# Patient Record
Sex: MALE | Race: WHITE | NOT HISPANIC OR LATINO | Employment: OTHER | URBAN - METROPOLITAN AREA
[De-identification: names, ages, dates, MRNs, and addresses within clinical notes are randomized per-mention and may not be internally consistent; named-entity substitution may affect disease eponyms.]

---

## 2020-09-17 ENCOUNTER — TELEPHONE (OUTPATIENT)
Dept: SCHEDULING | Facility: IMAGING CENTER | Age: 65
End: 2020-09-17

## 2020-10-06 ENCOUNTER — TELEPHONE (OUTPATIENT)
Dept: CARDIOLOGY | Facility: MEDICAL CENTER | Age: 65
End: 2020-10-06

## 2020-10-06 NOTE — TELEPHONE ENCOUNTER
Called and left vm today 10/06/2020 to remind patient about up coming appointment with Dr. Roberts and also want to see information about medical records for Cardio for appointment.

## 2020-10-09 ENCOUNTER — TELEPHONE (OUTPATIENT)
Dept: CARDIOLOGY | Facility: MEDICAL CENTER | Age: 65
End: 2020-10-09

## 2020-10-09 NOTE — TELEPHONE ENCOUNTER
Called again today and left vm 10/09/2020 to remind patient about up coming appointment with Dr. Roberts and also want to see information about medical records for Cardio for appointment.

## 2020-10-14 ENCOUNTER — OFFICE VISIT (OUTPATIENT)
Dept: MEDICAL GROUP | Facility: MEDICAL CENTER | Age: 65
End: 2020-10-14
Payer: COMMERCIAL

## 2020-10-14 VITALS
WEIGHT: 187 LBS | OXYGEN SATURATION: 99 % | HEIGHT: 74 IN | BODY MASS INDEX: 24 KG/M2 | SYSTOLIC BLOOD PRESSURE: 118 MMHG | TEMPERATURE: 98 F | DIASTOLIC BLOOD PRESSURE: 76 MMHG | HEART RATE: 62 BPM

## 2020-10-14 DIAGNOSIS — Q23.1 BICUSPID AORTIC VALVE: ICD-10-CM

## 2020-10-14 DIAGNOSIS — Z23 NEED FOR VACCINATION: ICD-10-CM

## 2020-10-14 DIAGNOSIS — I10 ESSENTIAL HYPERTENSION: ICD-10-CM

## 2020-10-14 DIAGNOSIS — R42 DIZZINESS: ICD-10-CM

## 2020-10-14 DIAGNOSIS — Z91.89 ENCOUNTER FOR HEPATITIS C VIRUS SCREENING TEST FOR HIGH RISK PATIENT: ICD-10-CM

## 2020-10-14 DIAGNOSIS — E78.00 PURE HYPERCHOLESTEROLEMIA: ICD-10-CM

## 2020-10-14 DIAGNOSIS — Z00.00 ANNUAL PHYSICAL EXAM: ICD-10-CM

## 2020-10-14 DIAGNOSIS — Z11.59 ENCOUNTER FOR HEPATITIS C VIRUS SCREENING TEST FOR HIGH RISK PATIENT: ICD-10-CM

## 2020-10-14 PROBLEM — Q23.81 BICUSPID AORTIC VALVE: Status: ACTIVE | Noted: 2020-10-14

## 2020-10-14 PROCEDURE — 99204 OFFICE O/P NEW MOD 45 MIN: CPT | Mod: 25 | Performed by: NURSE PRACTITIONER

## 2020-10-14 PROCEDURE — 90686 IIV4 VACC NO PRSV 0.5 ML IM: CPT | Performed by: NURSE PRACTITIONER

## 2020-10-14 PROCEDURE — 90471 IMMUNIZATION ADMIN: CPT | Performed by: NURSE PRACTITIONER

## 2020-10-14 RX ORDER — LISINOPRIL AND HYDROCHLOROTHIAZIDE 20; 12.5 MG/1; MG/1
TABLET ORAL
COMMUNITY
Start: 2020-08-19 | End: 2021-01-29

## 2020-10-14 RX ORDER — ATORVASTATIN CALCIUM 10 MG/1
10 TABLET, FILM COATED ORAL DAILY
COMMUNITY
End: 2020-10-23

## 2020-10-14 SDOH — HEALTH STABILITY: MENTAL HEALTH: HOW MANY STANDARD DRINKS CONTAINING ALCOHOL DO YOU HAVE ON A TYPICAL DAY?: 1 OR 2

## 2020-10-14 SDOH — HEALTH STABILITY: MENTAL HEALTH: HOW OFTEN DO YOU HAVE A DRINK CONTAINING ALCOHOL?: 4 OR MORE TIMES A WEEK

## 2020-10-14 SDOH — HEALTH STABILITY: MENTAL HEALTH: HOW OFTEN DO YOU HAVE 6 OR MORE DRINKS ON ONE OCCASION?: NEVER

## 2020-10-14 ASSESSMENT — PATIENT HEALTH QUESTIONNAIRE - PHQ9: CLINICAL INTERPRETATION OF PHQ2 SCORE: 0

## 2020-10-14 NOTE — ASSESSMENT & PLAN NOTE
Chronic. Established with Cardiology in [place] and [place]. Last Echo was April 2018, thinking it was diagnosed as moderate aortic insufficiency, no need for intervention.     Denies SOB, SOB with exertion, dizziness, chest pain.     Establishing with Dr. Roberts Cardiology next week.

## 2020-10-14 NOTE — ASSESSMENT & PLAN NOTE
Chronic. New problem to me today. Stable on atorvastatin 10 mg daily. Does get some muscle stiffness in the morning with this. He previously stopped this because of the stiffness and the symptoms went away. He has not been on any other statin in the past.

## 2020-10-14 NOTE — ASSESSMENT & PLAN NOTE
Chronic. New problem to me today. Checking BP at home, ranging 100s-120s/70s. Taking lisinopril-hctz 20-12.5 mg daily.     Denies chest pain, headaches.     Does get some mild dizziness in the morning while lying prone.

## 2020-10-14 NOTE — LETTER
[facility]  ADDY George.  [address]  [place] [address]  Fax: [phone]   Authorization for Release/Disclosure of   Protected Health Information   Name: FIFI WILKINSON : 1955 SSN: [id number]   Address: [address] [place] [address] [place] [address] Phone:    [phone] (home)    I authorize the entity listed below to release/disclose the PHI below to:   [facility]/KHALIF George and KHALIF George   Provider or Entity Name:  Dr. Prabhjot Moreira MD- [facility]   Address   [facility]  [address] [place] [address] Phone: [phone]    Fax: [phone] / [phone]   Reason for request: continuity of care   Information to be released:    [  ] LAST COLONOSCOPY,  including any PATH REPORT and follow-up  [  ] LAST FIT/COLOGUARD RESULT [  ] LAST DEXA  [  ] LAST MAMMOGRAM  [  ] LAST PAP  [  ] LAST LABS [  ] RETINA EXAM REPORT  [  ] IMMUNIZATION RECORDS  [ XXX ] Release all info      [  ] Check here and initial the line next to each item to release ALL health information INCLUDING  _____ Care and treatment for drug and / or alcohol abuse  _____ HIV testing, infection status, or AIDS  _____ Genetic Testing    DATES OF SERVICE OR TIME PERIOD TO BE DISCLOSED: _____________  I understand and acknowledge that:  * This Authorization may be revoked at any time by you in writing, except if your health information has already been used or disclosed.  * Your health information that will be used or disclosed as a result of you signing this authorization could be re-disclosed by the recipient. If this occurs, your re-disclosed health information may no longer be protected by State or Federal laws.  * You may refuse to sign this Authorization. Your refusal will not affect your ability to obtain treatment.  * This Authorization becomes effective upon signing and will  on (date) __________.      If no date is indicated, this Authorization will   one (1) year from the signature date.    Name: Lionel Zeenat    Signature:   Date:     10/14/2020       PLEASE FAX REQUESTED RECORDS BACK TO: [phone]

## 2020-10-14 NOTE — ASSESSMENT & PLAN NOTE
Patient reports intermittent dizziness that has occurred since childhood. He notes that when he is laying supine and tilts his head back he experiences vertigo like symptoms. Symptoms last a few seconds then resolves.

## 2020-10-15 NOTE — PROGRESS NOTES
Subjective:   Lionel Epperson is a 65 y.o. male here today to establish care and discuss the following:    Pure hypercholesterolemia  Chronic. New problem to me today. Stable on atorvastatin 10 mg daily. Does get some muscle stiffness in the morning with this. He previously stopped this because of the stiffness and the symptoms went away. He has not been on any other statin in the past.     Essential hypertension  Chronic. New problem to me today. Checking BP at home, ranging 100s-120s/70s. Taking lisinopril-hctz 20-12.5 mg daily.     Denies chest pain, headaches.     Does get some mild dizziness in the morning while lying prone.     Bicuspid aortic valve  Chronic. Established with Cardiology in [place] and [place]. Last Echo was April 2018, thinking it was diagnosed as moderate aortic insufficiency, no need for intervention.     Denies SOB, SOB with exertion, dizziness, chest pain.     Establishing with Dr. Roberts Cardiology next week.     Healthcare maintenance  Colonoscopy: Last 2019, recall 5-10 years? Will look at report.    Dizziness  Patient reports intermittent dizziness that has occurred since childhood. He notes that when he is laying supine and tilts his head back he experiences vertigo like symptoms. Symptoms last a few seconds then resolves.        Current medicines (including changes today)  Current Outpatient Medications   Medication Sig Dispense Refill   • lisinopril-hydrochlorothiazide (PRINZIDE) 20-12.5 MG per tablet TAKE 1 TAB BY MOUTH ONCE PER DAY. NEEDS APPT FOR FURTHER REFILLS.     • atorvastatin (LIPITOR) 10 MG Tab Take 10 mg by mouth every day.     • ASPIRIN 81 PO Take  by mouth.     • Saw Palmetto-Pumpkin Seed-Zinc 160-40-7.5 MG Cap Take  by mouth.     • Cholecalciferol (VITAMIN D3 PO) Take  by mouth.     • Coenzyme Q10 (COQ-10) 100 MG Cap Take  by mouth.       No current facility-administered medications for this visit.      He  has a past medical history of Bicuspid aortic valve  "(10/14/2020), Essential hypertension (10/14/2020), and Pure hypercholesterolemia (10/14/2020).    ROS   No chest pain, no shortness of breath, no abdominal pain  Positive ROS as per HPI.  All other systems reviewed and are negative.     Objective:     /76 (BP Location: Right arm, Patient Position: Sitting, BP Cuff Size: Adult)   Pulse 62   Temp 36.7 °C (98 °F) (Temporal)   Ht 1.867 m (6' 1.5\")   Wt 84.8 kg (187 lb)   SpO2 99%  Body mass index is 24.34 kg/m².     Physical Exam:  Constitutional: Alert, no distress.  Skin: Warm, dry, good turgor, no rashes in visible areas.  Eye: Equal, round and reactive, conjunctiva clear, lids normal.  ENMT: Lips without lesions, good dentition, oropharynx clear. TMs WNL  Neck: Trachea midline, no masses, no thyromegaly. No cervical or supraclavicular lymphadenopathy   Respiratory: Unlabored respiratory effort, lungs clear to auscultation, no wheezes, no ronchi.  Cardiovascular: Normal S1, S2, + 3/5 murmur, no edema  Psych: Alert and oriented x3, normal affect and mood.      Assessment and Plan:   The following treatment plan was discussed    1. Bicuspid aortic valve  Stable  Asymptomatic  Follow up with cardiology for continued monitoring    2. Essential hypertension  Stable  Continue lisinopril-hctz 20-12.5 mg daily  Check labs  - CBC WITH DIFFERENTIAL; Future  - Comp Metabolic Panel; Future  - MICROALBUMIN CREAT RATIO URINE; Future  - TSH WITH REFLEX TO FT4; Future    3. Pure hypercholesterolemia  Stable  Continue atorvastatin 10 mg daily  Discussed switching statin to other agent due to morning muscle stiffness, he will discuss with cardiology next week  Due for labs  - Lipid Profile; Future    4. Dizziness  Unstable  Possible inner ear issue, however it has been ongoing for quite some time. Unsure if there are treatment options.   Advised avoiding aggravating positions  Follow up with ENT if he would like to discuss options further  Also advised that he discuss with " cardiology as possibly symptom of his aortic insufficiency  - REFERRAL TO ENT    5. Annual physical exam  - CBC WITH DIFFERENTIAL; Future  - Comp Metabolic Panel; Future  - MICROALBUMIN CREAT RATIO URINE; Future  - Lipid Profile; Future  - TSH WITH REFLEX TO FT4; Future  - VITAMIN D,25 HYDROXY; Future  - PSA TOTAL + %FREE; Future    6. Encounter for hepatitis C screening test for high risk patient  - HEP C VIRUS ANTIBODY; Future    7. Need for vaccination  - Influenza Vaccine Quad Injection (PF)      Followup: Return in about 1 year (around 10/14/2021).    I have placed the below orders and discussed them with an approved delegating provider. The MA is performing the below orders under the direction of Dr. Packer

## 2020-10-23 ENCOUNTER — OFFICE VISIT (OUTPATIENT)
Dept: CARDIOLOGY | Facility: MEDICAL CENTER | Age: 65
End: 2020-10-23
Payer: COMMERCIAL

## 2020-10-23 VITALS
OXYGEN SATURATION: 98 % | WEIGHT: 186.6 LBS | DIASTOLIC BLOOD PRESSURE: 76 MMHG | HEIGHT: 74 IN | SYSTOLIC BLOOD PRESSURE: 124 MMHG | BODY MASS INDEX: 23.95 KG/M2 | HEART RATE: 62 BPM

## 2020-10-23 DIAGNOSIS — I10 ESSENTIAL HYPERTENSION: ICD-10-CM

## 2020-10-23 DIAGNOSIS — Q23.1 BICUSPID AORTIC VALVE: ICD-10-CM

## 2020-10-23 DIAGNOSIS — I35.0 SEVERE AORTIC STENOSIS: Chronic | ICD-10-CM

## 2020-10-23 DIAGNOSIS — E78.00 PURE HYPERCHOLESTEROLEMIA: ICD-10-CM

## 2020-10-23 LAB — EKG IMPRESSION: NORMAL

## 2020-10-23 PROCEDURE — 99204 OFFICE O/P NEW MOD 45 MIN: CPT | Performed by: INTERNAL MEDICINE

## 2020-10-23 PROCEDURE — 93000 ELECTROCARDIOGRAM COMPLETE: CPT | Performed by: INTERNAL MEDICINE

## 2020-10-23 RX ORDER — ROSUVASTATIN CALCIUM 10 MG/1
10 TABLET, COATED ORAL EVERY EVENING
Qty: 90 TAB | Refills: 3 | Status: SHIPPED | OUTPATIENT
Start: 2020-10-23 | End: 2021-09-20

## 2020-10-23 ASSESSMENT — ENCOUNTER SYMPTOMS
ABDOMINAL PAIN: 0
WEAKNESS: 0
BLURRED VISION: 0
NAUSEA: 0
SHORTNESS OF BREATH: 0
PND: 0
DIZZINESS: 0
SORE THROAT: 0
PALPITATIONS: 0
FEVER: 0
FALLS: 0
CHILLS: 0
FOCAL WEAKNESS: 0
CLAUDICATION: 0
COUGH: 0
BRUISES/BLEEDS EASILY: 0

## 2020-10-23 NOTE — PROGRESS NOTES
Chief Complaint   Patient presents with   • New Patient     Bicuspid Aortic Valve       Subjective:   Lionel Epperson is a 65 y.o. male who presents today to establish care with a history of bicuspid aortic valve with severe stenosis based on echocardiogram in 2019 he followed with [facility] cardiology in [facility] for a long time he worked an  no way and moved here for [facility] he is very active with outdoor activities from hikes and has made to healthy weight and activity level apparently no family history of bicuspid valve although both parents did have coronary disease later in life he has been on long-term statin therapy but was having some stiffness in the mornings he was attributing to atorvastatin he has been on co-Q10    Past Medical History:   Diagnosis Date   • Bicuspid aortic valve 10/14/2020   • Essential hypertension 10/14/2020   • Pure hypercholesterolemia 10/14/2020   • Severe aortic stenosis      History reviewed. No pertinent surgical history.  He has no surgeries  Family History   Problem Relation Age of Onset   • Heart Disease Mother    • Heart Attack Mother 88   • Heart Disease Father 65        CABG     Social History     Socioeconomic History   • Marital status:      Spouse name: Not on file   • Number of children: Not on file   • Years of education: Not on file   • Highest education level: Not on file   Occupational History   • Not on file   Social Needs   • Financial resource strain: Not on file   • Food insecurity     Worry: Not on file     Inability: Not on file   • Transportation needs     Medical: Not on file     Non-medical: Not on file   Tobacco Use   • Smoking status: Never Smoker   • Smokeless tobacco: Never Used   Substance and Sexual Activity   • Alcohol use: Yes     Frequency: 4 or more times a week     Drinks per session: 1 or 2     Binge frequency: Never   • Drug use: Not Currently   • Sexual activity: Yes   Lifestyle   • Physical activity     Days per  week: Not on file     Minutes per session: Not on file   • Stress: Not on file   Relationships   • Social connections     Talks on phone: Not on file     Gets together: Not on file     Attends Gnosticism service: Not on file     Active member of club or organization: Not on file     Attends meetings of clubs or organizations: Not on file     Relationship status: Not on file   • Intimate partner violence     Fear of current or ex partner: Not on file     Emotionally abused: Not on file     Physically abused: Not on file     Forced sexual activity: Not on file   Other Topics Concern   • Not on file   Social History Narrative   • Not on file     No Known Allergies  Outpatient Encounter Medications as of 10/23/2020   Medication Sig Dispense Refill   • Coenzyme Q10 300 MG Cap Take 1 Cap by mouth every day. 30 Cap    • rosuvastatin (CRESTOR) 10 MG Tab Take 1 Tab by mouth every evening. 90 Tab 3   • lisinopril-hydrochlorothiazide (PRINZIDE) 20-12.5 MG per tablet TAKE 1 TAB BY MOUTH ONCE PER DAY. NEEDS APPT FOR FURTHER REFILLS.     • ASPIRIN 81 PO Take  by mouth.     • Saw Palmetto-Pumpkin Seed-Zinc 160-40-7.5 MG Cap Take  by mouth.     • Cholecalciferol (VITAMIN D3 PO) Take  by mouth.     • [DISCONTINUED] atorvastatin (LIPITOR) 10 MG Tab Take 10 mg by mouth every day.     • [DISCONTINUED] Coenzyme Q10 (COQ-10) 100 MG Cap Take  by mouth.       No facility-administered encounter medications on file as of 10/23/2020.      Review of Systems   Constitutional: Negative for chills and fever.   HENT: Negative for sore throat.    Eyes: Negative for blurred vision.   Respiratory: Negative for cough and shortness of breath.    Cardiovascular: Negative for chest pain, palpitations, claudication, leg swelling and PND.   Gastrointestinal: Negative for abdominal pain and nausea.   Musculoskeletal: Negative for falls and joint pain.   Skin: Negative for rash.   Neurological: Negative for dizziness, focal weakness and weakness.  "  Endo/Heme/Allergies: Does not bruise/bleed easily.        Objective:   /76 (BP Location: Left arm, Patient Position: Sitting, BP Cuff Size: Adult)   Pulse 62   Ht 1.867 m (6' 1.5\")   Wt 84.6 kg (186 lb 9.6 oz)   SpO2 98%   BMI 24.29 kg/m²     Physical Exam   Constitutional: No distress.   HENT:   Patient wearing a mask due to COVID precautions   Eyes: No scleral icterus.   Neck: No JVD present.   Cardiovascular: Normal rate. Exam reveals no gallop and no friction rub.   Murmur heard.  Pulmonary/Chest: No respiratory distress. He has no wheezes. He has no rales.   Abdominal: Soft. Bowel sounds are normal.   Musculoskeletal:         General: No edema.   Neurological: He is alert.   Skin: No rash noted. He is not diaphoretic.   Psychiatric: He has a normal mood and affect.     We reviewed in person the most recent labs  Recent Results (from the past 4032 hour(s))   EKG    Collection Time: 10/23/20  8:20 AM   Result Value Ref Range    Report       [facility]    Test Date:  2020-10-23  Pt Name:    FIFI WILKINSON              Department: [facility]  MRN:        [id number]                      Room:  Gender:     Male                         Technician:   :        1955                   Requested By:KRIS WHITTEN  Order #:    [id number]                    Reading MD:    Measurements  Intervals                                Axis  Rate:       62                           P:          76  VT:         180                          QRS:        53  QRSD:       110                          T:          12  QT:         444  QTc:        451    Interpretive Statements  SINUS RHYTHM  INCOMPLETE RIGHT BUNDLE BRANCH BLOCK  CONSIDER ANTEROSEPTAL INFARCT  No previous ECG available for comparison       Echo reports reviwed  Assessment:     1. Bicuspid aortic valve  EKG   2. Pure hypercholesterolemia  LIPOPROTEIN A    CRP HIGH SENSITIVE (CARDIAC)    Lipid Profile    Comp Metabolic Panel   3. Essential " hypertension     4. Severe aortic stenosis         Medical Decision Making:  Today's Assessment / Status / Plan:     It was my pleasure to meet with Mr. Epperson.    Blood pressure is well controlled.  We specifically assessed the labs on hypertension treatment    He is on appropriate statin.  Switch to rosuvastin    chech echo and f/u in two years we discussed the expectant management of bicuspid aortic valve currently there is no approved percutaneous approach hopefully that changes over time so he will continue monitor for symptoms I think we will be able out wait to see the technology advanced to where he can have it done percutaneous      I will see Mr. Epperson back in 2 years time and encouraged him to follow up with us over the phone or electronically using my Linkihart as issues arise.    It is my pleasure to participate in the care of Mr. Epperson.  Please do not hesitate to contact me with questions or concerns.    Shaggy Roberts MD PhD [facility]  Cardiologist [facility]    Please note that this dictation was created using voice recognition software. There may be errors I did not discover before finalizing the note.

## 2020-10-26 ENCOUNTER — HOSPITAL ENCOUNTER (OUTPATIENT)
Dept: RADIOLOGY | Facility: MEDICAL CENTER | Age: 65
End: 2020-10-26
Payer: COMMERCIAL

## 2021-01-27 DIAGNOSIS — I10 ESSENTIAL HYPERTENSION: ICD-10-CM

## 2021-01-29 RX ORDER — LISINOPRIL AND HYDROCHLOROTHIAZIDE 20; 12.5 MG/1; MG/1
1 TABLET ORAL DAILY
Qty: 90 TAB | Refills: 6 | Status: SHIPPED | OUTPATIENT
Start: 2021-01-29 | End: 2022-04-06 | Stop reason: SDUPTHER

## 2021-03-15 DIAGNOSIS — Z23 NEED FOR VACCINATION: ICD-10-CM

## 2021-03-19 ENCOUNTER — IMMUNIZATION (OUTPATIENT)
Dept: FAMILY PLANNING/WOMEN'S HEALTH CLINIC | Facility: IMMUNIZATION CENTER | Age: 66
End: 2021-03-19
Attending: INTERNAL MEDICINE
Payer: COMMERCIAL

## 2021-03-19 DIAGNOSIS — Z23 ENCOUNTER FOR VACCINATION: Primary | ICD-10-CM

## 2021-03-19 DIAGNOSIS — Z23 NEED FOR VACCINATION: ICD-10-CM

## 2021-03-19 PROCEDURE — 91301 MODERNA SARS-COV-2 VACCINE: CPT | Performed by: INTERNAL MEDICINE

## 2021-03-19 PROCEDURE — 0011A MODERNA SARS-COV-2 VACCINE: CPT | Performed by: INTERNAL MEDICINE

## 2021-04-12 ENCOUNTER — PATIENT OUTREACH (OUTPATIENT)
Dept: MEDICAL GROUP | Facility: MEDICAL CENTER | Age: 66
End: 2021-04-12

## 2021-04-12 NOTE — PROGRESS NOTES
ANNUAL WELLNESS VISIT PRE-VISIT PLANNING    This is a free wellness visit. The provider will screen for medical conditions to help you stay healthy. If you have other concerns to address you may be asked to discuss these at a separate visit or there may be an additional fee.    1.  Reviewed notes from the last office visit: Yes    2.  If any orders were ordered or intended to be done prior to visit (i.e. 6 mos follow-up), do we have results/consult notes or has patient scheduled?   · Labs - Labs ordered, NOT completed. Patient advised to complete prior to next appointment.  · Imaging - Imaging was not ordered at last office visit.  · Referrals - Referral ordered, patient has NOT been seen. Referral to ENT has been AUTHORIZED.    3.  Immunizations were updated in Epic using Reconcile Outside Information activity? Yes  · Is patient due for Tdap? No  · Is patient due for Shingrix? Yes. Extra co-pay amount applies to this immunization.    4.  Patient is due for the following Health Maintenance Topics:   Health Maintenance Due   Topic Date Due   • HEPATITIS C SCREENING  Never done   • COLONOSCOPY  Never done   • IMM ZOSTER VACCINES (2 of 3) 12/27/2012   • COVID-19 Vaccine (2 - Moderna 2-dose series) 04/16/2021     5.  Reviewed/Updated the following with patient:  · Preferred Pharmacy? Yes  · Preferred Lab? Yes  · Communication? Yes  · Allergies? Yes  · Medications? YES. Was Abstract Encounter opened and chart updated? YES. Outside medication reconciliation not completed. Medication already on chart.  · Social History? Yes  · Family History? No    6.  Care Team Updated:       •   DME Company (gait device, O2, CPAP, etc.): N\A       •   Other Specialists (eye doctor, derm, GYN, cardiology, endo, etc): N\A      Patient NOT contacted to complete Annual Wellness Visit Pre-Visit Planning. Information was reviewed in chart.    ----------------------------------------------------------------------------------------------------  This pre-Visit Planning note has been created for the Provider and Medical Assistant to review prior to the patient's office appointment. Patient is NOT REQUIRED to follow-up on this note.   Outside information NOT reconciled using the "[employer]" feature. Per Whitney May, the "[employer] feature is down as of 02/09/2021 at 2:00pm. Will reconcile outside information at a later date.

## 2021-04-13 ENCOUNTER — HOSPITAL ENCOUNTER (OUTPATIENT)
Dept: LAB | Facility: MEDICAL CENTER | Age: 66
End: 2021-04-13
Attending: NURSE PRACTITIONER
Payer: COMMERCIAL

## 2021-04-13 ENCOUNTER — PATIENT MESSAGE (OUTPATIENT)
Dept: CARDIOLOGY | Facility: MEDICAL CENTER | Age: 66
End: 2021-04-13

## 2021-04-13 ENCOUNTER — HOSPITAL ENCOUNTER (OUTPATIENT)
Dept: LAB | Facility: MEDICAL CENTER | Age: 66
End: 2021-04-13
Attending: INTERNAL MEDICINE
Payer: COMMERCIAL

## 2021-04-13 DIAGNOSIS — Z11.59 ENCOUNTER FOR HEPATITIS C VIRUS SCREENING TEST FOR HIGH RISK PATIENT: ICD-10-CM

## 2021-04-13 DIAGNOSIS — E78.00 PURE HYPERCHOLESTEROLEMIA: ICD-10-CM

## 2021-04-13 DIAGNOSIS — Z91.89 ENCOUNTER FOR HEPATITIS C VIRUS SCREENING TEST FOR HIGH RISK PATIENT: ICD-10-CM

## 2021-04-13 DIAGNOSIS — Z00.00 ANNUAL PHYSICAL EXAM: ICD-10-CM

## 2021-04-13 DIAGNOSIS — I10 ESSENTIAL HYPERTENSION: ICD-10-CM

## 2021-04-13 LAB
ALBUMIN SERPL BCP-MCNC: 4.4 G/DL (ref 3.2–4.9)
ALBUMIN SERPL BCP-MCNC: 4.4 G/DL (ref 3.2–4.9)
ALBUMIN/GLOB SERPL: 1.7 G/DL
ALBUMIN/GLOB SERPL: 1.8 G/DL
ALP SERPL-CCNC: 52 U/L (ref 30–99)
ALP SERPL-CCNC: 53 U/L (ref 30–99)
ALT SERPL-CCNC: 23 U/L (ref 2–50)
ALT SERPL-CCNC: 24 U/L (ref 2–50)
ANION GAP SERPL CALC-SCNC: 10 MMOL/L (ref 7–16)
ANION GAP SERPL CALC-SCNC: 10 MMOL/L (ref 7–16)
AST SERPL-CCNC: 19 U/L (ref 12–45)
AST SERPL-CCNC: 20 U/L (ref 12–45)
BASOPHILS # BLD AUTO: 0.8 % (ref 0–1.8)
BASOPHILS # BLD: 0.06 K/UL (ref 0–0.12)
BILIRUB SERPL-MCNC: 0.6 MG/DL (ref 0.1–1.5)
BILIRUB SERPL-MCNC: 0.6 MG/DL (ref 0.1–1.5)
BUN SERPL-MCNC: 19 MG/DL (ref 8–22)
BUN SERPL-MCNC: 19 MG/DL (ref 8–22)
CALCIUM SERPL-MCNC: 9.5 MG/DL (ref 8.4–10.2)
CALCIUM SERPL-MCNC: 9.5 MG/DL (ref 8.4–10.2)
CHLORIDE SERPL-SCNC: 108 MMOL/L (ref 96–112)
CHLORIDE SERPL-SCNC: 108 MMOL/L (ref 96–112)
CHOLEST SERPL-MCNC: 122 MG/DL (ref 100–199)
CHOLEST SERPL-MCNC: 122 MG/DL (ref 100–199)
CO2 SERPL-SCNC: 22 MMOL/L (ref 20–33)
CO2 SERPL-SCNC: 22 MMOL/L (ref 20–33)
CREAT SERPL-MCNC: 0.84 MG/DL (ref 0.5–1.4)
CREAT SERPL-MCNC: 0.85 MG/DL (ref 0.5–1.4)
CREAT UR-MCNC: 229.3 MG/DL
CRP SERPL HS-MCNC: <0.2 MG/L (ref 0–7.5)
EOSINOPHIL # BLD AUTO: 0.09 K/UL (ref 0–0.51)
EOSINOPHIL NFR BLD: 1.3 % (ref 0–6.9)
ERYTHROCYTE [DISTWIDTH] IN BLOOD BY AUTOMATED COUNT: 39.8 FL (ref 35.9–50)
FASTING STATUS PATIENT QL REPORTED: NORMAL
FASTING STATUS PATIENT QL REPORTED: NORMAL
GLOBULIN SER CALC-MCNC: 2.5 G/DL (ref 1.9–3.5)
GLOBULIN SER CALC-MCNC: 2.6 G/DL (ref 1.9–3.5)
GLUCOSE SERPL-MCNC: 100 MG/DL (ref 65–99)
GLUCOSE SERPL-MCNC: 101 MG/DL (ref 65–99)
HCT VFR BLD AUTO: 49.4 % (ref 42–52)
HCV AB SER QL: NORMAL
HDLC SERPL-MCNC: 44 MG/DL
HDLC SERPL-MCNC: 45 MG/DL
HGB BLD-MCNC: 16.6 G/DL (ref 14–18)
IMM GRANULOCYTES # BLD AUTO: 0.02 K/UL (ref 0–0.11)
IMM GRANULOCYTES NFR BLD AUTO: 0.3 % (ref 0–0.9)
LDLC SERPL CALC-MCNC: 57 MG/DL
LDLC SERPL CALC-MCNC: 58 MG/DL
LYMPHOCYTES # BLD AUTO: 2.04 K/UL (ref 1–4.8)
LYMPHOCYTES NFR BLD: 28.7 % (ref 22–41)
MCH RBC QN AUTO: 30.2 PG (ref 27–33)
MCHC RBC AUTO-ENTMCNC: 33.6 G/DL (ref 33.7–35.3)
MCV RBC AUTO: 90 FL (ref 81.4–97.8)
MICROALBUMIN UR-MCNC: <1.2 MG/DL
MICROALBUMIN/CREAT UR: NORMAL MG/G (ref 0–30)
MONOCYTES # BLD AUTO: 0.6 K/UL (ref 0–0.85)
MONOCYTES NFR BLD AUTO: 8.4 % (ref 0–13.4)
NEUTROPHILS # BLD AUTO: 4.3 K/UL (ref 1.82–7.42)
NEUTROPHILS NFR BLD: 60.5 % (ref 44–72)
NRBC # BLD AUTO: 0 K/UL
NRBC BLD-RTO: 0 /100 WBC
PLATELET # BLD AUTO: 273 K/UL (ref 164–446)
PMV BLD AUTO: 11.4 FL (ref 9–12.9)
POTASSIUM SERPL-SCNC: 4.5 MMOL/L (ref 3.6–5.5)
POTASSIUM SERPL-SCNC: 4.5 MMOL/L (ref 3.6–5.5)
PROT SERPL-MCNC: 6.9 G/DL (ref 6–8.2)
PROT SERPL-MCNC: 7 G/DL (ref 6–8.2)
RBC # BLD AUTO: 5.49 M/UL (ref 4.7–6.1)
SODIUM SERPL-SCNC: 140 MMOL/L (ref 135–145)
SODIUM SERPL-SCNC: 140 MMOL/L (ref 135–145)
TRIGL SERPL-MCNC: 100 MG/DL (ref 0–149)
TRIGL SERPL-MCNC: 99 MG/DL (ref 0–149)
TSH SERPL DL<=0.005 MIU/L-ACNC: 1.82 UIU/ML (ref 0.38–5.33)
WBC # BLD AUTO: 7.1 K/UL (ref 4.8–10.8)

## 2021-04-13 PROCEDURE — 80053 COMPREHEN METABOLIC PANEL: CPT

## 2021-04-13 PROCEDURE — 80061 LIPID PANEL: CPT

## 2021-04-13 PROCEDURE — 80053 COMPREHEN METABOLIC PANEL: CPT | Mod: 91

## 2021-04-13 PROCEDURE — 86141 C-REACTIVE PROTEIN HS: CPT

## 2021-04-13 PROCEDURE — 85025 COMPLETE CBC W/AUTO DIFF WBC: CPT

## 2021-04-13 PROCEDURE — 84443 ASSAY THYROID STIM HORMONE: CPT

## 2021-04-13 PROCEDURE — 84154 ASSAY OF PSA FREE: CPT

## 2021-04-13 PROCEDURE — 36415 COLL VENOUS BLD VENIPUNCTURE: CPT

## 2021-04-13 PROCEDURE — 80061 LIPID PANEL: CPT | Mod: 91

## 2021-04-13 PROCEDURE — 86803 HEPATITIS C AB TEST: CPT

## 2021-04-13 PROCEDURE — 82306 VITAMIN D 25 HYDROXY: CPT

## 2021-04-13 PROCEDURE — 84153 ASSAY OF PSA TOTAL: CPT

## 2021-04-13 PROCEDURE — 82043 UR ALBUMIN QUANTITATIVE: CPT

## 2021-04-13 PROCEDURE — 83695 ASSAY OF LIPOPROTEIN(A): CPT

## 2021-04-13 PROCEDURE — 82570 ASSAY OF URINE CREATININE: CPT

## 2021-04-14 LAB — 25(OH)D3 SERPL-MCNC: 41 NG/ML (ref 30–80)

## 2021-04-15 LAB
LPA SERPL-MCNC: <6 MG/DL
PSA FREE MFR SERPL: 38 %
PSA FREE SERPL-MCNC: 0.3 NG/ML
PSA SERPL-MCNC: 0.8 NG/ML (ref 0–4)

## 2021-04-17 ENCOUNTER — IMMUNIZATION (OUTPATIENT)
Dept: FAMILY PLANNING/WOMEN'S HEALTH CLINIC | Facility: IMMUNIZATION CENTER | Age: 66
End: 2021-04-17
Attending: INTERNAL MEDICINE
Payer: COMMERCIAL

## 2021-04-17 DIAGNOSIS — Z23 ENCOUNTER FOR VACCINATION: Primary | ICD-10-CM

## 2021-04-17 PROCEDURE — 91301 MODERNA SARS-COV-2 VACCINE: CPT

## 2021-04-17 PROCEDURE — 0012A MODERNA SARS-COV-2 VACCINE: CPT

## 2021-04-19 ENCOUNTER — OFFICE VISIT (OUTPATIENT)
Dept: MEDICAL GROUP | Facility: MEDICAL CENTER | Age: 66
End: 2021-04-19
Payer: COMMERCIAL

## 2021-04-19 VITALS
DIASTOLIC BLOOD PRESSURE: 70 MMHG | SYSTOLIC BLOOD PRESSURE: 118 MMHG | BODY MASS INDEX: 25.12 KG/M2 | OXYGEN SATURATION: 97 % | TEMPERATURE: 98.1 F | HEART RATE: 61 BPM | WEIGHT: 193 LBS

## 2021-04-19 DIAGNOSIS — Q23.1 BICUSPID AORTIC VALVE: ICD-10-CM

## 2021-04-19 DIAGNOSIS — E78.00 PURE HYPERCHOLESTEROLEMIA: ICD-10-CM

## 2021-04-19 DIAGNOSIS — I10 ESSENTIAL HYPERTENSION: ICD-10-CM

## 2021-04-19 DIAGNOSIS — I35.0 SEVERE AORTIC STENOSIS: Chronic | ICD-10-CM

## 2021-04-19 PROCEDURE — 99396 PREV VISIT EST AGE 40-64: CPT | Performed by: NURSE PRACTITIONER

## 2021-04-19 SDOH — ECONOMIC STABILITY: TRANSPORTATION INSECURITY
IN THE PAST 12 MONTHS, HAS THE LACK OF TRANSPORTATION KEPT YOU FROM MEDICAL APPOINTMENTS OR FROM GETTING MEDICATIONS?: NO

## 2021-04-19 SDOH — HEALTH STABILITY: MENTAL HEALTH
STRESS IS WHEN SOMEONE FEELS TENSE, NERVOUS, ANXIOUS, OR CAN'T SLEEP AT NIGHT BECAUSE THEIR MIND IS TROUBLED. HOW STRESSED ARE YOU?: NOT AT ALL

## 2021-04-19 SDOH — ECONOMIC STABILITY: HOUSING INSECURITY
IN THE LAST 12 MONTHS, WAS THERE A TIME WHEN YOU DID NOT HAVE A STEADY PLACE TO SLEEP OR SLEPT IN A SHELTER (INCLUDING NOW)?: NO

## 2021-04-19 SDOH — HEALTH STABILITY: PHYSICAL HEALTH: ON AVERAGE, HOW MANY MINUTES DO YOU ENGAGE IN EXERCISE AT THIS LEVEL?: 40 MINUTES

## 2021-04-19 SDOH — ECONOMIC STABILITY: TRANSPORTATION INSECURITY
IN THE PAST 12 MONTHS, HAS LACK OF RELIABLE TRANSPORTATION KEPT YOU FROM MEDICAL APPOINTMENTS, MEETINGS, WORK OR FROM GETTING THINGS NEEDED FOR DAILY LIVING?: NO

## 2021-04-19 SDOH — ECONOMIC STABILITY: HOUSING INSECURITY: IN THE LAST 12 MONTHS, HOW MANY PLACES HAVE YOU LIVED?: 1

## 2021-04-19 SDOH — ECONOMIC STABILITY: INCOME INSECURITY: IN THE LAST 12 MONTHS, WAS THERE A TIME WHEN YOU WERE NOT ABLE TO PAY THE MORTGAGE OR RENT ON TIME?: NO

## 2021-04-19 SDOH — HEALTH STABILITY: PHYSICAL HEALTH: ON AVERAGE, HOW MANY DAYS PER WEEK DO YOU ENGAGE IN MODERATE TO STRENUOUS EXERCISE (LIKE A BRISK WALK)?: 6 DAYS

## 2021-04-19 ASSESSMENT — SOCIAL DETERMINANTS OF HEALTH (SDOH)
WITHIN THE PAST 12 MONTHS, YOU WORRIED THAT YOUR FOOD WOULD RUN OUT BEFORE YOU GOT THE MONEY TO BUY MORE: NEVER TRUE
HOW HARD IS IT FOR YOU TO PAY FOR THE VERY BASICS LIKE FOOD, HOUSING, MEDICAL CARE, AND HEATING?: NOT HARD AT ALL
HOW OFTEN DO YOU ATTEND CHURCH OR RELIGIOUS SERVICES?: DECLINE
HOW OFTEN DO YOU GET TOGETHER WITH FRIENDS OR RELATIVES?: DECLINE
HOW OFTEN DO YOU HAVE SIX OR MORE DRINKS ON ONE OCCASION: NEVER
HOW MANY DRINKS CONTAINING ALCOHOL DO YOU HAVE ON A TYPICAL DAY WHEN YOU ARE DRINKING: 1 OR 2
HOW OFTEN DO YOU HAVE A DRINK CONTAINING ALCOHOL: 4 OR MORE TIMES A WEEK
IN A TYPICAL WEEK, HOW MANY TIMES DO YOU TALK ON THE PHONE WITH FAMILY, FRIENDS, OR NEIGHBORS?: MORE THAN THREE TIMES A WEEK
WITHIN THE PAST 12 MONTHS, THE FOOD YOU BOUGHT JUST DIDN'T LAST AND YOU DIDN'T HAVE MONEY TO GET MORE: NEVER TRUE
DO YOU BELONG TO ANY CLUBS OR ORGANIZATIONS SUCH AS CHURCH GROUPS UNIONS, FRATERNAL OR ATHLETIC GROUPS, OR SCHOOL GROUPS?: YES
HOW OFTEN DO YOU ATTENT MEETINGS OF THE CLUB OR ORGANIZATION YOU BELONG TO?: MORE THAN 4 TIMES PER YEAR

## 2021-04-19 ASSESSMENT — PATIENT HEALTH QUESTIONNAIRE - PHQ9: CLINICAL INTERPRETATION OF PHQ2 SCORE: 0

## 2021-04-19 ASSESSMENT — FIBROSIS 4 INDEX: FIB4 SCORE: 0.93

## 2021-04-19 NOTE — ASSESSMENT & PLAN NOTE
Chronic. Stable on lisinopril-hctz 20-12.5 mg daily.     Denies chest pain, headaches.     Does get some mild dizziness in the morning while lying prone.

## 2021-04-19 NOTE — ASSESSMENT & PLAN NOTE
Chronic. Established with Cardiology, Dr. Roberts. Last Echo was April 2018 showed severe aortic stenosis.    Denies SOB, SOB with exertion, dizziness, chest pain.

## 2021-04-19 NOTE — ASSESSMENT & PLAN NOTE
Chronic. Stable, asymptomatic. Established with cardiology. No intervention planned at this time.

## 2021-04-19 NOTE — PROGRESS NOTES
Subjective:   Lionel Epperson is a 66 y.o. male here today for annual physical    Bicuspid aortic valve  Chronic. Established with Cardiology, Dr. Roberts. Last Echo was April 2018 showed severe aortic stenosis.    Denies SOB, SOB with exertion, dizziness, chest pain.     Essential hypertension  Chronic. Stable on lisinopril-hctz 20-12.5 mg daily.     Denies chest pain, headaches.     Does get some mild dizziness in the morning while lying prone.     Pure hypercholesterolemia  Chronic. Stable on rosuvastatin 10 mg daily.     Severe aortic stenosis  Chronic. Stable, asymptomatic. Established with cardiology. No intervention planned at this time.        Current medicines (including changes today)  Current Outpatient Medications   Medication Sig Dispense Refill   • lisinopril-hydrochlorothiazide (PRINZIDE) 20-12.5 MG per tablet Take 1 Tab by mouth every day. 90 Tab 6   • rosuvastatin (CRESTOR) 10 MG Tab Take 1 Tab by mouth every evening. 90 Tab 3   • ASPIRIN 81 PO Take  by mouth.     • Saw Palmetto-Pumpkin Seed-Zinc 160-40-7.5 MG Cap Take  by mouth.     • Cholecalciferol (VITAMIN D3 PO) Take  by mouth.       No current facility-administered medications for this visit.     He  has a past medical history of Bicuspid aortic valve (10/14/2020), Essential hypertension (10/14/2020), Pure hypercholesterolemia (10/14/2020), and Severe aortic stenosis.    ROS   No chest pain, no shortness of breath, no abdominal pain  Positive ROS as per HPI.  All other systems reviewed and are negative.     Objective:     /70 (BP Location: Right arm, Patient Position: Sitting, BP Cuff Size: Adult)   Pulse 61   Temp 36.7 °C (98.1 °F) (Temporal)   Wt 87.5 kg (193 lb)   SpO2 97%  Body mass index is 25.12 kg/m².     Physical Exam:  Constitutional: Alert, no distress.  Skin: Warm, dry, good turgor, no rashes in visible areas.  Eye: Equal, round and reactive, conjunctiva clear, lids normal.  ENMT: Lips without lesions, good dentition,  oropharynx clear.  Neck: Trachea midline, no masses, no thyromegaly. No cervical or supraclavicular lymphadenopathy  Respiratory: Unlabored respiratory effort, lungs clear to auscultation, no wheezes, no ronchi.  Cardiovascular: Normal S1, S2, + murmur, no edema.  Abdomen: Soft, non-tender, no masses, no hepatosplenomegaly.  Psych: Alert and oriented x3, normal affect and mood.        Assessment and Plan:   The following treatment plan was discussed    1. Bicuspid aortic valve  Stable  Continue follow up with cardiology  Report new symptoms immediately    2. Essential hypertension  Stable  Continue lisinopril-hctz daily    3. Pure hypercholesterolemia  Stable  Continue rosuvastatin 10 mg daily    4. Severe aortic stenosis  Stable, asymptomatic  Report any new symptoms  Continue follow up with cardiology      Followup: Return in about 1 year (around 4/19/2022).    I have placed the below orders and discussed them with an approved delegating provider. The MA is performing the below orders under the direction of Dr. Perkins

## 2021-04-23 ENCOUNTER — PATIENT MESSAGE (OUTPATIENT)
Dept: CARDIOLOGY | Facility: MEDICAL CENTER | Age: 66
End: 2021-04-23

## 2021-04-23 NOTE — TELEPHONE ENCOUNTER
----- Message from Shaggy Roberts M.D. sent at 4/13/2021 10:46 AM PDT -----  Labs look good, please let him know     Thank you

## 2021-09-19 DIAGNOSIS — E78.00 PURE HYPERCHOLESTEROLEMIA: ICD-10-CM

## 2021-09-20 RX ORDER — ROSUVASTATIN CALCIUM 10 MG/1
10 TABLET, COATED ORAL
Qty: 90 TABLET | Refills: 0 | Status: SHIPPED | OUTPATIENT
Start: 2021-09-20 | End: 2021-12-13

## 2021-12-12 DIAGNOSIS — E78.00 PURE HYPERCHOLESTEROLEMIA: ICD-10-CM

## 2021-12-14 RX ORDER — ROSUVASTATIN CALCIUM 10 MG/1
10 TABLET, COATED ORAL EVERY EVENING
Qty: 90 TABLET | Refills: 1 | Status: SHIPPED | OUTPATIENT
Start: 2021-12-14 | End: 2022-04-06 | Stop reason: SDUPTHER

## 2021-12-14 NOTE — TELEPHONE ENCOUNTER
Medication Refill  Jennifer Crane, Med Ass't  You Yesterday (10:48 AM)       Pt has upcoming follow up appt with CW on 4/6/22, please advise on how many refills to give.   Thank you.    Routing comment        UPON CHART REVIEW, PT LAST SEEN 10/2020.

## 2022-03-29 ENCOUNTER — PATIENT MESSAGE (OUTPATIENT)
Dept: MEDICAL GROUP | Facility: MEDICAL CENTER | Age: 67
End: 2022-03-29
Payer: COMMERCIAL

## 2022-03-29 DIAGNOSIS — Z00.00 ANNUAL PHYSICAL EXAM: ICD-10-CM

## 2022-03-29 DIAGNOSIS — E78.00 PURE HYPERCHOLESTEROLEMIA: ICD-10-CM

## 2022-03-29 DIAGNOSIS — I10 ESSENTIAL HYPERTENSION: ICD-10-CM

## 2022-03-29 DIAGNOSIS — R73.01 ELEVATED FASTING GLUCOSE: ICD-10-CM

## 2022-04-04 ENCOUNTER — HOSPITAL ENCOUNTER (OUTPATIENT)
Dept: LAB | Facility: MEDICAL CENTER | Age: 67
End: 2022-04-04
Attending: NURSE PRACTITIONER
Payer: MEDICARE

## 2022-04-04 DIAGNOSIS — I10 ESSENTIAL HYPERTENSION: ICD-10-CM

## 2022-04-04 DIAGNOSIS — E78.00 PURE HYPERCHOLESTEROLEMIA: ICD-10-CM

## 2022-04-04 DIAGNOSIS — R73.01 ELEVATED FASTING GLUCOSE: ICD-10-CM

## 2022-04-04 DIAGNOSIS — Z00.00 ANNUAL PHYSICAL EXAM: ICD-10-CM

## 2022-04-04 LAB
25(OH)D3 SERPL-MCNC: 54 NG/ML (ref 30–100)
ALBUMIN SERPL BCP-MCNC: 4.4 G/DL (ref 3.2–4.9)
ALBUMIN/GLOB SERPL: 1.6 G/DL
ALP SERPL-CCNC: 67 U/L (ref 30–99)
ALT SERPL-CCNC: 33 U/L (ref 2–50)
ANION GAP SERPL CALC-SCNC: 9 MMOL/L (ref 7–16)
AST SERPL-CCNC: 26 U/L (ref 12–45)
BASOPHILS # BLD AUTO: 0.9 % (ref 0–1.8)
BASOPHILS # BLD: 0.06 K/UL (ref 0–0.12)
BILIRUB SERPL-MCNC: 0.4 MG/DL (ref 0.1–1.5)
BUN SERPL-MCNC: 21 MG/DL (ref 8–22)
CALCIUM SERPL-MCNC: 9.8 MG/DL (ref 8.4–10.2)
CHLORIDE SERPL-SCNC: 103 MMOL/L (ref 96–112)
CHOLEST SERPL-MCNC: 113 MG/DL (ref 100–199)
CO2 SERPL-SCNC: 25 MMOL/L (ref 20–33)
CREAT SERPL-MCNC: 0.9 MG/DL (ref 0.5–1.4)
CREAT UR-MCNC: 142.09 MG/DL
EOSINOPHIL # BLD AUTO: 0.11 K/UL (ref 0–0.51)
EOSINOPHIL NFR BLD: 1.6 % (ref 0–6.9)
ERYTHROCYTE [DISTWIDTH] IN BLOOD BY AUTOMATED COUNT: 39.8 FL (ref 35.9–50)
FASTING STATUS PATIENT QL REPORTED: NORMAL
GFR SERPLBLD CREATININE-BSD FMLA CKD-EPI: 95 ML/MIN/1.73 M 2
GLOBULIN SER CALC-MCNC: 2.8 G/DL (ref 1.9–3.5)
GLUCOSE SERPL-MCNC: 111 MG/DL (ref 65–99)
HCT VFR BLD AUTO: 48.3 % (ref 42–52)
HDLC SERPL-MCNC: 41 MG/DL
HGB BLD-MCNC: 16 G/DL (ref 14–18)
IMM GRANULOCYTES # BLD AUTO: 0.02 K/UL (ref 0–0.11)
IMM GRANULOCYTES NFR BLD AUTO: 0.3 % (ref 0–0.9)
LDLC SERPL CALC-MCNC: 53 MG/DL
LYMPHOCYTES # BLD AUTO: 2.24 K/UL (ref 1–4.8)
LYMPHOCYTES NFR BLD: 31.8 % (ref 22–41)
MCH RBC QN AUTO: 29.6 PG (ref 27–33)
MCHC RBC AUTO-ENTMCNC: 33.1 G/DL (ref 33.7–35.3)
MCV RBC AUTO: 89.4 FL (ref 81.4–97.8)
MICROALBUMIN UR-MCNC: <1.2 MG/DL
MICROALBUMIN/CREAT UR: NORMAL MG/G (ref 0–30)
MONOCYTES # BLD AUTO: 0.74 K/UL (ref 0–0.85)
MONOCYTES NFR BLD AUTO: 10.5 % (ref 0–13.4)
NEUTROPHILS # BLD AUTO: 3.88 K/UL (ref 1.82–7.42)
NEUTROPHILS NFR BLD: 54.9 % (ref 44–72)
NRBC # BLD AUTO: 0 K/UL
NRBC BLD-RTO: 0 /100 WBC
PLATELET # BLD AUTO: 294 K/UL (ref 164–446)
PMV BLD AUTO: 10.8 FL (ref 9–12.9)
POTASSIUM SERPL-SCNC: 4.5 MMOL/L (ref 3.6–5.5)
PROT SERPL-MCNC: 7.2 G/DL (ref 6–8.2)
RBC # BLD AUTO: 5.4 M/UL (ref 4.7–6.1)
SODIUM SERPL-SCNC: 137 MMOL/L (ref 135–145)
TRIGL SERPL-MCNC: 93 MG/DL (ref 0–149)
WBC # BLD AUTO: 7.1 K/UL (ref 4.8–10.8)

## 2022-04-04 PROCEDURE — 36415 COLL VENOUS BLD VENIPUNCTURE: CPT

## 2022-04-04 PROCEDURE — 83036 HEMOGLOBIN GLYCOSYLATED A1C: CPT | Mod: GA

## 2022-04-04 PROCEDURE — 84153 ASSAY OF PSA TOTAL: CPT | Mod: GA

## 2022-04-04 PROCEDURE — 85025 COMPLETE CBC W/AUTO DIFF WBC: CPT

## 2022-04-04 PROCEDURE — 82306 VITAMIN D 25 HYDROXY: CPT | Mod: GA

## 2022-04-04 PROCEDURE — 82043 UR ALBUMIN QUANTITATIVE: CPT

## 2022-04-04 PROCEDURE — 82570 ASSAY OF URINE CREATININE: CPT

## 2022-04-04 PROCEDURE — 80053 COMPREHEN METABOLIC PANEL: CPT

## 2022-04-04 PROCEDURE — 84154 ASSAY OF PSA FREE: CPT

## 2022-04-04 PROCEDURE — 80061 LIPID PANEL: CPT

## 2022-04-05 LAB
EST. AVERAGE GLUCOSE BLD GHB EST-MCNC: 120 MG/DL
HBA1C MFR BLD: 5.8 % (ref 4–5.6)

## 2022-04-06 ENCOUNTER — OFFICE VISIT (OUTPATIENT)
Dept: CARDIOLOGY | Facility: MEDICAL CENTER | Age: 67
End: 2022-04-06
Payer: MEDICARE

## 2022-04-06 VITALS
RESPIRATION RATE: 16 BRPM | WEIGHT: 196.2 LBS | HEART RATE: 70 BPM | HEIGHT: 73 IN | SYSTOLIC BLOOD PRESSURE: 122 MMHG | OXYGEN SATURATION: 97 % | DIASTOLIC BLOOD PRESSURE: 86 MMHG | BODY MASS INDEX: 26 KG/M2

## 2022-04-06 DIAGNOSIS — Q23.1 BICUSPID AORTIC VALVE: ICD-10-CM

## 2022-04-06 DIAGNOSIS — I35.0 SEVERE AORTIC STENOSIS: Chronic | ICD-10-CM

## 2022-04-06 DIAGNOSIS — Q23.1 CONGENITAL INSUFFICIENCY OF AORTIC VALVE: ICD-10-CM

## 2022-04-06 DIAGNOSIS — I10 ESSENTIAL HYPERTENSION: ICD-10-CM

## 2022-04-06 DIAGNOSIS — E78.00 PURE HYPERCHOLESTEROLEMIA: ICD-10-CM

## 2022-04-06 PROCEDURE — 99214 OFFICE O/P EST MOD 30 MIN: CPT | Performed by: INTERNAL MEDICINE

## 2022-04-06 RX ORDER — LISINOPRIL AND HYDROCHLOROTHIAZIDE 20; 12.5 MG/1; MG/1
1 TABLET ORAL DAILY
Qty: 90 TABLET | Refills: 3 | Status: SHIPPED | OUTPATIENT
Start: 2022-04-06 | End: 2023-04-13

## 2022-04-06 RX ORDER — ROSUVASTATIN CALCIUM 10 MG/1
10 TABLET, COATED ORAL EVERY EVENING
Qty: 90 TABLET | Refills: 3 | Status: SHIPPED | OUTPATIENT
Start: 2022-04-06 | End: 2023-04-24 | Stop reason: SDUPTHER

## 2022-04-06 ASSESSMENT — ENCOUNTER SYMPTOMS
COUGH: 0
CLAUDICATION: 0
CHILLS: 0
SORE THROAT: 0
FEVER: 0
ABDOMINAL PAIN: 0
FOCAL WEAKNESS: 0
FALLS: 0
BRUISES/BLEEDS EASILY: 0
PND: 0
WEAKNESS: 0
DIZZINESS: 0
NAUSEA: 0
PALPITATIONS: 0
BLURRED VISION: 0
SHORTNESS OF BREATH: 0

## 2022-04-06 ASSESSMENT — FIBROSIS 4 INDEX: FIB4 SCORE: 0.99

## 2022-04-06 NOTE — PATIENT INSTRUCTIONS
Work on at least 1.5 - 5 hours a week of moderate exercise (typical brisk walking or similar activity)    If you have had a heart attack, stent, bypass or reduced heart strength (EF <35%): cardiac rehab may reduce your risk of dying by 13-24% and need to go to the hospital by 30% within the first year (1)    Please look into the following diets and incorporate them into your diet    LOW SALT DIET   KEEP YOUR SODIUM EQUAL TO CALORIES AND NO MORE THAN DOUBLE THE CALORIES FOR A LOW SALT DIET    Cardiosmart[employer]org - great resource for American College of Cardiology on heart disease prevention and treatment    FOR TREATMENT OR PREVENTION OF CORONARY ARTERY DISEASE  These three programs are approved by Medicare/Insurers for those with heart disease  [facility] - [facility]  Dr. Levine's Program for Reversing Heart Disease - Alex Moraes's Cardiologist vegetarian-based  [facility] - [place]-based mind-body Program    This is a commonly referenced Program  Dr Cerda - Meghann over [place] (book and documentary) - vegetarian-based    FOR TREATMENT OF BLOOD PRESSURE  DASH DIET - American Heart Association for treatment of HYPERTENSION    FOR TREATMENT OF BAD CHOLESTEROL/FATS  REDUCE PROCESSED SUGAR AS MUCH AS POSSIBLE  INCREASE WHOLE GRAINS/VEGETABLES  INCREASE FIBER    Lowering total cholesterol and LDL (bad) cholesterol:  - Eat leaner cuts of meat, or eliminate altogether if possible red meat, and frequently substitute fish or chicken.  - Limit saturated fat to no more than 7-10% of total calories no more than 10 g per day is recommended. Some sources of saturated fat include butter, animal fats, hydrogenated vegetable fats and oils, many desserts, whole milk dairy products.  - Replaced saturated fats with polyunsaturated fats and monounsaturated fats. Foods high in monounsaturated fat include nuts, canola oil, avocados, and olives.  - Limit trans fat (processed foods)  and replaced with fresh fruits and vegetables  - Recommend nonfat dairy products  - Increase substantially the amount of soluble fiber intake (legumes such as beans, fruit, whole grains).  - Consider nutritional supplements: plant sterile spreads such as Benecol, fish oil,  flaxseed oil, omega-3 acids capsules 1000 mg twice a day, or viscous fiber such as Metamucil  - Attain ideal weight and regular exercise (at least 30 minutes per day of moderate exercise)  ASK ABOUT STATIN OR NON STATIN MEDICATION TO REDUCE YOUR LDL AND HEART RISK    Lowering triglycerides:  - Reduce intake of simple sugar: Desserts, candy, pastries, honey, sodas, sugared cereals, yogurt, Gatorade, sports bars, canned fruit, smoothies, fruit juice, coffee drinks  - Reduced intake of refined starches: Refined Pasta, most bread  - Reduce or abstain from alcohol  - Increase omega-3 fatty acids: White, Trout, Mackerel, Herring, Albacore tuna and supplements  - Attain ideal weight and regular exercise (at least 30 minutes per day of moderate exercise)  ASK ABOUT PURIFIED OMEGA 3 EPA or FISH OIL TO REDUCE YOUR TG AND HEART RISK    Elevating HDL (good) cholesterol:  - Increase physical activity  - Increase omega-3 fatty acids and supplements as listed above  - Incorporating appropriate amounts of monounsaturated fats such as nuts, olive oil, canola oil, avocados, olives  - Stop smoking  - Attain ideal weight and regular exercise (at least 30 minutes per day of moderate exercise)

## 2022-04-06 NOTE — PROGRESS NOTES
Chief Complaint   Patient presents with   • Aortic Stenosis     F/V Dx: Severe aortic stenosis   • Hypertension     F/V Dx: Essential hypertension   • Hyperlipidemia     F/V Dx: Pure hypercholesterolemia       Stephen Epperson is a 66 y.o. male who presents today for follow-up of his history of bicuspid aortic valve with severe stenosis and regurgitation and dyslipidemia    He is done well the past year tolerating his medications well no major health issues  Past Medical History:   Diagnosis Date   • Bicuspid aortic valve 10/14/2020   • Essential hypertension 10/14/2020   • Pure hypercholesterolemia 10/14/2020   • Severe aortic stenosis      History reviewed. No pertinent surgical history.  Family History   Problem Relation Age of Onset   • Heart Disease Mother    • Heart Attack Mother 88   • Heart Disease Father 65        CABG     Social History     Socioeconomic History   • Marital status:      Spouse name: Not on file   • Number of children: Not on file   • Years of education: Not on file   • Highest education level: Master's degree (e.g., MA, MS, Lan, MEd, MSW, JAVIER)   Occupational History   • Not on file   Tobacco Use   • Smoking status: Never Smoker   • Smokeless tobacco: Never Used   Vaping Use   • Vaping Use: Never used   Substance and Sexual Activity   • Alcohol use: Yes   • Drug use: Yes     Types: Marijuana, Inhaled     Comment: Occ   • Sexual activity: Yes   Other Topics Concern   • Not on file   Social History Narrative   • Not on file     Social Determinants of Health     Financial Resource Strain: Low Risk    • Difficulty of Paying Living Expenses: Not hard at all   Food Insecurity: No Food Insecurity   • Worried About Running Out of Food in the Last Year: Never true   • Ran Out of Food in the Last Year: Never true   Transportation Needs: No Transportation Needs   • Lack of Transportation (Medical): No   • Lack of Transportation (Non-Medical): No   Physical Activity: Sufficiently  Active   • Days of Exercise per Week: 6 days   • Minutes of Exercise per Session: 40 min   Stress: No Stress Concern Present   • Feeling of Stress : Not at all   Social Connections: Unknown   • Frequency of Communication with Friends and Family: More than three times a week   • Frequency of Social Gatherings with Friends and Family: Patient refused   • Attends Taoist Services: Patient refused   • Active Member of Clubs or Organizations: Yes   • Attends Club or Organization Meetings: More than 4 times per year   • Marital Status:    Intimate Partner Violence: Not on file   Housing Stability: Low Risk    • Unable to Pay for Housing in the Last Year: No   • Number of Places Lived in the Last Year: 1   • Unstable Housing in the Last Year: No     No Known Allergies  Outpatient Encounter Medications as of 4/6/2022   Medication Sig Dispense Refill   • rosuvastatin (CRESTOR) 10 MG Tab Take 1 Tablet by mouth every evening. **LAST SEEN 10/2020 .  TO ENSURE FURTHER REFILLS, KEEP SCHEDULED FOLLOW UP VISIT 4/6/2022.** 90 Tablet 1   • lisinopril-hydrochlorothiazide (PRINZIDE) 20-12.5 MG per tablet Take 1 Tab by mouth every day. 90 Tab 6   • ASPIRIN 81 PO Take 81 mg by mouth.     • Saw Palmetto-Pumpkin Seed-Zinc 160-40-7.5 MG Cap Take  by mouth.     • Cholecalciferol (VITAMIN D3 PO) Take  by mouth every day.       No facility-administered encounter medications on file as of 4/6/2022.     Review of Systems   Constitutional: Negative for chills and fever.   HENT: Negative for sore throat.    Eyes: Negative for blurred vision.   Respiratory: Negative for cough and shortness of breath.    Cardiovascular: Negative for chest pain, palpitations, claudication, leg swelling and PND.   Gastrointestinal: Negative for abdominal pain and nausea.   Musculoskeletal: Negative for falls and joint pain.   Skin: Negative for rash.   Neurological: Negative for dizziness, focal weakness and weakness.   Endo/Heme/Allergies: Does not  "bruise/bleed easily.              Objective     /86 (BP Location: Left arm, Patient Position: Sitting, BP Cuff Size: Adult)   Pulse 70   Resp 16   Ht 1.854 m (6' 1\")   Wt 89 kg (196 lb 3.2 oz)   SpO2 97%   BMI 25.89 kg/m²     Physical Exam  Constitutional:       General: He is not in acute distress.     Appearance: He is not diaphoretic.   Eyes:      General: No scleral icterus.  Neck:      Vascular: No JVD.   Cardiovascular:      Rate and Rhythm: Normal rate.      Heart sounds: Murmur heard.     No friction rub. No gallop.   Pulmonary:      Effort: No respiratory distress.      Breath sounds: No wheezing or rales.   Abdominal:      General: Bowel sounds are normal.      Palpations: Abdomen is soft.   Skin:     Findings: No rash.   Neurological:      Mental Status: He is alert.            We reviewed in person the most recent labs  Recent Results (from the past 5040 hour(s))   MICROALBUMIN CREAT RATIO URINE    Collection Time: 04/04/22  7:58 AM   Result Value Ref Range    Creatinine, Urine 142.09 mg/dL    Microalbumin, Urine Random <1.2 mg/dL    Micro Alb Creat Ratio see below 0 - 30 mg/g   FASTING STATUS    Collection Time: 04/04/22  7:58 AM   Result Value Ref Range    Fasting Status Fasting    VITAMIN D,25 HYDROXY    Collection Time: 04/04/22  7:59 AM   Result Value Ref Range    25-Hydroxy   Vitamin D 25 54 30 - 100 ng/mL   Lipid Profile    Collection Time: 04/04/22  7:59 AM   Result Value Ref Range    Cholesterol,Tot 113 100 - 199 mg/dL    Triglycerides 93 0 - 149 mg/dL    HDL 41 >=40 mg/dL    LDL 53 <100 mg/dL   HEMOGLOBIN A1C    Collection Time: 04/04/22  7:59 AM   Result Value Ref Range    Glycohemoglobin 5.8 (H) 4.0 - 5.6 %    Est Avg Glucose 120 mg/dL   Comp Metabolic Panel    Collection Time: 04/04/22  7:59 AM   Result Value Ref Range    Sodium 137 135 - 145 mmol/L    Potassium 4.5 3.6 - 5.5 mmol/L    Chloride 103 96 - 112 mmol/L    Co2 25 20 - 33 mmol/L    Anion Gap 9.0 7.0 - 16.0    Glucose " 111 (H) 65 - 99 mg/dL    Bun 21 8 - 22 mg/dL    Creatinine 0.90 0.50 - 1.40 mg/dL    Calcium 9.8 8.4 - 10.2 mg/dL    AST(SGOT) 26 12 - 45 U/L    ALT(SGPT) 33 2 - 50 U/L    Alkaline Phosphatase 67 30 - 99 U/L    Total Bilirubin 0.4 0.1 - 1.5 mg/dL    Albumin 4.4 3.2 - 4.9 g/dL    Total Protein 7.2 6.0 - 8.2 g/dL    Globulin 2.8 1.9 - 3.5 g/dL    A-G Ratio 1.6 g/dL   CBC WITH DIFFERENTIAL    Collection Time: 04/04/22  7:59 AM   Result Value Ref Range    WBC 7.1 4.8 - 10.8 K/uL    RBC 5.40 4.70 - 6.10 M/uL    Hemoglobin 16.0 14.0 - 18.0 g/dL    Hematocrit 48.3 42.0 - 52.0 %    MCV 89.4 81.4 - 97.8 fL    MCH 29.6 27.0 - 33.0 pg    MCHC 33.1 (L) 33.7 - 35.3 g/dL    RDW 39.8 35.9 - 50.0 fL    Platelet Count 294 164 - 446 K/uL    MPV 10.8 9.0 - 12.9 fL    Neutrophils-Polys 54.90 44.00 - 72.00 %    Lymphocytes 31.80 22.00 - 41.00 %    Monocytes 10.50 0.00 - 13.40 %    Eosinophils 1.60 0.00 - 6.90 %    Basophils 0.90 0.00 - 1.80 %    Immature Granulocytes 0.30 0.00 - 0.90 %    Nucleated RBC 0.00 /100 WBC    Neutrophils (Absolute) 3.88 1.82 - 7.42 K/uL    Lymphs (Absolute) 2.24 1.00 - 4.80 K/uL    Monos (Absolute) 0.74 0.00 - 0.85 K/uL    Eos (Absolute) 0.11 0.00 - 0.51 K/uL    Baso (Absolute) 0.06 0.00 - 0.12 K/uL    Immature Granulocytes (abs) 0.02 0.00 - 0.11 K/uL    NRBC (Absolute) 0.00 K/uL   ESTIMATED GFR    Collection Time: 04/04/22  7:59 AM   Result Value Ref Range    GFR (CKD-EPI) 95 >60 mL/min/1.73 m 2         Assessment & Plan     1. Bicuspid aortic valve  EC-ECHOCARDIOGRAM COMPLETE W/O CONT   2. Congenital insufficiency of aortic valve  EC-ECHOCARDIOGRAM COMPLETE W/O CONT   3. Essential hypertension  lisinopril-hydrochlorothiazide (PRINZIDE) 20-12.5 MG per tablet   4. Severe aortic stenosis  EC-ECHOCARDIOGRAM COMPLETE W/O CONT   5. Pure hypercholesterolemia  rosuvastatin (CRESTOR) 10 MG Tab       Medical Decision Making: Today's Assessment/Status/Plan:          It was my pleasure to meet with Mr. Epperson.    We  addressed the management of hypertension at today's visit. Blood pressure is well controlled.  We specifically assessed the labs on hypertension treatment    We addressed the management of dyslipidemia at today's visit. He is on appropriate statin.    Update his echocardiogram we are waiting to see data and evidence for transcutaneous treatment    I will see Mr. Epperson back in 1 year time and encouraged him to follow up with us over the phone or electronically using my MyChart as issues arise.    It is my pleasure to participate in the care of Mr. Epperson.  Please do not hesitate to contact me with questions or concerns.    Shaggy Roberts MD PhD [facility]  Cardiologist [facility]    Please note that this dictation was created using voice recognition software. There may be errors I did not discover before finalizing the note.

## 2022-04-07 LAB
PSA FREE MFR SERPL: 38 %
PSA FREE SERPL-MCNC: 0.3 NG/ML
PSA SERPL-MCNC: 0.8 NG/ML (ref 0–4)

## 2022-04-23 SDOH — ECONOMIC STABILITY: FOOD INSECURITY: WITHIN THE PAST 12 MONTHS, YOU WORRIED THAT YOUR FOOD WOULD RUN OUT BEFORE YOU GOT MONEY TO BUY MORE.: NEVER TRUE

## 2022-04-23 SDOH — ECONOMIC STABILITY: HOUSING INSECURITY

## 2022-04-23 SDOH — ECONOMIC STABILITY: HOUSING INSECURITY: IN THE LAST 12 MONTHS, HOW MANY PLACES HAVE YOU LIVED?: 1

## 2022-04-23 SDOH — ECONOMIC STABILITY: FOOD INSECURITY: WITHIN THE PAST 12 MONTHS, THE FOOD YOU BOUGHT JUST DIDN'T LAST AND YOU DIDN'T HAVE MONEY TO GET MORE.: NEVER TRUE

## 2022-04-23 SDOH — HEALTH STABILITY: PHYSICAL HEALTH: ON AVERAGE, HOW MANY MINUTES DO YOU ENGAGE IN EXERCISE AT THIS LEVEL?: 90 MIN

## 2022-04-23 SDOH — ECONOMIC STABILITY: TRANSPORTATION INSECURITY
IN THE PAST 12 MONTHS, HAS LACK OF TRANSPORTATION KEPT YOU FROM MEETINGS, WORK, OR FROM GETTING THINGS NEEDED FOR DAILY LIVING?: NO

## 2022-04-23 SDOH — HEALTH STABILITY: PHYSICAL HEALTH: ON AVERAGE, HOW MANY DAYS PER WEEK DO YOU ENGAGE IN MODERATE TO STRENUOUS EXERCISE (LIKE A BRISK WALK)?: 5 DAYS

## 2022-04-23 ASSESSMENT — SOCIAL DETERMINANTS OF HEALTH (SDOH)
HOW OFTEN DO YOU HAVE A DRINK CONTAINING ALCOHOL: 2-4 TIMES A MONTH
DO YOU BELONG TO ANY CLUBS OR ORGANIZATIONS SUCH AS CHURCH GROUPS UNIONS, FRATERNAL OR ATHLETIC GROUPS, OR SCHOOL GROUPS?: YES
DO YOU BELONG TO ANY CLUBS OR ORGANIZATIONS SUCH AS CHURCH GROUPS UNIONS, FRATERNAL OR ATHLETIC GROUPS, OR SCHOOL GROUPS?: YES
IN A TYPICAL WEEK, HOW MANY TIMES DO YOU TALK ON THE PHONE WITH FAMILY, FRIENDS, OR NEIGHBORS?: THREE TIMES A WEEK
HOW OFTEN DO YOU ATTENT MEETINGS OF THE CLUB OR ORGANIZATION YOU BELONG TO?: MORE THAN 4 TIMES PER YEAR
WITHIN THE PAST 12 MONTHS, YOU WORRIED THAT YOUR FOOD WOULD RUN OUT BEFORE YOU GOT THE MONEY TO BUY MORE: NEVER TRUE
HOW OFTEN DO YOU ATTEND CHURCH OR RELIGIOUS SERVICES?: PATIENT DECLINED
HOW OFTEN DO YOU HAVE SIX OR MORE DRINKS ON ONE OCCASION: NEVER
HOW OFTEN DO YOU GET TOGETHER WITH FRIENDS OR RELATIVES?: THREE TIMES A WEEK
HOW OFTEN DO YOU GET TOGETHER WITH FRIENDS OR RELATIVES?: THREE TIMES A WEEK
HOW MANY DRINKS CONTAINING ALCOHOL DO YOU HAVE ON A TYPICAL DAY WHEN YOU ARE DRINKING: 1 OR 2
HOW OFTEN DO YOU ATTEND CHURCH OR RELIGIOUS SERVICES?: PATIENT DECLINED
HOW OFTEN DO YOU ATTENT MEETINGS OF THE CLUB OR ORGANIZATION YOU BELONG TO?: MORE THAN 4 TIMES PER YEAR
IN A TYPICAL WEEK, HOW MANY TIMES DO YOU TALK ON THE PHONE WITH FAMILY, FRIENDS, OR NEIGHBORS?: THREE TIMES A WEEK

## 2022-04-23 ASSESSMENT — LIFESTYLE VARIABLES
HOW MANY STANDARD DRINKS CONTAINING ALCOHOL DO YOU HAVE ON A TYPICAL DAY: 1 OR 2
HOW OFTEN DO YOU HAVE A DRINK CONTAINING ALCOHOL: 2-4 TIMES A MONTH
HOW OFTEN DO YOU HAVE SIX OR MORE DRINKS ON ONE OCCASION: NEVER

## 2022-04-25 ENCOUNTER — OFFICE VISIT (OUTPATIENT)
Dept: MEDICAL GROUP | Facility: MEDICAL CENTER | Age: 67
End: 2022-04-25
Payer: MEDICARE

## 2022-04-25 VITALS
HEART RATE: 60 BPM | SYSTOLIC BLOOD PRESSURE: 130 MMHG | WEIGHT: 195 LBS | BODY MASS INDEX: 25.84 KG/M2 | DIASTOLIC BLOOD PRESSURE: 80 MMHG | TEMPERATURE: 97.9 F | HEIGHT: 73 IN | OXYGEN SATURATION: 99 %

## 2022-04-25 DIAGNOSIS — Z00.00 ENCOUNTER FOR ANNUAL WELLNESS EXAM IN MEDICARE PATIENT: ICD-10-CM

## 2022-04-25 DIAGNOSIS — E78.00 PURE HYPERCHOLESTEROLEMIA: ICD-10-CM

## 2022-04-25 DIAGNOSIS — I35.0 SEVERE AORTIC STENOSIS: Chronic | ICD-10-CM

## 2022-04-25 DIAGNOSIS — R73.03 PREDIABETES: ICD-10-CM

## 2022-04-25 DIAGNOSIS — I10 ESSENTIAL HYPERTENSION: ICD-10-CM

## 2022-04-25 PROCEDURE — 99999 PR NO CHARGE: CPT | Performed by: NURSE PRACTITIONER

## 2022-04-25 PROCEDURE — G0402 INITIAL PREVENTIVE EXAM: HCPCS | Performed by: NURSE PRACTITIONER

## 2022-04-25 ASSESSMENT — PATIENT HEALTH QUESTIONNAIRE - PHQ9: CLINICAL INTERPRETATION OF PHQ2 SCORE: 0

## 2022-04-25 ASSESSMENT — ACTIVITIES OF DAILY LIVING (ADL): BATHING_REQUIRES_ASSISTANCE: 0

## 2022-04-25 ASSESSMENT — FIBROSIS 4 INDEX: FIB4 SCORE: 1

## 2022-04-25 ASSESSMENT — ENCOUNTER SYMPTOMS: GENERAL WELL-BEING: EXCELLENT

## 2022-04-25 NOTE — ASSESSMENT & PLAN NOTE
Chronic. Stable on lisinopril-hctz 20-12.5 mg daily.     BP running 110-120/75 at home, checking daily at different times.     Denies chest pain, headaches, dizziness.

## 2022-04-25 NOTE — ASSESSMENT & PLAN NOTE
New problem today, A1C 5.8 on recent labs. He has significantly reduced his sugar intake, was eating a lot. Limits carbs in diet. Does have whole grain bread 2-3 slices per day.     Fam hx diabetes in Maternal Gmother.

## 2022-04-25 NOTE — PROGRESS NOTES
HPI:  Lionel Epperson is a 67 y.o. here for Medicare Annual Wellness Visit     Patient Active Problem List    Diagnosis Date Noted   • Prediabetes 04/25/2022   • Severe aortic stenosis    • Bicuspid aortic valve 10/14/2020   • Essential hypertension 10/14/2020   • Pure hypercholesterolemia 10/14/2020   • Healthcare maintenance 10/14/2020   • Dizziness 10/14/2020   • Low HDL (under 40) 06/18/2015   • Congenital insufficiency of aortic valve 09/27/2003       Current Outpatient Medications   Medication Sig Dispense Refill   • rosuvastatin (CRESTOR) 10 MG Tab Take 1 Tablet by mouth every evening. 90 Tablet 3   • lisinopril-hydrochlorothiazide (PRINZIDE) 20-12.5 MG per tablet Take 1 Tablet by mouth every day. 90 Tablet 3   • ASPIRIN 81 PO Take 81 mg by mouth.     • Saw Palmetto-Pumpkin Seed-Zinc 160-40-7.5 MG Cap Take  by mouth.     • Cholecalciferol (VITAMIN D3 PO) Take  by mouth every day.       No current facility-administered medications for this visit.          Current supplements as per medication list.     Allergies: Patient has no known allergies.    Current social contact/activities:  Play golf, ski, time with friends, non profit work     He  reports that he has never smoked. He has never used smokeless tobacco. He reports current alcohol use. He reports current drug use. Drugs: Marijuana and Inhaled.  Counseling given: Not Answered      DPA/Advanced Directive:  Patient has Advanced Directive, Living Will and Durable Power of , but it is not on file. Instructed to bring in a copy to scan into their chart.    ROS:    Gait: Uses no assistive device  Ostomy: No  Other tubes: No  Amputations: No  Chronic oxygen use: No  Last eye exam: Due  Wears hearing aids: No   : Denies any urinary leakage during the last 6 months    Screening:    Depression Screening  Little interest or pleasure in doing things?  0 - not at all  Feeling down, depressed , or hopeless? 0 - not at all  Patient Health Questionnaire  Score: 0     If depressive symptoms identified deferred to follow up visit unless specifically addressed in assessment and plan.    Interpretation of PHQ-9 Total Score   Score Severity   1-4 No Depression   5-9 Mild Depression   10-14 Moderate Depression   15-19 Moderately Severe Depression   20-27 Severe Depression    Screening for Cognitive Impairment  Three Minute Recall (daughter, heaven, mountain) 2/3    Lance clock face with all 12 numbers and set the hands to show 10 past 11.  Yes    Cognitive concerns identified deferred for follow up unless specifically addressed in assessment and plan.    Fall Risk Assessment  Has the patient had two or more falls in the last year or any fall with injury in the last year?  No    Safety Assessment  Throw rugs on floor.  No  Handrails on all stairs.  No  Good lighting in all hallways.  Yes  Difficulty hearing.  No  Patient counseled about all safety risks that were identified.    Functional Assessment ADLs  Are there any barriers preventing you from cooking for yourself or meeting nutritional needs?  No.    Are there any barriers preventing you from driving safely or obtaining transportation?  No.    Are there any barriers preventing you from using a telephone or calling for help?  No.    Are there any barriers preventing you from shopping?  No.    Are there any barriers preventing you from taking care of your own finances?  No.    Are there any barriers preventing you from managing your medications?  No.    Are there any barriers preventing you from showering, bathing or dressing yourself?  No.    Are you currently engaging in any exercise or physical activity?  Yes.     What is your perception of your health?  Excellent.      Health Maintenance Summary          Overdue - Annual Wellness Visit (Once) Overdue - never done    No completion history exists for this topic.          COLORECTAL CANCER SCREENING (COLONOSCOPY - Every 5 Years) Next due on 11/30/2023 11/30/2018   COLONOSCOPY (Done)          IMM DTaP/Tdap/Td Vaccine (3 - Td or Tdap) Next due on 3/19/2029    03/19/2019  Imm Admin: TD Vaccine    10/03/2008  Imm Admin: Tdap Vaccine    01/01/1995  Imm Admin: dT Vaccine - HISTORICAL DATA          HEPATITIS C SCREENING  Completed    04/13/2021  HEP C VIRUS ANTIBODY          IMM INFLUENZA (Series Information) Completed    10/04/2021  Imm Admin: Influenza Vaccine Quad Inj (Pf)    10/14/2020  Imm Admin: Influenza Vaccine Quad Inj (Pf)    10/17/2018  Imm Admin: Influenza Vaccine Quad Inj (Preserved)    01/12/2018  Imm Admin: Influenza Vaccine-Flucelvax - HISTORICAL DATA    01/16/2017  Imm Admin: Influenza Vaccine Quad Inj (Pf)    Only the first 5 history entries have been loaded, but more history exists.          COVID-19 Vaccine (Series Information) Completed    03/31/2022  Imm Admin: Moderna SARS-CoV-2 Vaccine    10/26/2021  Imm Admin: Moderna SARS-CoV-2 Vaccine    04/17/2021  Imm Admin: Moderna SARS-CoV-2 Vaccine    03/19/2021  Imm Admin: Moderna SARS-CoV-2 Vaccine          IMM ZOSTER VACCINES (Series Information) Completed    04/19/2022  Imm Admin: Zoster Vaccine Recombinant (RZV) (SHINGRIX)    10/04/2021  Imm Admin: Zoster Vaccine Recombinant (RZV) (SHINGRIX)    11/01/2012  Imm Admin: Zoster Vaccine Live (ZVL) (Zostavax) - HISTORICAL DATA          IMM PNEUMOCOCCAL VACCINE: 65+ Years (Series Information) Completed    04/19/2022  Imm Admin: Pneumococcal Conjugate Vaccine (PCV20)    03/19/2019  Imm Admin: Pneumococcal polysaccharide vaccine (PPSV-23)          IMM HEP B VACCINE (Series Information) Aged Out    No completion history exists for this topic.          IMM MENINGOCOCCAL VACCINE (MCV4) (Series Information) Aged Out    No completion history exists for this topic.                Patient Care Team:  KHALIF George as PCP - General (Family Medicine)        Social History     Tobacco Use   • Smoking status: Never Smoker   • Smokeless tobacco: Never Used   Vaping Use  "  • Vaping Use: Never used   Substance Use Topics   • Alcohol use: Yes   • Drug use: Yes     Types: Marijuana, Inhaled     Comment: Occ     Family History   Problem Relation Age of Onset   • Heart Disease Mother    • Heart Attack Mother 88   • Heart Disease Father 65        CABG     He  has a past medical history of Bicuspid aortic valve (10/14/2020), Essential hypertension (10/14/2020), Pure hypercholesterolemia (10/14/2020), and Severe aortic stenosis.   History reviewed. No pertinent surgical history.    Exam:   /80   Pulse 60   Temp 36.6 °C (97.9 °F) (Temporal)   Ht 1.854 m (6' 1\")   Wt 88.5 kg (195 lb)   SpO2 99%  Body mass index is 25.73 kg/m².    Hearing good.    Dentition good  Alert, oriented in no acute distress.  Eye contact is good, speech goal directed, affect calm    Assessment and Plan. The following treatment and monitoring plan is recommended:      1. Encounter for annual wellness exam in Medicare patient  Labs and health maintenance reviewed and up to date    2. Essential hypertension  Stable  Slightly elevated in office today, stable on home  Continue lisinopril-HCTZ 20-12.5 mg daily    3. Severe aortic stenosis  Stable, asymptomatic  Continue follow up with cardiology annually  May need valve replacement in future per cardiology    4. Pure hypercholesterolemia  Stable  Continue rosuvastatin 10 mg daily    5. Prediabetes  Unstable  Discussed dietary changes, he has already been making them  Repeat A1C next year    Services suggested: No services needed at this time  Health Care Screening: Age-appropriate preventive services recommended by USPTF and ACIP covered by Medicare were discussed today. Services ordered if indicated and agreed upon by the patient.  Referrals offered: Community-based lifestyle interventions to reduce health risks and promote self-management and wellness, fall prevention, nutrition, physical activity, tobacco-use cessation, weight loss, and mental health services " as per orders if indicated.    Discussion today about general wellness and lifestyle habits:    · Prevent falls and reduce trip hazards; Cautioned about securing or removing rugs.  · Have a working fire alarm and carbon monoxide detector;   · Engage in regular physical activity and social activities     Follow-up: Return in about 1 year (around 4/25/2023).

## 2022-04-25 NOTE — ASSESSMENT & PLAN NOTE
Chronic. Stable, asymptomatic. Established with cardiology. No intervention planned at this time. Repeat Echo ordered.

## 2022-06-23 ENCOUNTER — TELEPHONE (OUTPATIENT)
Dept: MEDICAL GROUP | Facility: MEDICAL CENTER | Age: 67
End: 2022-06-23
Payer: MEDICARE

## 2022-06-23 DIAGNOSIS — H93.19 TINNITUS, UNSPECIFIED LATERALITY: ICD-10-CM

## 2022-11-10 ENCOUNTER — PATIENT MESSAGE (OUTPATIENT)
Dept: HEALTH INFORMATION MANAGEMENT | Facility: OTHER | Age: 67
End: 2022-11-10

## 2023-04-13 DIAGNOSIS — I10 ESSENTIAL HYPERTENSION: ICD-10-CM

## 2023-04-13 RX ORDER — LISINOPRIL AND HYDROCHLOROTHIAZIDE 20; 12.5 MG/1; MG/1
1 TABLET ORAL DAILY
Qty: 90 TABLET | Refills: 3 | Status: SHIPPED | OUTPATIENT
Start: 2023-04-13 | End: 2023-04-24 | Stop reason: SDUPTHER

## 2023-04-13 NOTE — TELEPHONE ENCOUNTER
Courtesy refill. Please schedule follow up appt for future refills and complete ordered lab work. Thank you

## 2023-04-21 SDOH — ECONOMIC STABILITY: FOOD INSECURITY: WITHIN THE PAST 12 MONTHS, YOU WORRIED THAT YOUR FOOD WOULD RUN OUT BEFORE YOU GOT MONEY TO BUY MORE.: NEVER TRUE

## 2023-04-21 SDOH — HEALTH STABILITY: PHYSICAL HEALTH: ON AVERAGE, HOW MANY MINUTES DO YOU ENGAGE IN EXERCISE AT THIS LEVEL?: 90 MIN

## 2023-04-21 SDOH — ECONOMIC STABILITY: FOOD INSECURITY: WITHIN THE PAST 12 MONTHS, THE FOOD YOU BOUGHT JUST DIDN'T LAST AND YOU DIDN'T HAVE MONEY TO GET MORE.: NEVER TRUE

## 2023-04-21 SDOH — HEALTH STABILITY: PHYSICAL HEALTH: ON AVERAGE, HOW MANY DAYS PER WEEK DO YOU ENGAGE IN MODERATE TO STRENUOUS EXERCISE (LIKE A BRISK WALK)?: 5 DAYS

## 2023-04-21 SDOH — ECONOMIC STABILITY: HOUSING INSECURITY: IN THE LAST 12 MONTHS, HOW MANY PLACES HAVE YOU LIVED?: 1

## 2023-04-21 SDOH — ECONOMIC STABILITY: HOUSING INSECURITY

## 2023-04-21 ASSESSMENT — LIFESTYLE VARIABLES
SKIP TO QUESTIONS 9-10: 1
HOW OFTEN DO YOU HAVE A DRINK CONTAINING ALCOHOL: 2-4 TIMES A MONTH
AUDIT-C TOTAL SCORE: 2
HOW OFTEN DO YOU HAVE SIX OR MORE DRINKS ON ONE OCCASION: NEVER
HOW MANY STANDARD DRINKS CONTAINING ALCOHOL DO YOU HAVE ON A TYPICAL DAY: 1 OR 2

## 2023-04-21 ASSESSMENT — SOCIAL DETERMINANTS OF HEALTH (SDOH)
HOW OFTEN DO YOU ATTEND CHURCH OR RELIGIOUS SERVICES?: PATIENT DECLINED
DO YOU BELONG TO ANY CLUBS OR ORGANIZATIONS SUCH AS CHURCH GROUPS UNIONS, FRATERNAL OR ATHLETIC GROUPS, OR SCHOOL GROUPS?: YES
HOW OFTEN DO YOU HAVE SIX OR MORE DRINKS ON ONE OCCASION: NEVER
HOW OFTEN DO YOU ATTEND CHURCH OR RELIGIOUS SERVICES?: PATIENT DECLINED
HOW OFTEN DO YOU ATTENT MEETINGS OF THE CLUB OR ORGANIZATION YOU BELONG TO?: MORE THAN 4 TIMES PER YEAR
DO YOU BELONG TO ANY CLUBS OR ORGANIZATIONS SUCH AS CHURCH GROUPS UNIONS, FRATERNAL OR ATHLETIC GROUPS, OR SCHOOL GROUPS?: YES
IN A TYPICAL WEEK, HOW MANY TIMES DO YOU TALK ON THE PHONE WITH FAMILY, FRIENDS, OR NEIGHBORS?: THREE TIMES A WEEK
HOW OFTEN DO YOU HAVE A DRINK CONTAINING ALCOHOL: 2-4 TIMES A MONTH
HOW OFTEN DO YOU ATTENT MEETINGS OF THE CLUB OR ORGANIZATION YOU BELONG TO?: MORE THAN 4 TIMES PER YEAR
WITHIN THE PAST 12 MONTHS, YOU WORRIED THAT YOUR FOOD WOULD RUN OUT BEFORE YOU GOT THE MONEY TO BUY MORE: NEVER TRUE
IN A TYPICAL WEEK, HOW MANY TIMES DO YOU TALK ON THE PHONE WITH FAMILY, FRIENDS, OR NEIGHBORS?: THREE TIMES A WEEK
HOW MANY DRINKS CONTAINING ALCOHOL DO YOU HAVE ON A TYPICAL DAY WHEN YOU ARE DRINKING: 1 OR 2
HOW OFTEN DO YOU GET TOGETHER WITH FRIENDS OR RELATIVES?: THREE TIMES A WEEK
HOW OFTEN DO YOU GET TOGETHER WITH FRIENDS OR RELATIVES?: THREE TIMES A WEEK

## 2023-04-24 ENCOUNTER — OFFICE VISIT (OUTPATIENT)
Dept: MEDICAL GROUP | Facility: MEDICAL CENTER | Age: 68
End: 2023-04-24
Payer: MEDICARE

## 2023-04-24 VITALS
HEIGHT: 73 IN | HEART RATE: 55 BPM | TEMPERATURE: 97.6 F | SYSTOLIC BLOOD PRESSURE: 138 MMHG | OXYGEN SATURATION: 100 % | BODY MASS INDEX: 24.92 KG/M2 | RESPIRATION RATE: 16 BRPM | DIASTOLIC BLOOD PRESSURE: 80 MMHG | WEIGHT: 188 LBS

## 2023-04-24 DIAGNOSIS — Z00.00 MEDICARE ANNUAL WELLNESS VISIT, SUBSEQUENT: ICD-10-CM

## 2023-04-24 DIAGNOSIS — I10 ESSENTIAL HYPERTENSION: ICD-10-CM

## 2023-04-24 DIAGNOSIS — Z12.5 SCREENING PSA (PROSTATE SPECIFIC ANTIGEN): ICD-10-CM

## 2023-04-24 DIAGNOSIS — R42 DIZZINESS: ICD-10-CM

## 2023-04-24 DIAGNOSIS — E78.00 PURE HYPERCHOLESTEROLEMIA: ICD-10-CM

## 2023-04-24 DIAGNOSIS — R73.03 PREDIABETES: ICD-10-CM

## 2023-04-24 DIAGNOSIS — I35.0 SEVERE AORTIC STENOSIS: Chronic | ICD-10-CM

## 2023-04-24 DIAGNOSIS — Q23.1 CONGENITAL INSUFFICIENCY OF AORTIC VALVE: ICD-10-CM

## 2023-04-24 PROCEDURE — G0439 PPPS, SUBSEQ VISIT: HCPCS | Performed by: NURSE PRACTITIONER

## 2023-04-24 RX ORDER — AMOXICILLIN 500 MG/1
CAPSULE ORAL
COMMUNITY
Start: 2023-04-05

## 2023-04-24 RX ORDER — ROSUVASTATIN CALCIUM 10 MG/1
10 TABLET, COATED ORAL EVERY EVENING
Qty: 90 TABLET | Refills: 3 | Status: SHIPPED | OUTPATIENT
Start: 2023-04-24 | End: 2024-02-17 | Stop reason: SDUPTHER

## 2023-04-24 RX ORDER — LISINOPRIL AND HYDROCHLOROTHIAZIDE 20; 12.5 MG/1; MG/1
2 TABLET ORAL DAILY
Qty: 180 TABLET | Refills: 3 | Status: SHIPPED | OUTPATIENT
Start: 2023-04-24

## 2023-04-24 ASSESSMENT — PATIENT HEALTH QUESTIONNAIRE - PHQ9: CLINICAL INTERPRETATION OF PHQ2 SCORE: 0

## 2023-04-24 ASSESSMENT — ACTIVITIES OF DAILY LIVING (ADL): BATHING_REQUIRES_ASSISTANCE: 0

## 2023-04-24 ASSESSMENT — FIBROSIS 4 INDEX: FIB4 SCORE: 1.02

## 2023-04-24 ASSESSMENT — ENCOUNTER SYMPTOMS: GENERAL WELL-BEING: EXCELLENT

## 2023-04-24 NOTE — PROGRESS NOTES
Chief Complaint   Patient presents with    Annual Exam       HPI:  Lionel Epperson is a 68 y.o. here for Medicare Annual Wellness Visit     Patient Active Problem List    Diagnosis Date Noted    Prediabetes 04/25/2022    Severe aortic stenosis     Bicuspid aortic valve 10/14/2020    Essential hypertension 10/14/2020    Pure hypercholesterolemia 10/14/2020    Healthcare maintenance 10/14/2020    Dizziness 10/14/2020    Low HDL (under 40) 06/18/2015    Congenital insufficiency of aortic valve 09/27/2003       Current Outpatient Medications   Medication Sig Dispense Refill    amoxicillin (AMOXIL) 500 MG Cap TAKE 4 CAPSULES BY MOUTH 1 HOUR PRIOR TO APPOINTMENT      lisinopril-hydrochlorothiazide (PRINZIDE) 20-12.5 MG per tablet TAKE 1 TABLET BY MOUTH EVERY DAY 90 Tablet 3    rosuvastatin (CRESTOR) 10 MG Tab Take 1 Tablet by mouth every evening. 90 Tablet 3    ASPIRIN 81 PO Take 81 mg by mouth.      Saw Palmetto-Pumpkin Seed-Zinc 160-40-7.5 MG Cap Take  by mouth.      Cholecalciferol (VITAMIN D3 PO) Take  by mouth every day.       No current facility-administered medications for this visit.          Current supplements as per medication list.     Allergies: Patient has no known allergies.    Current social contact/activities: Skiing, golf, swimming, hiking, line dancing, time with friends and family    He  reports that he has never smoked. He has never used smokeless tobacco. He reports current alcohol use. He reports current drug use. Drugs: Marijuana and Inhaled.  Counseling given: Not Answered      ROS:    Gait: Uses no assistive device  Ostomy: No  Other tubes: No  Amputations: No  Chronic oxygen use: No  Last eye exam: 2015  Wears hearing aids: No   : Denies any urinary leakage during the last 6 months    Screening:    Depression Screening  Little interest or pleasure in doing things?  0 - not at all  Feeling down, depressed , or hopeless? 0 - not at all  Patient Health Questionnaire Score: 0     If  depressive symptoms identified deferred to follow up visit unless specifically addressed in assessment and plan.    Interpretation of PHQ-9 Total Score   Score Severity   1-4 No Depression   5-9 Mild Depression   10-14 Moderate Depression   15-19 Moderately Severe Depression   20-27 Severe Depression    Screening for Cognitive Impairment  Three Minute Recall (daughter, heaven, mountain) 3/3    Lance clock face with all 12 numbers and set the hands to show 10 past 11.  Yes    Cognitive concerns identified deferred for follow up unless specifically addressed in assessment and plan.    Fall Risk Assessment  Has the patient had two or more falls in the last year or any fall with injury in the last year?  No    Safety Assessment  Throw rugs on floor.  No  Handrails on all stairs.  Yes  Good lighting in all hallways.  Yes  Difficulty hearing.  No  Patient counseled about all safety risks that were identified.    Functional Assessment ADLs  Are there any barriers preventing you from cooking for yourself or meeting nutritional needs?  No.    Are there any barriers preventing you from driving safely or obtaining transportation?  No.    Are there any barriers preventing you from using a telephone or calling for help?  No.    Are there any barriers preventing you from shopping?  No.    Are there any barriers preventing you from taking care of your own finances?  No.    Are there any barriers preventing you from managing your medications?  No.    Are there any barriers preventing you from showering, bathing or dressing yourself?  No.    Are you currently engaging in any exercise or physical activity?  Yes.  Dre, walking, hiking, gym, golf, swim, dancing.  What is your perception of your health?  Excellent    Advance Care Planning  Do you have an Advance Directive, Living Will, Durable Power of , or POLST? Yes  Advance Directive Living Will     is on file      Health Maintenance Summary            Overdue - Annual  Wellness Visit (Every 366 Days) Overdue - never done      No completion history exists for this topic.              Overdue - COVID-19 Vaccine (5 - Booster for Moderna series) Overdue since 11/22/2022 09/27/2022  Outside Immunization: COVID mRNA Bivalent(MOD)    03/31/2022  Imm Admin: MODERNA SARS-COV-2 VACCINE (12+)    10/26/2021  Outside Immunization: COVID-19 mRNA (MOD)    04/17/2021  Imm Admin: MODERNA SARS-COV-2 VACCINE (12+)    03/19/2021  Imm Admin: MODERNA SARS-COV-2 VACCINE (12+)              COLORECTAL CANCER SCREENING (COLONOSCOPY - Every 5 Years) Next due on 11/30/2023 11/30/2018  COLONOSCOPY (Done)              IMM DTaP/Tdap/Td Vaccine (3 - Td or Tdap) Next due on 3/19/2029      03/19/2019  Imm Admin: TD Vaccine    10/03/2008  Imm Admin: Tdap Vaccine    01/01/1995  Imm Admin: dT Vaccine - HISTORICAL DATA              HEPATITIS C SCREENING  Completed      04/13/2021  HEP C VIRUS ANTIBODY              IMM ZOSTER VACCINES (Series Information) Completed      04/19/2022  Imm Admin: Zoster Vaccine Recombinant (RZV) (SHINGRIX)    10/04/2021  Imm Admin: Zoster Vaccine Recombinant (RZV) (SHINGRIX)    11/01/2012  Imm Admin: Zoster Vaccine Live (ZVL) (Zostavax) - HISTORICAL DATA              IMM PNEUMOCOCCAL VACCINE: 65+ Years (Series Information) Completed      04/19/2022  Imm Admin: Pneumococcal Conjugate Vaccine (PCV20)    03/19/2019  Imm Admin: Pneumococcal polysaccharide vaccine (PPSV-23)              IMM INFLUENZA (Series Information) Completed      11/07/2022  Outside Immunization: Fluzone High-Dose Quad    10/04/2021  Imm Admin: Influenza Vaccine Quad Inj (Pf)    10/14/2020  Imm Admin: Influenza Vaccine Quad Inj (Pf)    10/17/2018  Imm Admin: Influenza Vaccine Quad Inj (Preserved)    01/12/2018  Imm Admin: Influenza Vaccine-Flucelvax - HISTORICAL DATA    Only the first 5 history entries have been loaded, but more history exists.              IMM HEP B VACCINE (Series Information) Aged Out      No  "completion history exists for this topic.              IMM MENINGOCOCCAL ACWY VACCINE (Series Information) Aged Out      No completion history exists for this topic.                    Patient Care Team:  KHALIF George as PCP - General (Family Medicine)        Social History     Tobacco Use    Smoking status: Never    Smokeless tobacco: Never   Vaping Use    Vaping Use: Never used   Substance Use Topics    Alcohol use: Yes    Drug use: Yes     Types: Marijuana, Inhaled     Comment: Occ     Family History   Problem Relation Age of Onset    Heart Disease Mother     Heart Attack Mother 88    Heart Disease Father 65        CABG     He  has a past medical history of Bicuspid aortic valve (10/14/2020), Essential hypertension (10/14/2020), Pure hypercholesterolemia (10/14/2020), and Severe aortic stenosis.   No past surgical history on file.    Exam:   /80 (Patient Position: Sitting)   Pulse (!) 55   Temp 36.4 °C (97.6 °F) (Temporal)   Resp 16   Ht 1.854 m (6' 1\")   Wt 85.3 kg (188 lb)   SpO2 100%  Body mass index is 24.8 kg/m².    Hearing fair, going to see a tinnitus specialist  Dentition good  Alert, oriented in no acute distress.  Eye contact is good, speech goal directed, affect calm    Assessment and Plan. The following treatment and monitoring plan is recommended:      1. Medicare annual wellness visit, subsequent  Annual labs and health maintenance reviewed and updated.   Patient and I discussed the importance of lifestyle changes, with particular emphasis on decreasing sugar and carbohydrate intake and increasing plant-based nutrition (for the purposes of weight loss, general health, and prevention of chronic illnesses), as well as regular cardiovascular exercise, proper sleep, and stress management. Patient verbalized understanding.    2. Pure hypercholesterolemia  Stable on rosuvastatin 10 mg daily    3. Prediabetes  Stable, due for labs  - HEMOGLOBIN A1C; Future    4. Essential " hypertension  Unstable  Increase lisinopril-hctz 20-12.5 mg to 2 tablets, daily, continue to monitor at home. Return to clinic if BP is running above 120s/80s.   Has been increasing overall, running mid to high 130s at home. Better after exercise in the 120s.   Due for follow up with cardiology  - CBC WITH DIFFERENTIAL; Future  - Comp Metabolic Panel; Future  - lisinopril-hydrochlorothiazide (PRINZIDE) 20-12.5 MG per tablet; Take 2 Tablets by mouth every day.  Dispense: 180 Tablet; Refill: 3    5. Dizziness  Stable, intermittent    6. Severe aortic stenosis  Stable, continue follow up with cardiology    7. Congenital insufficiency of aortic valve  Stable, continue follow up with cardiology    8. Screening PSA (prostate specific antigen)  - PROSTATE SPECIFIC AG SCREENING; Future      Other orders  - amoxicillin (AMOXIL) 500 MG Cap; TAKE 4 CAPSULES BY MOUTH 1 HOUR PRIOR TO APPOINTMENT      Services suggested: No services needed at this time  Health Care Screening: Age-appropriate preventive services recommended by USPTF and ACIP covered by Medicare were discussed today. Services ordered if indicated and agreed upon by the patient.  Referrals offered: Community-based lifestyle interventions to reduce health risks and promote self-management and wellness, fall prevention, nutrition, physical activity, tobacco-use cessation, weight loss, and mental health services as per orders if indicated.    Discussion today about general wellness and lifestyle habits:    Prevent falls and reduce trip hazards; Cautioned about securing or removing rugs.  Have a working fire alarm and carbon monoxide detector;   Engage in regular physical activity and social activities     Follow-up: Return in about 1 year (around 4/24/2024) for Annual.

## 2023-05-01 ENCOUNTER — HOSPITAL ENCOUNTER (OUTPATIENT)
Dept: LAB | Facility: MEDICAL CENTER | Age: 68
End: 2023-05-01
Attending: NURSE PRACTITIONER
Payer: MEDICARE

## 2023-05-01 ENCOUNTER — HOSPITAL ENCOUNTER (OUTPATIENT)
Dept: LAB | Facility: MEDICAL CENTER | Age: 68
End: 2023-05-01
Attending: INTERNAL MEDICINE
Payer: MEDICARE

## 2023-05-01 DIAGNOSIS — I10 ESSENTIAL HYPERTENSION: ICD-10-CM

## 2023-05-01 DIAGNOSIS — R73.03 PREDIABETES: ICD-10-CM

## 2023-05-01 DIAGNOSIS — Z12.5 SCREENING PSA (PROSTATE SPECIFIC ANTIGEN): ICD-10-CM

## 2023-05-01 LAB
ALBUMIN SERPL BCP-MCNC: 4.3 G/DL (ref 3.2–4.9)
ALBUMIN/GLOB SERPL: 2 G/DL
ALP SERPL-CCNC: 46 U/L (ref 30–99)
ALT SERPL-CCNC: 22 U/L (ref 2–50)
ANION GAP SERPL CALC-SCNC: 12 MMOL/L (ref 7–16)
AST SERPL-CCNC: 26 U/L (ref 12–45)
BASOPHILS # BLD AUTO: 0.9 % (ref 0–1.8)
BASOPHILS # BLD: 0.06 K/UL (ref 0–0.12)
BILIRUB SERPL-MCNC: 0.6 MG/DL (ref 0.1–1.5)
BUN SERPL-MCNC: 19 MG/DL (ref 8–22)
CALCIUM ALBUM COR SERPL-MCNC: 9 MG/DL (ref 8.5–10.5)
CALCIUM SERPL-MCNC: 9.2 MG/DL (ref 8.4–10.2)
CHLORIDE SERPL-SCNC: 103 MMOL/L (ref 96–112)
CO2 SERPL-SCNC: 23 MMOL/L (ref 20–33)
CREAT SERPL-MCNC: 1.05 MG/DL (ref 0.5–1.4)
EOSINOPHIL # BLD AUTO: 0.07 K/UL (ref 0–0.51)
EOSINOPHIL NFR BLD: 1 % (ref 0–6.9)
ERYTHROCYTE [DISTWIDTH] IN BLOOD BY AUTOMATED COUNT: 40.6 FL (ref 35.9–50)
EST. AVERAGE GLUCOSE BLD GHB EST-MCNC: 103 MG/DL
GFR SERPLBLD CREATININE-BSD FMLA CKD-EPI: 78 ML/MIN/1.73 M 2
GLOBULIN SER CALC-MCNC: 2.2 G/DL (ref 1.9–3.5)
GLUCOSE SERPL-MCNC: 134 MG/DL (ref 65–99)
HBA1C MFR BLD: 5.2 % (ref 4–5.6)
HCT VFR BLD AUTO: 45.3 % (ref 42–52)
HGB BLD-MCNC: 15.3 G/DL (ref 14–18)
IMM GRANULOCYTES # BLD AUTO: 0.03 K/UL (ref 0–0.11)
IMM GRANULOCYTES NFR BLD AUTO: 0.4 % (ref 0–0.9)
LYMPHOCYTES # BLD AUTO: 2.04 K/UL (ref 1–4.8)
LYMPHOCYTES NFR BLD: 29.5 % (ref 22–41)
MAGNESIUM SERPL-MCNC: 2 MG/DL (ref 1.5–2.5)
MCH RBC QN AUTO: 30.7 PG (ref 27–33)
MCHC RBC AUTO-ENTMCNC: 33.8 G/DL (ref 33.7–35.3)
MCV RBC AUTO: 91 FL (ref 81.4–97.8)
MONOCYTES # BLD AUTO: 0.6 K/UL (ref 0–0.85)
MONOCYTES NFR BLD AUTO: 8.7 % (ref 0–13.4)
NEUTROPHILS # BLD AUTO: 4.12 K/UL (ref 1.82–7.42)
NEUTROPHILS NFR BLD: 59.5 % (ref 44–72)
NRBC # BLD AUTO: 0 K/UL
NRBC BLD-RTO: 0 /100 WBC
PLATELET # BLD AUTO: 240 K/UL (ref 164–446)
PMV BLD AUTO: 10.9 FL (ref 9–12.9)
POTASSIUM SERPL-SCNC: 4.2 MMOL/L (ref 3.6–5.5)
PROT SERPL-MCNC: 6.5 G/DL (ref 6–8.2)
PSA SERPL-MCNC: 0.78 NG/ML (ref 0–4)
RBC # BLD AUTO: 4.98 M/UL (ref 4.7–6.1)
SODIUM SERPL-SCNC: 138 MMOL/L (ref 135–145)
WBC # BLD AUTO: 6.9 K/UL (ref 4.8–10.8)

## 2023-05-01 PROCEDURE — 83735 ASSAY OF MAGNESIUM: CPT

## 2023-05-01 PROCEDURE — 36415 COLL VENOUS BLD VENIPUNCTURE: CPT

## 2023-05-01 PROCEDURE — 80053 COMPREHEN METABOLIC PANEL: CPT

## 2023-05-01 PROCEDURE — 85025 COMPLETE CBC W/AUTO DIFF WBC: CPT

## 2023-05-01 PROCEDURE — 83036 HEMOGLOBIN GLYCOSYLATED A1C: CPT | Mod: GA

## 2023-05-01 PROCEDURE — 84153 ASSAY OF PSA TOTAL: CPT | Mod: GA

## 2023-11-02 ENCOUNTER — TELEPHONE (OUTPATIENT)
Dept: HEALTH INFORMATION MANAGEMENT | Facility: OTHER | Age: 68
End: 2023-11-02
Payer: MEDICARE

## 2023-11-02 DIAGNOSIS — Z12.11 SCREEN FOR COLON CANCER: ICD-10-CM

## 2023-11-02 NOTE — TELEPHONE ENCOUNTER
1. Caller Name: Lionel Epperson                          Call Back Number: [phone] (home)         How would the patient prefer to be contacted with a response: Symformhart message    2. SPECIFIC Action To Be Taken: Referral pending, please sign.    3. Diagnosis/Clinical Reason for Request:   Colorectal Cancer Screening    4. Specialty & Provider Name/Lab/Imaging Location: PCP's GI recommendation     5. Is appointment scheduled for requested order/referral: no    Patient was not informed they will receive a return phone call from the office ONLY if there are any questions before processing their request. Advised to call back if they haven't received a call from the referral department in 5 days.

## 2023-11-06 ENCOUNTER — OFFICE VISIT (OUTPATIENT)
Dept: CARDIOLOGY | Facility: MEDICAL CENTER | Age: 68
End: 2023-11-06
Attending: INTERNAL MEDICINE
Payer: MEDICARE

## 2023-11-06 VITALS
OXYGEN SATURATION: 97 % | HEIGHT: 73 IN | BODY MASS INDEX: 24.78 KG/M2 | HEART RATE: 66 BPM | WEIGHT: 187 LBS | SYSTOLIC BLOOD PRESSURE: 122 MMHG | RESPIRATION RATE: 18 BRPM | DIASTOLIC BLOOD PRESSURE: 78 MMHG

## 2023-11-06 DIAGNOSIS — I35.0 SEVERE AORTIC STENOSIS: Chronic | ICD-10-CM

## 2023-11-06 DIAGNOSIS — I10 ESSENTIAL HYPERTENSION: ICD-10-CM

## 2023-11-06 DIAGNOSIS — Q23.1 BICUSPID AORTIC VALVE: ICD-10-CM

## 2023-11-06 DIAGNOSIS — E78.6 LOW HDL (UNDER 40): ICD-10-CM

## 2023-11-06 PROCEDURE — 99212 OFFICE O/P EST SF 10 MIN: CPT | Performed by: INTERNAL MEDICINE

## 2023-11-06 PROCEDURE — 99214 OFFICE O/P EST MOD 30 MIN: CPT | Performed by: INTERNAL MEDICINE

## 2023-11-06 PROCEDURE — 3078F DIAST BP <80 MM HG: CPT | Performed by: INTERNAL MEDICINE

## 2023-11-06 PROCEDURE — 3074F SYST BP LT 130 MM HG: CPT | Performed by: INTERNAL MEDICINE

## 2023-11-06 ASSESSMENT — FIBROSIS 4 INDEX: FIB4 SCORE: 1.52

## 2023-11-06 NOTE — PATIENT INSTRUCTIONS
Work on at least 1.5 - 5 hours a week of moderate exercise (typical brisk walking or similar activity)    If you have had a heart attack, stent, bypass or reduced heart strength (EF <35%): cardiac rehab may reduce your risk of dying by 13-24% and need to go to the hospital by 30% within the first year (1)    Please look into the following diets and incorporate them into your diet    LOW SALT DIET   KEEP YOUR SODIUM EQUAL TO CALORIES AND NO MORE THAN DOUBLE THE CALORIES FOR A LOW SALT DIET    Cardiosmart.org - great resource for American College of Cardiology on heart disease prevention and treatment    FOR TREATMENT OR PREVENTION OF CORONARY ARTERY DISEASE  These three programs are approved by Medicare/Insurers for those with heart disease  [facility]  Dr. Levine's Program for Reversing Heart Disease - Alex Moraes's Cardiologist vegetarian-based  [facility] - [place]-based mind-body Program    Mediterranean Diet has been shown to be a hearty healthy diet.    This is a commonly referenced Program  Dr Cerda - Meghann over [place] (book and documentary) - vegetarian-based    FOR TREATMENT OF BLOOD PRESSURE  DASH DIET - American Heart Association for treatment of HYPERTENSION    FOR TREATMENT OF BAD CHOLESTEROL/FATS  REDUCE PROCESSED SUGAR AS MUCH AS POSSIBLE  INCREASE WHOLE GRAINS/VEGETABLES  INCREASE FIBER    Lowering total cholesterol and LDL (bad) cholesterol:  - Eat leaner cuts of meat, or eliminate altogether if possible red meat, and frequently substitute fish or chicken.  - Limit saturated fat to no more than 7-10% of total calories no more than 10 g per day is recommended. Some sources of saturated fat include butter, animal fats, hydrogenated vegetable fats and oils, many desserts, whole milk dairy products.  - Replaced saturated fats with polyunsaturated fats and monounsaturated fats. Foods high in monounsaturated fat include nuts, canola  oil, avocados, and olives.  - Limit trans fat (processed foods) and replaced with fresh fruits and vegetables  - Recommend nonfat dairy products  - Increase substantially the amount of soluble fiber intake (legumes such as beans, fruit, whole grains).  - Consider nutritional supplements: plant sterile spreads such as Benecol, fish oil,  flaxseed oil, omega-3 acids capsules 1000 mg twice a day, or viscous fiber such as Metamucil  - Attain ideal weight and regular exercise (at least 30 minutes per day of moderate exercise)  ASK ABOUT STATIN OR NON STATIN MEDICATION TO REDUCE YOUR LDL AND HEART RISK    Lowering triglycerides:  - Reduce intake of simple sugar: Desserts, candy, pastries, honey, sodas, sugared cereals, yogurt, Gatorade, sports bars, canned fruit, smoothies, fruit juice, coffee drinks  - Reduced intake of refined starches: Refined Pasta, most bread  - Reduce or abstain from alcohol  - Increase omega-3 fatty acids: [place], Trout, Mackerel, Herring, [place] tuna and supplements  - Attain ideal weight and regular exercise (at least 30 minutes per day of moderate exercise)  ASK ABOUT PURIFIED OMEGA 3 EPA or FISH OIL TO REDUCE YOUR TG AND HEART RISK    Elevating HDL (good) cholesterol:  - Increase physical activity  - Increase omega-3 fatty acids and supplements as listed above  - Incorporating appropriate amounts of monounsaturated fats such as nuts, olive oil, canola oil, avocados, olives  - Stop smoking  - Attain ideal weight and regular exercise (at least 30 minutes per day of moderate exercise)

## 2023-11-06 NOTE — PROGRESS NOTES
Chief Complaint   Patient presents with    Other     F/V Dx: Bicuspid aortic valve       Subjective     Michael Jimmy Epperson is a 68 y.o. male who presents today for follow-up of his history of bicuspid aortic valve with severe stenosis and regurgitation and dyslipidemia    BP meds increased but had more diuretic effect    HDL has been better    Past Medical History:   Diagnosis Date    Bicuspid aortic valve 10/14/2020    Essential hypertension 10/14/2020    Pure hypercholesterolemia 10/14/2020    Severe aortic stenosis      History reviewed. No pertinent surgical history.  Family History   Problem Relation Age of Onset    Heart Disease Mother     Heart Attack Mother 88    Heart Disease Father 65        CABG     Social History     Socioeconomic History    Marital status:      Spouse name: Not on file    Number of children: Not on file    Years of education: Not on file    Highest education level: Master's degree (e.g., MA, MS, Lan, MEd, MSW, JAVIER)   Occupational History    Not on file   Tobacco Use    Smoking status: Never    Smokeless tobacco: Never   Vaping Use    Vaping Use: Never used   Substance and Sexual Activity    Alcohol use: Yes     Alcohol/week: 1.2 oz     Types: 2 Glasses of wine per week    Drug use: Not Currently    Sexual activity: Yes     Partners: Female     Birth control/protection: Male Sterilization   Other Topics Concern    Not on file   Social History Narrative    Not on file     Social Determinants of Health     Financial Resource Strain: Low Risk  (4/19/2021)    Overall Financial Resource Strain (CARDIA)     Difficulty of Paying Living Expenses: Not hard at all   Food Insecurity: No Food Insecurity (4/21/2023)    Hunger Vital Sign     Worried About Running Out of Food in the Last Year: Never true     Ran Out of Food in the Last Year: Never true   Transportation Needs: No Transportation Needs (4/21/2023)    PRAPARE - Transportation     Lack of Transportation (Medical): No     Lack of  "Transportation (Non-Medical): No   Physical Activity: Sufficiently Active (4/21/2023)    Exercise Vital Sign     Days of Exercise per Week: 5 days     Minutes of Exercise per Session: 90 min   Stress: No Stress Concern Present (4/21/2023)    [employer] [place] [employer] - Occupational Stress Questionnaire     Feeling of Stress : Not at all   Social Connections: Unknown (4/21/2023)    Social Connection and Isolation Panel [NHANES]     Frequency of Communication with Friends and Family: Three times a week     Frequency of Social Gatherings with Friends and Family: Three times a week     Attends Baptism Services: Patient refused     Active Member of Clubs or Organizations: Yes     Attends Club or Organization Meetings: More than 4 times per year     Marital Status:    Intimate Partner Violence: Not on file   Housing Stability: Unknown (4/21/2023)    Housing Stability Vital Sign     Unable to Pay for Housing in the Last Year: Not on file     Number of Places Lived in the Last Year: 1     Unstable Housing in the Last Year: No     No Known Allergies  Outpatient Encounter Medications as of 11/6/2023   Medication Sig Dispense Refill    amoxicillin (AMOXIL) 500 MG Cap TAKE 4 CAPSULES BY MOUTH 1 HOUR PRIOR TO APPOINTMENT      rosuvastatin (CRESTOR) 10 MG Tab Take 1 Tablet by mouth every evening. 90 Tablet 3    lisinopril-hydrochlorothiazide (PRINZIDE) 20-12.5 MG per tablet Take 2 Tablets by mouth every day. (Patient taking differently: Take 1 Tablet by mouth every day.) 180 Tablet 3    ASPIRIN 81 PO Take 81 mg by mouth.      Saw Palmetto-Pumpkin Seed-Zinc 160-40-7.5 MG Cap Take  by mouth.      Cholecalciferol (VITAMIN D3 PO) Take  by mouth every day.       No facility-administered encounter medications on file as of 11/6/2023.     ROS           Objective     /78 (BP Location: Left arm, Patient Position: Sitting, BP Cuff Size: Adult)   Pulse 66   Resp 18   Ht 1.854 m (6' 1\")   Wt 84.8 kg (187 lb) "   SpO2 97%   BMI 24.67 kg/m²     Physical Exam  Constitutional:       General: He is not in acute distress.     Appearance: He is not diaphoretic.   Eyes:      General: No scleral icterus.  Neck:      Vascular: No JVD.   Cardiovascular:      Rate and Rhythm: Normal rate.      Heart sounds: Murmur heard.      No friction rub. No gallop.   Pulmonary:      Effort: No respiratory distress.      Breath sounds: No wheezing or rales.   Abdominal:      General: Bowel sounds are normal.      Palpations: Abdomen is soft.   Musculoskeletal:      Right lower leg: No edema.      Left lower leg: No edema.   Skin:     Findings: No rash.   Neurological:      Mental Status: He is alert. Mental status is at baseline.   Psychiatric:         Mood and Affect: Mood normal.            We reviewed in person the most recent labs  Recent Results (from the past 5040 hour(s))   PROSTATE SPECIFIC AG SCREENING    Collection Time: 05/01/23  8:00 AM   Result Value Ref Range    Prostatic Specific Antigen Tot 0.78 0.00 - 4.00 ng/mL   HEMOGLOBIN A1C    Collection Time: 05/01/23  8:00 AM   Result Value Ref Range    Glycohemoglobin 5.2 4.0 - 5.6 %    Est Avg Glucose 103 mg/dL   Comp Metabolic Panel    Collection Time: 05/01/23  8:00 AM   Result Value Ref Range    Sodium 138 135 - 145 mmol/L    Potassium 4.2 3.6 - 5.5 mmol/L    Chloride 103 96 - 112 mmol/L    Co2 23 20 - 33 mmol/L    Anion Gap 12.0 7.0 - 16.0    Glucose 134 (H) 65 - 99 mg/dL    Bun 19 8 - 22 mg/dL    Creatinine 1.05 0.50 - 1.40 mg/dL    Calcium 9.2 8.4 - 10.2 mg/dL    AST(SGOT) 26 12 - 45 U/L    ALT(SGPT) 22 2 - 50 U/L    Alkaline Phosphatase 46 30 - 99 U/L    Total Bilirubin 0.6 0.1 - 1.5 mg/dL    Albumin 4.3 3.2 - 4.9 g/dL    Total Protein 6.5 6.0 - 8.2 g/dL    Globulin 2.2 1.9 - 3.5 g/dL    A-G Ratio 2.0 g/dL   CBC WITH DIFFERENTIAL    Collection Time: 05/01/23  8:00 AM   Result Value Ref Range    WBC 6.9 4.8 - 10.8 K/uL    RBC 4.98 4.70 - 6.10 M/uL    Hemoglobin 15.3 14.0 - 18.0  g/dL    Hematocrit 45.3 42.0 - 52.0 %    MCV 91.0 81.4 - 97.8 fL    MCH 30.7 27.0 - 33.0 pg    MCHC 33.8 33.7 - 35.3 g/dL    RDW 40.6 35.9 - 50.0 fL    Platelet Count 240 164 - 446 K/uL    MPV 10.9 9.0 - 12.9 fL    Neutrophils-Polys 59.50 44.00 - 72.00 %    Lymphocytes 29.50 22.00 - 41.00 %    Monocytes 8.70 0.00 - 13.40 %    Eosinophils 1.00 0.00 - 6.90 %    Basophils 0.90 0.00 - 1.80 %    Immature Granulocytes 0.40 0.00 - 0.90 %    Nucleated RBC 0.00 /100 WBC    Neutrophils (Absolute) 4.12 1.82 - 7.42 K/uL    Lymphs (Absolute) 2.04 1.00 - 4.80 K/uL    Monos (Absolute) 0.60 0.00 - 0.85 K/uL    Eos (Absolute) 0.07 0.00 - 0.51 K/uL    Baso (Absolute) 0.06 0.00 - 0.12 K/uL    Immature Granulocytes (abs) 0.03 0.00 - 0.11 K/uL    NRBC (Absolute) 0.00 K/uL   CORRECTED CALCIUM    Collection Time: 05/01/23  8:00 AM   Result Value Ref Range    Correct Calcium 9.0 8.5 - 10.5 mg/dL   ESTIMATED GFR    Collection Time: 05/01/23  8:00 AM   Result Value Ref Range    GFR (CKD-EPI) 78 >60 mL/min/1.73 m 2   MAGNESIUM    Collection Time: 05/01/23  8:02 AM   Result Value Ref Range    Magnesium 2.0 1.5 - 2.5 mg/dL         Assessment & Plan     1. Bicuspid aortic valve  EC-ECHOCARDIOGRAM COMPLETE W/O CONT      2. Essential hypertension        3. Low HDL (under 40)        4. Severe aortic stenosis            Medical Decision Making: Today's Assessment/Status/Plan:          It was my pleasure to meet with Mr. Epperson.    We addressed the management of hypertension at today's visit. Blood pressure is well controlled.  We specifically assessed the labs on hypertension treatment    We addressed the management of dyslipidemia and atherosclerosis at today's visit. He is on appropriate statin.      Echo for bicuspid AOv survelinace waiting on newer trials and aortic sizing    I will see Mr. Epperson back in 1-2 year time and encouraged him to follow up with us over the phone or electronically using my MyChart as issues arise.    It is my  pleasure to participate in the care of Mr. Epperson.  Please do not hesitate to contact me with questions or concerns.    Shaggy Roberts MD PhD [facility]  Cardiologist [facility]    Please note that this dictation was created using voice recognition software. There may be errors I did not discover before finalizing the note.                  none

## 2023-12-11 ENCOUNTER — PATIENT MESSAGE (OUTPATIENT)
Dept: CARDIOLOGY | Facility: MEDICAL CENTER | Age: 68
End: 2023-12-11
Payer: MEDICARE

## 2023-12-11 NOTE — PATIENT COMMUNICATION
To CW: Patient states their dentist told them they no longer need amoxicillin prior to dental work. Please advise if there have been any changes or if they should still be taking this prior to dental work. Thank you!

## 2024-02-12 ENCOUNTER — TELEPHONE (OUTPATIENT)
Dept: CARDIOLOGY | Facility: MEDICAL CENTER | Age: 69
End: 2024-02-12
Payer: MEDICARE

## 2024-02-12 ENCOUNTER — PATIENT MESSAGE (OUTPATIENT)
Dept: CARDIOLOGY | Facility: MEDICAL CENTER | Age: 69
End: 2024-02-12
Payer: MEDICARE

## 2024-02-12 NOTE — TELEPHONE ENCOUNTER
CW      Caller: Griselda @ [facility]     Fax Number:  [phone]    Procedure Name: Colonoscopy    Procedure Scheduled Date: 2/16/24    Callback Number: [phone]  -   Clearance was refaxed on 2/12 @ 3:54 pm to [phone]    Thank You   Peg LEIVA

## 2024-02-13 ENCOUNTER — TELEPHONE (OUTPATIENT)
Dept: CARDIOLOGY | Facility: MEDICAL CENTER | Age: 69
End: 2024-02-13
Payer: MEDICARE

## 2024-02-14 ENCOUNTER — PATIENT MESSAGE (OUTPATIENT)
Dept: CARDIOLOGY | Facility: MEDICAL CENTER | Age: 69
End: 2024-02-14
Payer: MEDICARE

## 2024-02-14 NOTE — PATIENT COMMUNICATION
Reviewed telephone note created by JAMESON Panda today.    Replied to pt via [employer] regarding findings.

## 2024-02-14 NOTE — TELEPHONE ENCOUNTER
TIMOTHY  Caller: Griselda - [facility]     Topic/issue: [facility] have not yet received patients clearance , please see enc 2/13/24. They ask that this be re faxed as patient is scheduled for a procedure today .    Fax: [phone]     Callback Number: [phone]    Thank you  Kristina CONNOLLY

## 2024-02-15 NOTE — PATIENT COMMUNICATION
Letter drafted with CW recommendations and electronically faxed to [facility], [phone] and to pt via [employer].    Replied to pt via [email or web address] regarding findings.

## 2024-02-15 NOTE — PATIENT COMMUNICATION
To CW, please advise.  Echo scheduled for 11/4/2024.  Baby Aspirin listed on MAR but we never ordered this med.  Per prompts below, please advise on clearance request.  Thank you    ==================================    Last OV: 11/6/2023  Proposed Surgery: colonoscopy  Surgery Date: 2/16/2024  Requesting Office Name: [facility]  Fax Number: [phone]  Preference of Location (default is surgery center unless specified by Cardiologist or [facility])  Prior Clearance Addressed: No      Anticoags/Antiplatelets: Aspirin  Anticoags/Antiplatelet managed by Cardiology? No Provider to advise.    Outstanding Cardiac Imaging : Yes  Echo.   Clearance to provider to review  Stent, Cardiac Devices, or Catheterization: no  Ablation, TAVR/Valve (including open heart), Cardioversion: No  Recent Cardiac Hospitalization: No            When: Greater than 3 months since hospitalization   History (cardiac history):   Past Medical History:   Diagnosis Date    Bicuspid aortic valve 10/14/2020    Essential hypertension 10/14/2020    Pure hypercholesterolemia 10/14/2020    Severe aortic stenosis              Surgical Clearance Letter Sent: No Provider to advise.   **Scan clearance request letter into [facility].**

## 2024-02-15 NOTE — PROGRESS NOTES
He can proceed with the proposed procedure or surgery from a cardiac standpoint, no modifiable cardiovascular risk, no further cardiac testing required, hold antiplatelet as necessary, resume as soon as possible typically when patient is able to take oral medications.    It is my pleasure to participate in the care of Mr. Epperson.  Please do not hesitate to contact me with questions or concerns. [facility] is available 24/7 for consultative services at [phone] in the perioperative period.    Electronically Signed    Shaggy Roberts MD PhD FACC  Cardiologist [facility]

## 2024-02-17 DIAGNOSIS — E78.00 PURE HYPERCHOLESTEROLEMIA: ICD-10-CM

## 2024-02-20 RX ORDER — ROSUVASTATIN CALCIUM 10 MG/1
10 TABLET, COATED ORAL EVERY EVENING
Qty: 90 TABLET | Refills: 0 | Status: SHIPPED | OUTPATIENT
Start: 2024-02-20

## 2024-02-20 NOTE — TELEPHONE ENCOUNTER
Received request via: Patient    Was the patient seen in the last year in this department? Yes    Does the patient have an active prescription (recently filled or refills available) for medication(s) requested? No    [facility]    Does the patient have USP Plus and need 100 day supply (blood pressure, diabetes and cholesterol meds only)? Patient does not have SCP

## 2024-05-08 ENCOUNTER — OFFICE VISIT (OUTPATIENT)
Dept: MEDICAL GROUP | Facility: MEDICAL CENTER | Age: 69
End: 2024-05-08
Payer: MEDICARE

## 2024-05-08 VITALS
TEMPERATURE: 97 F | HEIGHT: 73 IN | SYSTOLIC BLOOD PRESSURE: 92 MMHG | BODY MASS INDEX: 24.78 KG/M2 | DIASTOLIC BLOOD PRESSURE: 50 MMHG | OXYGEN SATURATION: 98 % | HEART RATE: 56 BPM | WEIGHT: 187 LBS

## 2024-05-08 DIAGNOSIS — Z12.5 SCREENING PSA (PROSTATE SPECIFIC ANTIGEN): ICD-10-CM

## 2024-05-08 DIAGNOSIS — I35.0 SEVERE AORTIC STENOSIS: Chronic | ICD-10-CM

## 2024-05-08 DIAGNOSIS — R73.03 PREDIABETES: ICD-10-CM

## 2024-05-08 DIAGNOSIS — Q23.1 CONGENITAL INSUFFICIENCY OF AORTIC VALVE: ICD-10-CM

## 2024-05-08 DIAGNOSIS — E78.00 PURE HYPERCHOLESTEROLEMIA: ICD-10-CM

## 2024-05-08 DIAGNOSIS — Q23.1 BICUSPID AORTIC VALVE: ICD-10-CM

## 2024-05-08 DIAGNOSIS — E78.6 LOW HDL (UNDER 40): ICD-10-CM

## 2024-05-08 DIAGNOSIS — H93.13 TINNITUS OF BOTH EARS: ICD-10-CM

## 2024-05-08 DIAGNOSIS — Z00.00 MEDICARE ANNUAL WELLNESS VISIT, INITIAL: ICD-10-CM

## 2024-05-08 DIAGNOSIS — I10 ESSENTIAL HYPERTENSION: ICD-10-CM

## 2024-05-08 PROCEDURE — G0439 PPPS, SUBSEQ VISIT: HCPCS | Performed by: NURSE PRACTITIONER

## 2024-05-08 PROCEDURE — 3074F SYST BP LT 130 MM HG: CPT | Performed by: NURSE PRACTITIONER

## 2024-05-08 PROCEDURE — 3078F DIAST BP <80 MM HG: CPT | Performed by: NURSE PRACTITIONER

## 2024-05-08 RX ORDER — LISINOPRIL AND HYDROCHLOROTHIAZIDE 20; 12.5 MG/1; MG/1
1 TABLET ORAL DAILY
Qty: 90 TABLET | Refills: 3 | Status: SHIPPED | OUTPATIENT
Start: 2024-05-08 | End: 2024-05-08

## 2024-05-08 RX ORDER — ROSUVASTATIN CALCIUM 10 MG/1
10 TABLET, COATED ORAL EVERY EVENING
Qty: 90 TABLET | Refills: 0 | Status: SHIPPED | OUTPATIENT
Start: 2024-05-08

## 2024-05-08 RX ORDER — LISINOPRIL 20 MG/1
20 TABLET ORAL DAILY
Qty: 90 TABLET | Refills: 1 | Status: SHIPPED | OUTPATIENT
Start: 2024-05-08

## 2024-05-08 ASSESSMENT — FIBROSIS 4 INDEX: FIB4 SCORE: 1.55

## 2024-05-08 ASSESSMENT — ENCOUNTER SYMPTOMS: GENERAL WELL-BEING: EXCELLENT

## 2024-05-08 ASSESSMENT — ACTIVITIES OF DAILY LIVING (ADL): BATHING_REQUIRES_ASSISTANCE: 0

## 2024-05-08 ASSESSMENT — PATIENT HEALTH QUESTIONNAIRE - PHQ9: CLINICAL INTERPRETATION OF PHQ2 SCORE: 0

## 2024-05-08 NOTE — PROGRESS NOTES
Chief Complaint   Patient presents with    Annual Exam       HPI:  Lionel Epperson is a 69 y.o. here for Medicare Annual Wellness Visit     Patient Active Problem List    Diagnosis Date Noted    Prediabetes 04/25/2022    Severe aortic stenosis     Bicuspid aortic valve 10/14/2020    Essential hypertension 10/14/2020    Pure hypercholesterolemia 10/14/2020    Healthcare maintenance 10/14/2020    Dizziness 10/14/2020    Low HDL (under 40) 06/18/2015    Congenital insufficiency of aortic valve 09/27/2003       Current Outpatient Medications   Medication Sig Dispense Refill    rosuvastatin (CRESTOR) 10 MG Tab Take 1 Tablet by mouth every evening. 90 Tablet 0    amoxicillin (AMOXIL) 500 MG Cap TAKE 4 CAPSULES BY MOUTH 1 HOUR PRIOR TO APPOINTMENT      lisinopril-hydrochlorothiazide (PRINZIDE) 20-12.5 MG per tablet Take 2 Tablets by mouth every day. (Patient taking differently: Take 1 Tablet by mouth every day.) 180 Tablet 3    ASPIRIN 81 PO Take 81 mg by mouth.      Saw Palmetto-Pumpkin Seed-Zinc 160-40-7.5 MG Cap Take  by mouth.      Cholecalciferol (VITAMIN D3 PO) Take  by mouth every day.       No current facility-administered medications for this visit.          Current supplements as per medication list.     Allergies: Patient has no known allergies.    Current social contact/activities:      He  reports that he has never smoked. He has never used smokeless tobacco. He reports current alcohol use of about 1.2 oz of alcohol per week. He reports that he does not currently use drugs.  Counseling given: Not Answered      ROS:    Gait: Uses no assistive device  Ostomy: No  Other tubes: No  Amputations: No  Chronic oxygen use: No  Last eye exam: 10 months ago  Wears hearing aids: No   : Denies any urinary leakage during the last 6 months    Screening:    Depression Screening  Little interest or pleasure in doing things?  0 - not at all  Feeling down, depressed , or hopeless? 0 - not at all  Patient Health  Questionnaire Score: 0     If depressive symptoms identified deferred to follow up visit unless specifically addressed in assessment and plan.    Interpretation of PHQ-9 Total Score   Score Severity   1-4 No Depression   5-9 Mild Depression   10-14 Moderate Depression   15-19 Moderately Severe Depression   20-27 Severe Depression    Screening for Cognitive Impairment  Do you or any of your friends or family members have any concern about your memory? No  Three Minute Recall (Leader, Season, Table) 3/3    Lance clock face with all 12 numbers and set the hands to show 10 minutes after 11.  Yes    Cognitive concerns identified deferred for follow up unless specifically addressed in assessment and plan.    Fall Risk Assessment  Has the patient had two or more falls in the last year or any fall with injury in the last year?  No    Safety Assessment  Do you always wear your seatbelt?  Yes  Any changes to home needed to function safely? No  Difficulty hearing.  No  Patient counseled about all safety risks that were identified.    Functional Assessment ADLs  Are there any barriers preventing you from cooking for yourself or meeting nutritional needs?  No.    Are there any barriers preventing you from driving safely or obtaining transportation?  No.    Are there any barriers preventing you from using a telephone or calling for help?  No    Are there any barriers preventing you from shopping?  No.    Are there any barriers preventing you from taking care of your own finances?  No    Are there any barriers preventing you from managing your medications?  No    Are there any barriers preventing you from showering, bathing or dressing yourself? No    Are there any barriers preventing you from doing housework or laundry? No  Are there any barriers preventing you from using the toilet?No  Are you currently engaging in any exercise or physical activity?  Yes.      Self-Assessment of Health  What is your perception of your health?  Excellent  Do you sleep more than six hours a night? Yes  In the past 7 days, how much did pain keep you from doing your normal work? None  Do you spend quality time with family or friends (virtually or in person)? Yes  Do you usually eat a heart healthy diet that constists of a variety of fruits, vegetables, whole grains and fiber? Yes  Do you eat foods high in fat and/or Fast Food more than three times per week? No    Advance Care Planning  Do you have an Advance Directive, Living Will, Durable Power of , or POLST? Yes    Living Will Durable Power of    is on file      Health Maintenance Summary            Overdue - COVID-19 Vaccine (5 - 2023-24 season) Overdue since 9/1/2023 09/27/2022  Outside Immunization: COVID mRNA Bivalent(MOD)    03/31/2022  Imm Admin: MODERNA SARS-COV-2 VACCINE (12+)    10/26/2021  Imm Admin: MODERNA SARS-COV-2 VACCINE (12+)    04/17/2021  Imm Admin: MODERNA SARS-COV-2 VACCINE (12+)    03/19/2021  Imm Admin: MODERNA SARS-COV-2 VACCINE (12+)    Only the first 5 history entries have been loaded, but more history exists.              Overdue - Annual Wellness Visit (Yearly) Overdue since 4/24/2024 04/24/2023  Visit Dx: Medicare annual wellness visit, subsequent              Colorectal Cancer Screening (Colonoscopy - Every 5 Years) Tentatively due on 3/9/2029      03/09/2024  AMB EXTERNAL COLONOSCOPY RESULTS    02/16/2024  AMB EXTERNAL COLONOSCOPY RESULTS    11/30/2018  Colonoscopy (Done)              IMM DTaP/Tdap/Td Vaccine (3 - Td or Tdap) Next due on 3/19/2029      03/19/2019  Imm Admin: TD Vaccine    10/03/2008  Imm Admin: Tdap Vaccine    01/01/1995  Imm Admin: dT Vaccine - HISTORICAL DATA              Hepatitis C Screening  Tentatively Complete      04/13/2021  Hepatitis C Antibody component of HEP C VIRUS ANTIBODY              Zoster (Shingles) Vaccines (Series Information) Completed      04/19/2022  Imm Admin: Zoster Vaccine Recombinant (RZV) (SHINGRIX)     10/04/2021  Imm Admin: Zoster Vaccine Recombinant (RZV) (SHINGRIX)    11/01/2012  Imm Admin: Zoster Vaccine Live (ZVL) (Zostavax) - HISTORICAL DATA              Pneumococcal Vaccine: 65+ Years (Series Information) Completed      04/19/2022  Imm Admin: Pneumococcal Conjugate Vaccine (PCV20)    03/19/2019  Imm Admin: Pneumococcal polysaccharide vaccine (PPSV-23)              Influenza Vaccine (Series Information) Completed      11/13/2023  Outside Immunization: Influenza Quad Adjuvanted    11/07/2022  Outside Immunization: Fluzone High-Dose Quad    10/04/2021  Imm Admin: Influenza Vaccine Quad Inj (Pf)    10/14/2020  Imm Admin: Influenza Vaccine Quad Inj (Pf)    10/17/2018  Imm Admin: Influenza Vaccine Quad Inj (Preserved)    Only the first 5 history entries have been loaded, but more history exists.              Hepatitis A Vaccine (Hep A) (Series Information) Aged Out      No completion history exists for this topic.              Hepatitis B Vaccine (Hep B) (Series Information) Aged Out      No completion history exists for this topic.              HPV Vaccines (Series Information) Aged Out      No completion history exists for this topic.              Polio Vaccine (Inactivated Polio) (Series Information) Aged Out      No completion history exists for this topic.              Meningococcal Immunization (Series Information) Aged Out      No completion history exists for this topic.                    Patient Care Team:  KHALIF George as PCP - General (Family Medicine)        Social History     Tobacco Use    Smoking status: Never    Smokeless tobacco: Never   Vaping Use    Vaping Use: Never used   Substance Use Topics    Alcohol use: Yes     Alcohol/week: 1.2 oz     Types: 2 Glasses of wine per week    Drug use: Not Currently     Family History   Problem Relation Age of Onset    Heart Disease Mother     Heart Attack Mother 88    Heart Disease Father 65        CABG     He  has a past medical history of  "Bicuspid aortic valve (10/14/2020), Essential hypertension (10/14/2020), Pure hypercholesterolemia (10/14/2020), and Severe aortic stenosis.   History reviewed. No pertinent surgical history.    Exam:   BP 92/50   Pulse (!) 56   Temp 36.1 °C (97 °F) (Temporal)   Ht 1.854 m (6' 1\")   Wt 84.8 kg (187 lb)   SpO2 98%  Body mass index is 24.67 kg/m².    Hearing satisfactory.    Dentition good  Alert, oriented in no acute distress.  Eye contact is good, speech goal directed, affect calm  Constitutional: Alert, no distress.  Skin: Warm, dry, good turgor, no rashes in visible areas.  Eye: Equal, round and reactive, conjunctiva clear, lids normal.  ENMT: Lips without lesions, good dentition, oropharynx clear.  Neck: Trachea midline, no masses, no thyromegaly. No cervical or supraclavicular lymphadenopathy  Respiratory: Unlabored respiratory effort, lungs clear to auscultation, no wheezes, no ronchi.  Cardiovascular: Normal S1, S2, no murmur, no edema.  Psych: Alert and oriented x3, normal affect and mood.      Assessment and Plan. The following treatment and monitoring plan is recommended:      1. Medicare annual wellness visit, initial  Annual labs and health maintenance reviewed and updated.     2. Congenital insufficiency of aortic valve  Stable, continue monitoring and follow up with cardiology    3. Bicuspid aortic valve  Stable, continue monitoring and follow up with cardiology    4. Severe aortic stenosis  Stable, asymptomatic.   Continue monitoring and follow up with cardiology  Has repeat Echo scheduled for November and follow up with cardiology shortly after    5. Essential hypertension  Stable on lisinopril- HCTZ 20-12.5 mg daily however having frequent night time urination, 3x per night. Will try lisinopril 20 mg alone, he will continue to monitor at home  - CBC WITH DIFFERENTIAL; Future  - Comp Metabolic Panel; Future  - MICROALBUMIN CREAT RATIO URINE; Future  - TSH WITH REFLEX TO FT4; Future  - " lisinopril-hydrochlorothiazide (PRINZIDE) 20-12.5 MG per tablet; Take 1 Tablet by mouth every day.  Dispense: 90 Tablet; Refill: 3    6. Prediabetes  Stable, previously resolved with diet changes, due for repeat  - HEMOGLOBIN A1C; Future    7. Pure hypercholesterolemia  Stable on rosuvastatin 10 mg daily  - Lipid Profile; Future  - rosuvastatin (CRESTOR) 10 MG Tab; Take 1 Tablet by mouth every evening.  Dispense: 90 Tablet; Refill: 0    8. Low HDL (under 40)  Stable, last HDL 41    9. Screening PSA (prostate specific antigen)  - PROSTATE SPECIFIC AG SCREENING; Future      Services suggested: No services needed at this time  Health Care Screening: Age-appropriate preventive services recommended by USPTF and ACIP covered by Medicare were discussed today. Services ordered if indicated and agreed upon by the patient.  Referrals offered: Community-based lifestyle interventions to reduce health risks and promote self-management and wellness, fall prevention, nutrition, physical activity, tobacco-use cessation, weight loss, and mental health services as per orders if indicated.    Discussion today about general wellness and lifestyle habits:    Prevent falls and reduce trip hazards; Cautioned about securing or removing rugs.  Have a working fire alarm and carbon monoxide detector;   Engage in regular physical activity and social activities     Follow-up: Return in about 1 year (around 5/8/2025).

## 2024-05-13 ENCOUNTER — HOSPITAL ENCOUNTER (OUTPATIENT)
Dept: LAB | Facility: MEDICAL CENTER | Age: 69
End: 2024-05-13
Attending: NURSE PRACTITIONER
Payer: MEDICARE

## 2024-05-13 DIAGNOSIS — I10 ESSENTIAL HYPERTENSION: ICD-10-CM

## 2024-05-13 DIAGNOSIS — E78.00 PURE HYPERCHOLESTEROLEMIA: ICD-10-CM

## 2024-05-13 DIAGNOSIS — Z12.5 SCREENING PSA (PROSTATE SPECIFIC ANTIGEN): ICD-10-CM

## 2024-05-13 DIAGNOSIS — R73.03 PREDIABETES: ICD-10-CM

## 2024-05-13 LAB
ALBUMIN SERPL BCP-MCNC: 4.4 G/DL (ref 3.2–4.9)
ALBUMIN/GLOB SERPL: 1.6 G/DL
ALP SERPL-CCNC: 49 U/L (ref 30–99)
ALT SERPL-CCNC: 24 U/L (ref 2–50)
ANION GAP SERPL CALC-SCNC: 14 MMOL/L (ref 7–16)
AST SERPL-CCNC: 24 U/L (ref 12–45)
BASOPHILS # BLD AUTO: 0.9 % (ref 0–1.8)
BASOPHILS # BLD: 0.05 K/UL (ref 0–0.12)
BILIRUB SERPL-MCNC: 0.5 MG/DL (ref 0.1–1.5)
BUN SERPL-MCNC: 21 MG/DL (ref 8–22)
CALCIUM ALBUM COR SERPL-MCNC: 9.2 MG/DL (ref 8.5–10.5)
CALCIUM SERPL-MCNC: 9.5 MG/DL (ref 8.4–10.2)
CHLORIDE SERPL-SCNC: 102 MMOL/L (ref 96–112)
CHOLEST SERPL-MCNC: 132 MG/DL (ref 100–199)
CO2 SERPL-SCNC: 21 MMOL/L (ref 20–33)
CREAT SERPL-MCNC: 1.11 MG/DL (ref 0.5–1.4)
CREAT UR-MCNC: 284.57 MG/DL
EOSINOPHIL # BLD AUTO: 0.07 K/UL (ref 0–0.51)
EOSINOPHIL NFR BLD: 1.2 % (ref 0–6.9)
ERYTHROCYTE [DISTWIDTH] IN BLOOD BY AUTOMATED COUNT: 37.9 FL (ref 35.9–50)
EST. AVERAGE GLUCOSE BLD GHB EST-MCNC: 111 MG/DL
FASTING STATUS PATIENT QL REPORTED: NORMAL
GFR SERPLBLD CREATININE-BSD FMLA CKD-EPI: 73 ML/MIN/1.73 M 2
GLOBULIN SER CALC-MCNC: 2.8 G/DL (ref 1.9–3.5)
GLUCOSE SERPL-MCNC: 111 MG/DL (ref 65–99)
HBA1C MFR BLD: 5.5 % (ref 4–5.6)
HCT VFR BLD AUTO: 46.6 % (ref 42–52)
HDLC SERPL-MCNC: 48 MG/DL
HGB BLD-MCNC: 16 G/DL (ref 14–18)
IMM GRANULOCYTES # BLD AUTO: 0.01 K/UL (ref 0–0.11)
IMM GRANULOCYTES NFR BLD AUTO: 0.2 % (ref 0–0.9)
LDLC SERPL CALC-MCNC: 70 MG/DL
LYMPHOCYTES # BLD AUTO: 1.58 K/UL (ref 1–4.8)
LYMPHOCYTES NFR BLD: 27.2 % (ref 22–41)
MCH RBC QN AUTO: 30.7 PG (ref 27–33)
MCHC RBC AUTO-ENTMCNC: 34.3 G/DL (ref 32.3–36.5)
MCV RBC AUTO: 89.3 FL (ref 81.4–97.8)
MICROALBUMIN UR-MCNC: 23.9 MG/DL
MICROALBUMIN/CREAT UR: 84 MG/G (ref 0–30)
MONOCYTES # BLD AUTO: 0.46 K/UL (ref 0–0.85)
MONOCYTES NFR BLD AUTO: 7.9 % (ref 0–13.4)
NEUTROPHILS # BLD AUTO: 3.64 K/UL (ref 1.82–7.42)
NEUTROPHILS NFR BLD: 62.6 % (ref 44–72)
NRBC # BLD AUTO: 0 K/UL
NRBC BLD-RTO: 0 /100 WBC (ref 0–0.2)
PLATELET # BLD AUTO: 216 K/UL (ref 164–446)
PMV BLD AUTO: 11.3 FL (ref 9–12.9)
POTASSIUM SERPL-SCNC: 4.3 MMOL/L (ref 3.6–5.5)
PROT SERPL-MCNC: 7.2 G/DL (ref 6–8.2)
PSA SERPL-MCNC: 1.02 NG/ML (ref 0–4)
RBC # BLD AUTO: 5.22 M/UL (ref 4.7–6.1)
SODIUM SERPL-SCNC: 137 MMOL/L (ref 135–145)
TRIGL SERPL-MCNC: 70 MG/DL (ref 0–149)
TSH SERPL DL<=0.005 MIU/L-ACNC: 2.08 UIU/ML (ref 0.38–5.33)
WBC # BLD AUTO: 5.8 K/UL (ref 4.8–10.8)

## 2024-08-28 DIAGNOSIS — E78.00 PURE HYPERCHOLESTEROLEMIA: ICD-10-CM

## 2024-09-04 DIAGNOSIS — E78.00 PURE HYPERCHOLESTEROLEMIA: ICD-10-CM

## 2024-09-04 RX ORDER — ROSUVASTATIN CALCIUM 10 MG/1
10 TABLET, COATED ORAL EVERY EVENING
Qty: 90 TABLET | Refills: 3 | Status: SHIPPED | OUTPATIENT
Start: 2024-09-04 | End: 2024-09-04 | Stop reason: SDUPTHER

## 2024-09-04 NOTE — TELEPHONE ENCOUNTER
Refill done.  ADDY George.    
Requested Prescriptions     Pending Prescriptions Disp Refills    rosuvastatin (CRESTOR) 10 MG Tab [Pharmacy Med Name: ROSUVASTATIN CALCIUM 10 MG TAB] 90 Tablet 0     Sig: TAKE 1 TABLET BY MOUTH EVERY EVENING      Last office visit: 5/8/24  Last lab: 5/13/24  
PROVIDER:[TOKEN:[[id number]],FOLLOWUP:[1-3 days]]

## 2024-09-06 NOTE — TELEPHONE ENCOUNTER
Received request via: Pharmacy    Was the patient seen in the last year in this department? Yes    Does the patient have an active prescription (recently filled or refills available) for medication(s) requested? No    [facility]     Does the patient have long-term Plus and need 100-day supply? (This applies to ALL medications) Patient does not have SCP

## 2024-09-09 RX ORDER — ROSUVASTATIN CALCIUM 10 MG/1
10 TABLET, COATED ORAL EVERY EVENING
Qty: 90 TABLET | Refills: 3 | Status: SHIPPED | OUTPATIENT
Start: 2024-09-09

## 2024-10-02 ENCOUNTER — TELEMEDICINE (OUTPATIENT)
Dept: MEDICAL GROUP | Facility: MEDICAL CENTER | Age: 69
End: 2024-10-02
Payer: MEDICARE

## 2024-10-02 VITALS
BODY MASS INDEX: 25.58 KG/M2 | DIASTOLIC BLOOD PRESSURE: 81 MMHG | HEIGHT: 73 IN | TEMPERATURE: 97.5 F | WEIGHT: 193 LBS | SYSTOLIC BLOOD PRESSURE: 136 MMHG

## 2024-10-02 DIAGNOSIS — R73.03 PREDIABETES: ICD-10-CM

## 2024-10-02 DIAGNOSIS — R80.9 MICROALBUMINURIA: ICD-10-CM

## 2024-10-02 DIAGNOSIS — E78.00 PURE HYPERCHOLESTEROLEMIA: ICD-10-CM

## 2024-10-02 DIAGNOSIS — Z12.5 SCREENING PSA (PROSTATE SPECIFIC ANTIGEN): ICD-10-CM

## 2024-10-02 DIAGNOSIS — I10 ESSENTIAL HYPERTENSION: ICD-10-CM

## 2024-10-02 PROCEDURE — 99214 OFFICE O/P EST MOD 30 MIN: CPT | Mod: 95 | Performed by: NURSE PRACTITIONER

## 2024-10-02 ASSESSMENT — FIBROSIS 4 INDEX: FIB4 SCORE: 1.52

## 2024-10-24 ENCOUNTER — HOSPITAL ENCOUNTER (OUTPATIENT)
Dept: LAB | Facility: MEDICAL CENTER | Age: 69
End: 2024-10-24
Attending: NURSE PRACTITIONER
Payer: MEDICARE

## 2024-10-24 DIAGNOSIS — I10 ESSENTIAL HYPERTENSION: ICD-10-CM

## 2024-10-24 DIAGNOSIS — R80.9 MICROALBUMINURIA: ICD-10-CM

## 2024-10-24 LAB
CREAT UR-MCNC: 193.51 MG/DL
MICROALBUMIN UR-MCNC: <1.2 MG/DL
MICROALBUMIN/CREAT UR: NORMAL MG/G (ref 0–30)

## 2024-10-24 PROCEDURE — 82570 ASSAY OF URINE CREATININE: CPT

## 2024-10-24 PROCEDURE — 82043 UR ALBUMIN QUANTITATIVE: CPT

## 2024-10-28 ENCOUNTER — HOSPITAL ENCOUNTER (OUTPATIENT)
Dept: CARDIOLOGY | Facility: MEDICAL CENTER | Age: 69
End: 2024-10-28
Attending: INTERNAL MEDICINE
Payer: MEDICARE

## 2024-10-28 DIAGNOSIS — Q23.81 BICUSPID AORTIC VALVE: ICD-10-CM

## 2024-10-28 PROCEDURE — 93306 TTE W/DOPPLER COMPLETE: CPT

## 2024-10-29 ENCOUNTER — TELEPHONE (OUTPATIENT)
Dept: CARDIOLOGY | Facility: MEDICAL CENTER | Age: 69
End: 2024-10-29
Payer: MEDICARE

## 2024-10-29 LAB
LV EJECT FRACT  99904: 60
LV EJECT FRACT MOD 2C 99903: 53.1
LV EJECT FRACT MOD 4C 99902: 57.7
LV EJECT FRACT MOD BP 99901: 56.03

## 2024-10-29 PROCEDURE — 93306 TTE W/DOPPLER COMPLETE: CPT | Mod: 26 | Performed by: INTERNAL MEDICINE

## 2024-10-31 ENCOUNTER — PATIENT MESSAGE (OUTPATIENT)
Dept: CARDIOLOGY | Facility: MEDICAL CENTER | Age: 69
End: 2024-10-31
Payer: MEDICARE

## 2024-11-05 ENCOUNTER — TELEPHONE (OUTPATIENT)
Dept: CARDIOLOGY | Facility: MEDICAL CENTER | Age: 69
End: 2024-11-05
Payer: MEDICARE

## 2024-11-05 ENCOUNTER — OFFICE VISIT (OUTPATIENT)
Dept: CARDIOLOGY | Facility: MEDICAL CENTER | Age: 69
End: 2024-11-05
Attending: INTERNAL MEDICINE
Payer: MEDICARE

## 2024-11-05 VITALS
HEIGHT: 73 IN | HEART RATE: 65 BPM | DIASTOLIC BLOOD PRESSURE: 54 MMHG | BODY MASS INDEX: 24.98 KG/M2 | SYSTOLIC BLOOD PRESSURE: 132 MMHG | WEIGHT: 188.5 LBS | OXYGEN SATURATION: 99 % | RESPIRATION RATE: 14 BRPM

## 2024-11-05 DIAGNOSIS — Q23.81 BICUSPID AORTIC VALVE: ICD-10-CM

## 2024-11-05 DIAGNOSIS — E78.6 LOW HDL (UNDER 40): ICD-10-CM

## 2024-11-05 DIAGNOSIS — I71.21 ANEURYSM OF ASCENDING AORTA WITHOUT RUPTURE (HCC): ICD-10-CM

## 2024-11-05 DIAGNOSIS — I35.0 SEVERE AORTIC STENOSIS: Chronic | ICD-10-CM

## 2024-11-05 DIAGNOSIS — I10 ESSENTIAL HYPERTENSION: ICD-10-CM

## 2024-11-05 PROCEDURE — 3078F DIAST BP <80 MM HG: CPT | Performed by: INTERNAL MEDICINE

## 2024-11-05 PROCEDURE — G2211 COMPLEX E/M VISIT ADD ON: HCPCS | Performed by: INTERNAL MEDICINE

## 2024-11-05 PROCEDURE — 99212 OFFICE O/P EST SF 10 MIN: CPT | Performed by: INTERNAL MEDICINE

## 2024-11-05 PROCEDURE — 3075F SYST BP GE 130 - 139MM HG: CPT | Performed by: INTERNAL MEDICINE

## 2024-11-05 PROCEDURE — 99214 OFFICE O/P EST MOD 30 MIN: CPT | Performed by: INTERNAL MEDICINE

## 2024-11-05 PROCEDURE — 99211 OFF/OP EST MAY X REQ PHY/QHP: CPT | Performed by: INTERNAL MEDICINE

## 2024-11-05 ASSESSMENT — FIBROSIS 4 INDEX: FIB4 SCORE: 1.52

## 2024-11-05 NOTE — PROGRESS NOTES
Chief Complaint   Patient presents with    Other     1 Yr F/V Aortic Stenosis       Subjective     Michael Jimmy Epperson is a 69 y.o. male who presents today for follow-up of his history of bicuspid aortic valve with aortic stenosis we just did an echocardiogram and fortunately has had significant progression since his last echocardiogram which was years ago.  He does have good exercise tolerance but has been found to have critical aortic stenosis as well as thoracic aortic aneurysm at 4.4 cm    He presents today accompanied by his wife    Past Medical History:   Diagnosis Date    Bicuspid aortic valve 10/14/2020    Essential hypertension 10/14/2020    Pure hypercholesterolemia 10/14/2020    Severe aortic stenosis      History reviewed. No pertinent surgical history.  Family History   Problem Relation Age of Onset    Heart Disease Mother     Heart Attack Mother 88    Heart Disease Father 65        CABG     Social History     Socioeconomic History    Marital status:      Spouse name: Not on file    Number of children: Not on file    Years of education: Not on file    Highest education level: Master's degree (e.g., MA, MS, Lan, MEd, MSW, JAVIER)   Occupational History    Not on file   Tobacco Use    Smoking status: Never    Smokeless tobacco: Never   Vaping Use    Vaping status: Never Used   Substance and Sexual Activity    Alcohol use: Yes     Alcohol/week: 1.2 oz     Types: 2 Glasses of wine per week    Drug use: Not Currently    Sexual activity: Yes     Partners: Female     Birth control/protection: Male Sterilization   Other Topics Concern    Not on file   Social History Narrative    Not on file     Social Drivers of Health     Financial Resource Strain: Low Risk  (4/19/2021)    Overall Financial Resource Strain (CARDIA)     Difficulty of Paying Living Expenses: Not hard at all   Food Insecurity: No Food Insecurity (4/21/2023)    Hunger Vital Sign     Worried About Running Out of Food in the Last Year: Never  true     Ran Out of Food in the Last Year: Never true   Transportation Needs: No Transportation Needs (4/21/2023)    PRAPARE - Transportation     Lack of Transportation (Medical): No     Lack of Transportation (Non-Medical): No   Physical Activity: Sufficiently Active (4/21/2023)    Exercise Vital Sign     Days of Exercise per Week: 5 days     Minutes of Exercise per Session: 90 min   Stress: No Stress Concern Present (4/21/2023)    [employer] - Occupational Stress Questionnaire     Feeling of Stress : Not at all   Social Connections: Unknown (4/21/2023)    Social Connection and Isolation Panel [NHANES]     Frequency of Communication with Friends and Family: Three times a week     Frequency of Social Gatherings with Friends and Family: Three times a week     Attends Pentecostal Services: Patient declined     Active Member of Clubs or Organizations: Yes     Attends Club or Organization Meetings: More than 4 times per year     Marital Status:    Intimate Partner Violence: Not on file   Housing Stability: Unknown (4/21/2023)    Housing Stability Vital Sign     Unable to Pay for Housing in the Last Year: Not on file     Number of Places Lived in the Last Year: 1     Unstable Housing in the Last Year: No     No Known Allergies  Outpatient Encounter Medications as of 11/5/2024   Medication Sig Dispense Refill    rosuvastatin (CRESTOR) 10 MG Tab Take 1 Tablet by mouth every evening. 90 Tablet 3    lisinopril (PRINIVIL) 20 MG Tab Take 1 Tablet by mouth every day. 90 Tablet 1    ASPIRIN 81 PO Take 81 mg by mouth. (Patient taking differently: Take 81 mg by mouth. Every other day)      Cholecalciferol (VITAMIN D3 PO) Take  by mouth every day.      amoxicillin (AMOXIL) 500 MG Cap TAKE 4 CAPSULES BY MOUTH 1 HOUR PRIOR TO APPOINTMENT (Patient not taking: Reported on 11/5/2024)       No facility-administered encounter medications on file as of 11/5/2024.     ROS           Objective     /54  "(BP Location: Left arm, Patient Position: Sitting, BP Cuff Size: Adult)   Pulse 65   Resp 14   Ht 1.854 m (6' 1\")   Wt 85.5 kg (188 lb 8 oz)   SpO2 99%   BMI 24.87 kg/m²     Physical Exam  Constitutional:       General: He is not in acute distress.     Appearance: He is not diaphoretic.   Eyes:      General: No scleral icterus.  Neck:      Vascular: No JVD.   Cardiovascular:      Rate and Rhythm: Normal rate.      Heart sounds: Murmur heard.      No friction rub. No gallop.   Pulmonary:      Effort: No respiratory distress.      Breath sounds: No wheezing or rales.   Abdominal:      General: Bowel sounds are normal.      Palpations: Abdomen is soft.   Musculoskeletal:      Right lower leg: No edema.      Left lower leg: No edema.   Skin:     Findings: No rash.   Neurological:      Mental Status: He is alert. Mental status is at baseline.   Psychiatric:         Mood and Affect: Mood normal.            We reviewed in person the most recent labs  Recent Results (from the past 30 weeks)   MICROALBUMIN CREAT RATIO URINE    Collection Time: 05/13/24  8:42 AM   Result Value Ref Range    Creatinine, Urine 284.57 mg/dL    Microalbumin, Urine Random 23.9 mg/dL    Micro Alb Creat Ratio 84 (H) 0 - 30 mg/g   TSH WITH REFLEX TO FT4    Collection Time: 05/13/24  8:43 AM   Result Value Ref Range    TSH 2.080 0.380 - 5.330 uIU/mL   Lipid Profile    Collection Time: 05/13/24  8:43 AM   Result Value Ref Range    Cholesterol,Tot 132 100 - 199 mg/dL    Triglycerides 70 0 - 149 mg/dL    HDL 48 >=40 mg/dL    LDL 70 <100 mg/dL   HEMOGLOBIN A1C    Collection Time: 05/13/24  8:43 AM   Result Value Ref Range    Glycohemoglobin 5.5 4.0 - 5.6 %    Est Avg Glucose 111 mg/dL   PROSTATE SPECIFIC AG SCREENING    Collection Time: 05/13/24  8:43 AM   Result Value Ref Range    Prostatic Specific Antigen Tot 1.02 0.00 - 4.00 ng/mL   Comp Metabolic Panel    Collection Time: 05/13/24  8:43 AM   Result Value Ref Range    Sodium 137 135 - 145 mmol/L "    Potassium 4.3 3.6 - 5.5 mmol/L    Chloride 102 96 - 112 mmol/L    Co2 21 20 - 33 mmol/L    Anion Gap 14.0 7.0 - 16.0    Glucose 111 (H) 65 - 99 mg/dL    Bun 21 8 - 22 mg/dL    Creatinine 1.11 0.50 - 1.40 mg/dL    Calcium 9.5 8.4 - 10.2 mg/dL    Correct Calcium 9.2 8.5 - 10.5 mg/dL    AST(SGOT) 24 12 - 45 U/L    ALT(SGPT) 24 2 - 50 U/L    Alkaline Phosphatase 49 30 - 99 U/L    Total Bilirubin 0.5 0.1 - 1.5 mg/dL    Albumin 4.4 3.2 - 4.9 g/dL    Total Protein 7.2 6.0 - 8.2 g/dL    Globulin 2.8 1.9 - 3.5 g/dL    A-G Ratio 1.6 g/dL   CBC WITH DIFFERENTIAL    Collection Time: 05/13/24  8:43 AM   Result Value Ref Range    WBC 5.8 4.8 - 10.8 K/uL    RBC 5.22 4.70 - 6.10 M/uL    Hemoglobin 16.0 14.0 - 18.0 g/dL    Hematocrit 46.6 42.0 - 52.0 %    MCV 89.3 81.4 - 97.8 fL    MCH 30.7 27.0 - 33.0 pg    MCHC 34.3 32.3 - 36.5 g/dL    RDW 37.9 35.9 - 50.0 fL    Platelet Count 216 164 - 446 K/uL    MPV 11.3 9.0 - 12.9 fL    Neutrophils-Polys 62.60 44.00 - 72.00 %    Lymphocytes 27.20 22.00 - 41.00 %    Monocytes 7.90 0.00 - 13.40 %    Eosinophils 1.20 0.00 - 6.90 %    Basophils 0.90 0.00 - 1.80 %    Immature Granulocytes 0.20 0.00 - 0.90 %    Nucleated RBC 0.00 0.00 - 0.20 /100 WBC    Neutrophils (Absolute) 3.64 1.82 - 7.42 K/uL    Lymphs (Absolute) 1.58 1.00 - 4.80 K/uL    Monos (Absolute) 0.46 0.00 - 0.85 K/uL    Eos (Absolute) 0.07 0.00 - 0.51 K/uL    Baso (Absolute) 0.05 0.00 - 0.12 K/uL    Immature Granulocytes (abs) 0.01 0.00 - 0.11 K/uL    NRBC (Absolute) 0.00 K/uL   FASTING STATUS    Collection Time: 05/13/24  8:43 AM   Result Value Ref Range    Fasting Status Fasting    ESTIMATED GFR    Collection Time: 05/13/24  8:43 AM   Result Value Ref Range    GFR (CKD-EPI) 73 >60 mL/min/1.73 m 2   MICROALBUMIN CREAT RATIO URINE    Collection Time: 10/24/24 11:40 AM   Result Value Ref Range    Creatinine, Urine 193.51 mg/dL    Microalbumin, Urine Random <1.2 mg/dL    Micro Alb Creat Ratio see below 0 - 30 mg/g   EC-ECHOCARDIOGRAM  COMPLETE W/O CONT    Collection Time: 10/28/24  9:47 AM   Result Value Ref Range    Eject.Frac. MOD BP 56.03     Eject.Frac. MOD 4C 57.7     Eject.Frac. MOD 2C 53.1     Left Ventrical Ejection Fraction 60          Assessment & Plan     1. Severe aortic stenosis  Referral to Cardiothoracic Surgery    CL-LEFT HEART CATHETERIZATION WITH POSSIBLE INTERVENTION      2. Bicuspid aortic valve  Referral to Cardiothoracic Surgery    CL-LEFT HEART CATHETERIZATION WITH POSSIBLE INTERVENTION      3. Aneurysm of ascending aorta without rupture (HCC)  Referral to Cardiothoracic Surgery    CL-LEFT HEART CATHETERIZATION WITH POSSIBLE INTERVENTION      4. Low HDL (under 40)        5. Essential hypertension            Medical Decision Making: Today's Assessment/Status/Plan:        It was my pleasure to meet with Mr. Epperson.    We addressed the management of hypertension at today's visit. Blood pressure is well controlled.  We specifically assessed the labs on hypertension treatment    We addressed the management of dyslipidemia at today's visit. He is on appropriate statin.    Will get an angiogram in anticipation of surgical aortic valve replacement and ascending aortic aneurysm repair    I referred him to cardiothoracic surgery to discuss the details of anticipated surgical aortic valve replacement    I will see Mr. Epperson back in 3 months time and encouraged him to follow up with us over the phone or electronically using my MyChart as issues arise.    It is my pleasure to participate in the care of Mr. Epperson.  Please do not hesitate to contact me with questions or concerns.    Shaggy Roberts MD PhD FACC  Cardiologist [facility]    Please note that this dictation was created using voice recognition software. There may be errors I did not discover before finalizing the note.       Mr. Epperson's care is highly complex due to high risk diagnosis with either severe exacerbation, progression,  or side effects of treatment and is at high risk for complication, morbidity, and mortality. We specifically discussed the need for high risk medication requiring at least quarterly testing and/or made a decision on elective/emergent major surgery with identified patient or procedure risk factors specific to Mr. Epperson. I have personally spent extra time in discussion about these facts with Mr. Epperson and reviewed and or ordered at least 3 tests, documents or other physician/BALBIR reports available including labs, imaging and EKGs as appropriate separate from today's encounter.  When relevant, I have reviewed images with Mr. Epperson and personally interpreted on this encounter day the referenced EKG, echocardiogram, stress tests, CT scan, cardiac catheterization or other cardiac imaging and my personal interpretation is what is specifically stated in this note.

## 2024-11-06 DIAGNOSIS — I10 ESSENTIAL HYPERTENSION: ICD-10-CM

## 2024-11-11 NOTE — TELEPHONE ENCOUNTER
Patient is scheduled on 11-19-24 for a C w/poss with Dr. Davila. No meds to stop and patient to check in at 6:30 for an 8:30 procedure. H&P was done on 11-5-24 by Dr. Roberts. Pre admit to call patient.

## 2024-11-12 ENCOUNTER — APPOINTMENT (OUTPATIENT)
Dept: ADMISSIONS | Facility: MEDICAL CENTER | Age: 69
End: 2024-11-12
Attending: INTERNAL MEDICINE
Payer: MEDICARE

## 2024-11-12 ENCOUNTER — TELEPHONE (OUTPATIENT)
Dept: CARDIOTHORACIC SURGERY | Facility: MEDICAL CENTER | Age: 69
End: 2024-11-12
Payer: MEDICARE

## 2024-11-12 RX ORDER — LISINOPRIL 20 MG/1
20 TABLET ORAL DAILY
Qty: 90 TABLET | Refills: 1 | Status: SHIPPED | OUTPATIENT
Start: 2024-11-12

## 2024-11-13 ENCOUNTER — PRE-ADMISSION TESTING (OUTPATIENT)
Dept: ADMISSIONS | Facility: MEDICAL CENTER | Age: 69
End: 2024-11-13
Attending: INTERNAL MEDICINE
Payer: MEDICARE

## 2024-11-13 NOTE — PROGRESS NOTES
REFERRING PHYSICIAN: Shaggy Roberts MD    CONSULTING PHYSICIAN: Harris Mina MD, FACS    CHIEF COMPLAINT: Dizziness    HISTORY OF PRESENT ILLNESS: The patient is a 69 y.o. male with past medical history of hypertension, hyperlipidemia, bicuspid aortic valve, ascending aortic enlargement and aortic stenosis. Today he states he is in his usual state of health. He is very active and exercises daily. He has no exercise limitations. He does have dizziness when lying down but attributes it to his BPV. He denies shortness of breath, chest pain, lower extremity edema, syncope, orthopnea, or PND.    PAST MEDICAL HISTORY:   Active Ambulatory Problems     Diagnosis Date Noted    Bicuspid aortic valve 10/14/2020    Essential hypertension 10/14/2020    Pure hypercholesterolemia 10/14/2020    Healthcare maintenance 10/14/2020    Dizziness 10/14/2020    Severe aortic stenosis     Congenital insufficiency of aortic valve 09/27/2003    Low HDL (under 40) 06/18/2015    Prediabetes 04/25/2022    Tinnitus of both ears 05/08/2024    Microalbuminuria 10/02/2024    Aneurysm of ascending aorta without rupture (HCC) 11/05/2024     Resolved Ambulatory Problems     Diagnosis Date Noted    No Resolved Ambulatory Problems     No Additional Past Medical History     PAST SURGICAL HISTORY: History reviewed. No pertinent surgical history.     ALLERGIES: No Known Allergies     CURRENT MEDICATIONS:   Current Outpatient Medications:     lisinopril (PRINIVIL) 20 MG Tab, TAKE 1 TABLET BY MOUTH EVERY DAY (Patient taking differently: Take 20 mg by mouth every morning.), Disp: 90 Tablet, Rfl: 1    rosuvastatin (CRESTOR) 10 MG Tab, Take 1 Tablet by mouth every evening. (Patient taking differently: Take 10 mg by mouth every morning.), Disp: 90 Tablet, Rfl: 3    amoxicillin (AMOXIL) 500 MG Cap, , Disp: , Rfl:     ASPIRIN 81 PO, Take 81 mg by mouth. (Patient taking differently: Take 81 mg by mouth every morning.), Disp: , Rfl:     Cholecalciferol  (VITAMIN D3 PO), Take  by mouth every day., Disp: , Rfl:     FAMILY HISTORY:   Family History   Problem Relation Age of Onset    Heart Disease Mother     Heart Attack Mother 88    Heart Disease Father 65        CABG      SOCIAL HISTORY:   Social History     Socioeconomic History    Marital status:      Spouse name: Not on file    Number of children: Not on file    Years of education: Not on file    Highest education level: Master's degree (e.g., MA, MS, Lan, MEd, MSW, JAVIER)   Occupational History    Not on file   Tobacco Use    Smoking status: Never    Smokeless tobacco: Never   Vaping Use    Vaping status: Never Used   Substance and Sexual Activity    Alcohol use: Not Currently     Alcohol/week: 1.2 oz     Types: 2 Glasses of wine per week    Drug use: Not Currently    Sexual activity: Yes     Partners: Female     Birth control/protection: Male Sterilization   Other Topics Concern    Not on file   Social History Narrative    Not on file     Social Drivers of Health     Financial Resource Strain: Low Risk  (4/19/2021)    Overall Financial Resource Strain (CARDIA)     Difficulty of Paying Living Expenses: Not hard at all   Food Insecurity: No Food Insecurity (4/21/2023)    Hunger Vital Sign     Worried About Running Out of Food in the Last Year: Never true     Ran Out of Food in the Last Year: Never true   Transportation Needs: No Transportation Needs (4/21/2023)    PRAPARE - Transportation     Lack of Transportation (Medical): No     Lack of Transportation (Non-Medical): No   Physical Activity: Sufficiently Active (4/21/2023)    Exercise Vital Sign     Days of Exercise per Week: 5 days     Minutes of Exercise per Session: 90 min   Stress: No Stress Concern Present (4/21/2023)    [employer] [place] [employer] - Occupational Stress Questionnaire     Feeling of Stress : Not at all   Social Connections: Unknown (4/21/2023)    Social Connection and Isolation Panel [NHANES]     Frequency of Communication  "with Friends and Family: Three times a week     Frequency of Social Gatherings with Friends and Family: Three times a week     Attends Restorationist Services: Patient declined     Active Member of Clubs or Organizations: Yes     Attends Club or Organization Meetings: More than 4 times per year     Marital Status:    Intimate Partner Violence: Not on file   Housing Stability: Unknown (4/21/2023)    Housing Stability Vital Sign     Unable to Pay for Housing in the Last Year: Not on file     Number of Places Lived in the Last Year: 1     Unstable Housing in the Last Year: No     REVIEW OF SYSTEMS:  Review of Systems   Constitutional: Negative.    HENT: Negative.     Eyes:  Positive for blurred vision.   Respiratory: Negative.     Cardiovascular: Negative.    Gastrointestinal: Negative.    Genitourinary:  Positive for frequency.   Musculoskeletal:  Positive for back pain.   Skin: Negative.    Neurological:  Positive for dizziness.   Endo/Heme/Allergies: Negative.    Psychiatric/Behavioral: Negative.       PHYSICAL EXAMINATION:    BP (!) 132/90 (BP Location: Left arm, Patient Position: Sitting, BP Cuff Size: Adult)   Pulse (!) 59   Temp 36.8 °C (98.3 °F) (Temporal)   Ht 1.854 m (6' 1\")   Wt 86.2 kg (190 lb)   SpO2 100%   BMI 25.07 kg/m²      Physical Exam  Constitutional:       General: He is not in acute distress.  HENT:      Head: Normocephalic.   Eyes:      Pupils: Pupils are equal, round, and reactive to light.   Cardiovascular:      Rate and Rhythm: Normal rate and regular rhythm.      Heart sounds: Murmur heard.      Systolic murmur is present with a grade of 3/6.      No gallop.   Pulmonary:      Effort: Pulmonary effort is normal. No respiratory distress.      Breath sounds: Normal breath sounds. No wheezing or rales.   Abdominal:      General: Bowel sounds are normal. There is no distension.      Palpations: Abdomen is soft.      Tenderness: There is no abdominal tenderness.   Musculoskeletal:         " General: Normal range of motion.      Cervical back: Neck supple.   Skin:     General: Skin is warm and dry.   Neurological:      Mental Status: He is alert and oriented to person, place, and time.   Psychiatric:         Mood and Affect: Mood and affect normal.         Cognition and Memory: Memory normal.         Judgment: Judgment normal.     LABS REVIEWED:  Lab Results   Component Value Date/Time    SODIUM 140 11/18/2024 08:16 AM    POTASSIUM 4.4 11/18/2024 08:16 AM    CHLORIDE 107 11/18/2024 08:16 AM    CO2 24 11/18/2024 08:16 AM    GLUCOSE 113 (H) 11/18/2024 08:16 AM    BUN 17 11/18/2024 08:16 AM    CREATININE 0.98 11/18/2024 08:16 AM      Lab Results   Component Value Date/Time    WBC 7.2 11/18/2024 08:16 AM    RBC 5.15 11/18/2024 08:16 AM    HEMOGLOBIN 15.6 11/18/2024 08:16 AM    HEMATOCRIT 46.4 11/18/2024 08:16 AM    MCV 90.1 11/18/2024 08:16 AM    MCH 30.3 11/18/2024 08:16 AM    MCHC 33.6 11/18/2024 08:16 AM    MPV 12.2 11/18/2024 08:16 AM    NEUTSPOLYS 62.60 05/13/2024 08:43 AM    LYMPHOCYTES 27.20 05/13/2024 08:43 AM    MONOCYTES 7.90 05/13/2024 08:43 AM    EOSINOPHILS 1.20 05/13/2024 08:43 AM    BASOPHILS 0.90 05/13/2024 08:43 AM      IMAGING REVIEWED AND INTERPRETED:    ECHOCARDIOGRAM [facility] 10/29/2024:  Normal left ventricular systolic function.  The left ventricular ejection fraction is visually estimated to be 60%.  Grade II diastolic dysfunction.  Normal right ventricular systolic function.  Mild mitral regurgitation.  Severe aortic valve stenosis. Transvalvular gradients are - Peak: 121 mmHg, Mean: 78 mmHg.   Vmax is 5.5 m/s. Aortic valve area calculated from the continuity   equation is 0.6 cm2.  Mild aortic insufficiency.  Mild tricuspid regurgitation.  Estimated right ventricular systolic pressure is 40 mmHg.  The ascending aorta is dilated with a diameter of  4.4 cm.    CARDIAC CATHETERIZATION [facility] 11/19/2024:  Coronary Anatomy              Left Main: Normal              LAD: Minimal luminal  irregularities              LCx: Minimal luminal irregularities              RCA: Dominant, Normal                Supravalvular aortogram:  Enlarged ascending aorta estimated 45 mm    CT SCAN CHEST   None    IMPRESSION:  Severe aortic stenosis, bicuspid aortic valve, ascending aortic aneurysm (4.4 cm), hypertension, dyslipidemia    PLAN:  I recommend that he undergo aortic valve replacement, ascending aortic aneurysm repair and intraoperative transesophageal echocardiography.    The procedure, its risks, benefits, potential complications and alternative treatments were discussed with the patient in detail including the risks should he decide not to undergo my recommended treatment. All of his questions were answered to his satisfaction and he is willing to proceed with the operation. The risks include death, stroke, infection: to include a rare bacterial infection related to the use of the heart/lung machine, chad-operative myocardial infarction, dysrhythmias, diaphragmatic paralysis, chest wall paresthesia, tracheostomy, kidney or other organ failure, possible return to the operating room for bleeding, bleeding requiring transfusion with its attendant risks including AIDS or hepatitis, dehiscence of surgical incisions, respiratory complications including the need for prolonged ventilator support, Protamine or other drug reaction, peripheral neuropathy, loss of limb, and miscount of surgical items. The operative mortality risk is approximately 1%. The STS mortality risk score is 1% and the morbidity and mortality risk score is 10% for AVR. The scores were discussed with patient.    The operation is scheduled for Friday, December 20, 2024 at 7:30 AM at [facility].  A CTA complete will be obtained prior to the operation.    Findings and recommendations have been discussed with the patient’s cardiologist, Shaggy Roberts MD.  Thank you for this very challenging consultation and participation in  the patient’s care.  I will keep you apprised of all future developments.    Sincerely,    Harris Mina MD, FACS

## 2024-11-18 ENCOUNTER — PRE-ADMISSION TESTING (OUTPATIENT)
Dept: ADMISSIONS | Facility: MEDICAL CENTER | Age: 69
End: 2024-11-18
Attending: INTERNAL MEDICINE
Payer: MEDICARE

## 2024-11-18 DIAGNOSIS — Z01.812 PRE-OPERATIVE LABORATORY EXAMINATION: ICD-10-CM

## 2024-11-18 DIAGNOSIS — Z01.810 PRE-OPERATIVE CARDIOVASCULAR EXAMINATION: ICD-10-CM

## 2024-11-18 LAB
ALBUMIN SERPL BCP-MCNC: 4.3 G/DL (ref 3.2–4.9)
ALBUMIN/GLOB SERPL: 1.8 G/DL
ALP SERPL-CCNC: 50 U/L (ref 30–99)
ALT SERPL-CCNC: 20 U/L (ref 2–50)
ANION GAP SERPL CALC-SCNC: 9 MMOL/L (ref 7–16)
APTT PPP: 25.3 SEC (ref 24.7–36)
AST SERPL-CCNC: 26 U/L (ref 12–45)
BILIRUB SERPL-MCNC: 0.5 MG/DL (ref 0.1–1.5)
BUN SERPL-MCNC: 17 MG/DL (ref 8–22)
CALCIUM ALBUM COR SERPL-MCNC: 9.4 MG/DL (ref 8.5–10.5)
CALCIUM SERPL-MCNC: 9.6 MG/DL (ref 8.5–10.5)
CHLORIDE SERPL-SCNC: 107 MMOL/L (ref 96–112)
CO2 SERPL-SCNC: 24 MMOL/L (ref 20–33)
CREAT SERPL-MCNC: 0.98 MG/DL (ref 0.5–1.4)
EKG IMPRESSION: NORMAL
ERYTHROCYTE [DISTWIDTH] IN BLOOD BY AUTOMATED COUNT: 40 FL (ref 35.9–50)
GFR SERPLBLD CREATININE-BSD FMLA CKD-EPI: 84 ML/MIN/1.73 M 2
GLOBULIN SER CALC-MCNC: 2.4 G/DL (ref 1.9–3.5)
GLUCOSE SERPL-MCNC: 113 MG/DL (ref 65–99)
HCT VFR BLD AUTO: 46.4 % (ref 42–52)
HGB BLD-MCNC: 15.6 G/DL (ref 14–18)
INR PPP: 1.07 (ref 0.87–1.13)
MCH RBC QN AUTO: 30.3 PG (ref 27–33)
MCHC RBC AUTO-ENTMCNC: 33.6 G/DL (ref 32.3–36.5)
MCV RBC AUTO: 90.1 FL (ref 81.4–97.8)
PLATELET # BLD AUTO: 203 K/UL (ref 164–446)
PMV BLD AUTO: 12.2 FL (ref 9–12.9)
POTASSIUM SERPL-SCNC: 4.4 MMOL/L (ref 3.6–5.5)
PROT SERPL-MCNC: 6.7 G/DL (ref 6–8.2)
PROTHROMBIN TIME: 13.9 SEC (ref 12–14.6)
RBC # BLD AUTO: 5.15 M/UL (ref 4.7–6.1)
SODIUM SERPL-SCNC: 140 MMOL/L (ref 135–145)
WBC # BLD AUTO: 7.2 K/UL (ref 4.8–10.8)

## 2024-11-18 PROCEDURE — 85730 THROMBOPLASTIN TIME PARTIAL: CPT

## 2024-11-18 PROCEDURE — 93005 ELECTROCARDIOGRAM TRACING: CPT

## 2024-11-18 PROCEDURE — 36415 COLL VENOUS BLD VENIPUNCTURE: CPT

## 2024-11-18 PROCEDURE — 85610 PROTHROMBIN TIME: CPT

## 2024-11-18 PROCEDURE — 93010 ELECTROCARDIOGRAM REPORT: CPT | Performed by: INTERNAL MEDICINE

## 2024-11-18 PROCEDURE — 80053 COMPREHEN METABOLIC PANEL: CPT

## 2024-11-18 PROCEDURE — 85027 COMPLETE CBC AUTOMATED: CPT

## 2024-11-19 ENCOUNTER — APPOINTMENT (OUTPATIENT)
Dept: CARDIOLOGY | Facility: MEDICAL CENTER | Age: 69
End: 2024-11-19
Attending: INTERNAL MEDICINE
Payer: MEDICARE

## 2024-11-19 ENCOUNTER — HOSPITAL ENCOUNTER (OUTPATIENT)
Facility: MEDICAL CENTER | Age: 69
End: 2024-11-19
Attending: INTERNAL MEDICINE | Admitting: INTERNAL MEDICINE
Payer: MEDICARE

## 2024-11-19 VITALS
HEART RATE: 51 BPM | BODY MASS INDEX: 25.83 KG/M2 | TEMPERATURE: 97.2 F | HEIGHT: 73 IN | RESPIRATION RATE: 18 BRPM | WEIGHT: 194.89 LBS | OXYGEN SATURATION: 92 % | SYSTOLIC BLOOD PRESSURE: 146 MMHG | DIASTOLIC BLOOD PRESSURE: 80 MMHG

## 2024-11-19 DIAGNOSIS — I35.0 SEVERE AORTIC STENOSIS: Chronic | ICD-10-CM

## 2024-11-19 DIAGNOSIS — I71.21 ANEURYSM OF ASCENDING AORTA WITHOUT RUPTURE (HCC): ICD-10-CM

## 2024-11-19 DIAGNOSIS — Q23.81 BICUSPID AORTIC VALVE: ICD-10-CM

## 2024-11-19 PROCEDURE — 700111 HCHG RX REV CODE 636 W/ 250 OVERRIDE (IP)

## 2024-11-19 PROCEDURE — 700101 HCHG RX REV CODE 250

## 2024-11-19 PROCEDURE — 99152 MOD SED SAME PHYS/QHP 5/>YRS: CPT | Performed by: INTERNAL MEDICINE

## 2024-11-19 PROCEDURE — 160002 HCHG RECOVERY MINUTES (STAT)

## 2024-11-19 PROCEDURE — 700117 HCHG RX CONTRAST REV CODE 255: Performed by: INTERNAL MEDICINE

## 2024-11-19 PROCEDURE — 93567 NJX CAR CTH SPRVLV AORTGRPHY: CPT | Performed by: INTERNAL MEDICINE

## 2024-11-19 PROCEDURE — 160046 HCHG PACU - 1ST 60 MINS PHASE II

## 2024-11-19 PROCEDURE — 160036 HCHG PACU - EA ADDL 30 MINS PHASE I

## 2024-11-19 PROCEDURE — 99153 MOD SED SAME PHYS/QHP EA: CPT

## 2024-11-19 PROCEDURE — 93454 CORONARY ARTERY ANGIO S&I: CPT | Mod: 26 | Performed by: INTERNAL MEDICINE

## 2024-11-19 PROCEDURE — 160035 HCHG PACU - 1ST 60 MINS PHASE I

## 2024-11-19 RX ORDER — VERAPAMIL HYDROCHLORIDE 2.5 MG/ML
INJECTION, SOLUTION INTRAVENOUS
Status: COMPLETED
Start: 2024-11-19 | End: 2024-11-19

## 2024-11-19 RX ORDER — HEPARIN SODIUM 1000 [USP'U]/ML
INJECTION, SOLUTION INTRAVENOUS; SUBCUTANEOUS
Status: COMPLETED
Start: 2024-11-19 | End: 2024-11-19

## 2024-11-19 RX ORDER — LIDOCAINE HYDROCHLORIDE 20 MG/ML
INJECTION, SOLUTION INFILTRATION; PERINEURAL
Status: COMPLETED
Start: 2024-11-19 | End: 2024-11-19

## 2024-11-19 RX ORDER — HEPARIN SODIUM 200 [USP'U]/100ML
INJECTION, SOLUTION INTRAVENOUS
Status: COMPLETED
Start: 2024-11-19 | End: 2024-11-19

## 2024-11-19 RX ORDER — SODIUM CHLORIDE 9 MG/ML
1.5 INJECTION, SOLUTION INTRAVENOUS CONTINUOUS
Status: DISCONTINUED | OUTPATIENT
Start: 2024-11-19 | End: 2024-11-19 | Stop reason: HOSPADM

## 2024-11-19 RX ORDER — MIDAZOLAM HYDROCHLORIDE 1 MG/ML
INJECTION INTRAMUSCULAR; INTRAVENOUS
Status: COMPLETED
Start: 2024-11-19 | End: 2024-11-19

## 2024-11-19 RX ADMIN — MIDAZOLAM HYDROCHLORIDE 2 MG: 1 INJECTION, SOLUTION INTRAMUSCULAR; INTRAVENOUS at 09:09

## 2024-11-19 RX ADMIN — LIDOCAINE HYDROCHLORIDE: 20 INJECTION, SOLUTION INFILTRATION; PERINEURAL at 08:25

## 2024-11-19 RX ADMIN — IOHEXOL 65 ML: 350 INJECTION, SOLUTION INTRAVENOUS at 09:09

## 2024-11-19 RX ADMIN — VERAPAMIL HYDROCHLORIDE 2.5 MG: 2.5 INJECTION, SOLUTION INTRAVENOUS at 08:25

## 2024-11-19 RX ADMIN — FENTANYL CITRATE 75 MCG: 50 INJECTION, SOLUTION INTRAMUSCULAR; INTRAVENOUS at 09:09

## 2024-11-19 RX ADMIN — HEPARIN SODIUM: 1000 INJECTION, SOLUTION INTRAVENOUS; SUBCUTANEOUS at 08:25

## 2024-11-19 RX ADMIN — NITROGLYCERIN 10 ML: 20 INJECTION INTRAVENOUS at 08:25

## 2024-11-19 RX ADMIN — HEPARIN SODIUM 2000 UNITS: 200 INJECTION, SOLUTION INTRAVENOUS at 08:27

## 2024-11-19 ASSESSMENT — FIBROSIS 4 INDEX: FIB4 SCORE: 1.92

## 2024-11-19 NOTE — DISCHARGE INSTRUCTIONS
What to Expect Post Sedation    Rest and take it easy for the first 24 hours.  A responsible adult is recommended to remain with you during that time.  It is normal to feel sleepy.  We encourage you to not do anything that requires balance, judgment or coordination.    FOR 24 HOURS DO NOT:  Drive, operate machinery or run household appliances.  Drink beer or alcoholic beverages.  Make important decisions or sign legal documents.    To avoid nausea, slowly advance diet as tolerated, avoiding spicy or greasy foods for the first day.  Add more substantial food to your diet according to your provider's instructions.  Babies can be fed formula or breast milk as soon as they are hungry.  INCREASE FLUIDS AND FIBER TO AVOID CONSTIPATION.    MILD FLU-LIKE SYMPTOMS ARE NORMAL.  YOU MAY EXPERIENCE GENERALIZED MUSCLE ACHES, THROAT IRRITATION, HEADACHE AND/OR SOME NAUSEA.  If any questions arise, call your provider.  If your provider is not available, please feel free to call the [facility] at [phone].    MEDICATIONS: Resume taking daily medication.  Take prescribed pain medication with food.  If no medication is prescribed, you may take non-aspirin pain medication if needed.  PAIN MEDICATION CAN BE VERY CONSTIPATING.  Take a stool softener or laxative such as senokot, pericolace, or milk of magnesia if needed.      Diet    Resume your normal diet as tolerated.  A diet low in cholesterol, fat, and sodium is recommended for good health.     Discharge Instructions:  POST ANGIOGRAM  General Care Instructions  Maintain a bandage over the incision site for 24 hours.  It's normal to find a small bruise or dime-sized lump at the insertion site. This should disappear within a few weeks.  Do not apply lotions or powders to the site.  Do not immerse the catheter insertion site in water (bathtub/swimming) for five days. It is ok to shower 24 hours after the procedure.  You may resume your normal diet immediately; on the day of  your procedure, drink 6-10 glasses of water to help flush the contrast liquid out of your system.  If the doctor inserted the catheter in your arm:  For 3 days, DO NOT lift anything heavier than 10 pounds (approximately a gallon of milk). DO NOT do any heavy pushing, pulling, or twisting.    Medications  If your current medications need to be changed, you will be provided with an updated list of your medications prior to discharge.  If you take warfarin (Coumadin), resume taking your usual dose the evening after the procedure.  DO NOT STOP taking prescribed blood thinning (anti-platelet) medications unless instructed by your cardiologist.  These medications include:  Aspirin, Clopidogrel (Plavix), Ticagrelor (Brilinta), or Prasugrel (Effient)   If you take one of the following anticoagulants, RESUME 24 HOURS after your procedure:  Apixiban (Eliquis), Rivaroxaban (Xarelto), Dabigatran (Pradaxa), Edoxaban (Savaysa)  If you take metformin (Glucophage), RESUME 48 HOURS after your procedure.    When to call your healthcare provider  Call your cardiologist right away at [phone] if you have any of the following:   Problems/Concerns taking any of your prescribed heart medicines.   The insertion site has increasing pain, swelling, redness, bleeding, or drainage.   Your arm or leg below where the insertion site changes color, is cool, or is numb.   You have chest pain or shortness of breath that does not go away with rest.   Your pulse feels irregular -- very slow (less than 60 beats/minute) or very fast (over 100 beats/minute).   You have dizziness, fainting, or you are very tired.   You are coughing up blood or yellow or green mucus.   You have chills or a fever over 101°F (38.3°C).    If there is bleeding at the catheter insertion site, apply pressure for 10 minutes.  If bleeding persists, call [phone], and continue to hold pressure until advanced medical support arrives.        Exercising Safely After Percutaneous  Coronary Intervention (PCI)  After percutaneous coronary intervention (PCI), which involves angioplasty and often stenting, it's important to focus on your heart health. Exercise can help strengthen your heart. It can also help you feel good and improve your overall health. Talk with your health care provider or cardiac rehab team member about good options for you.  Start slowly. Work up to more vigorous exercise as you get stronger. Aim for at least 150 minutes of exercise each week.  Include aerobic activities. These make the heart beat faster. They work the heart and lungs, and improve the body's ability to use oxygen. Good choices include walking, swimming, and biking .  Always follow your doctor's recommendation for exercise.   You have been referred to cardiac rehabilitation, which is important for your recovery.  You may contact [facility]'s Intensive [facility] Re[facility] Program at [phone] to learn more and schedule a visit.        Lifestyle Management After Percutaneous Coronary Intervention (PCI)  Percutaneous coronary intervention (PCI)  involves angioplasty and often stenting. This procedure can open arteries and relieve symptoms. But, it doesn't cure coronary artery disease. New blockages can still form. You need to take steps to prevent this by managing risk factors. Doing so will help make your heart and arteries healthier. Your doctor may prescribe cardiac rehabilitation to help with this lifelong process.  Understanding risk factors  Some risk factors for coronary artery disease can be controlled. These include smoking, high blood pressure, cholesterol, diabetes, and obesity. They can be managed with medication, diet, and exercise. Support and counseling can also play a role. The effort will pay off! Managing risk factors can help you be more active, feel better, and reduce the risk of heart attack.    If you smoke, quit!  If your doctor has been urging you to quit smoking, it's for good reasons. Smoking  damages your heart, blood vessels, and lungs. The good news is that quitting can halt or even reverse the damage of smoking. To quit now:  Get medical help. Ask your doctor for advice on stop-smoking programs. Also ask about medications or nicotine replacement therapy products that may help you quit smoking.  Get support. Join a support group. Ask for help from your family and friends.  Don't give up. It often takes several tries to succeed in quitting smoking.  Avoid secondhand smoke. Ask family and friends not to smoke around you.    DRESSING: Keep dressing and incision dry and intact for 24 hours, may remove dressing and shower after  11 am  on 11/20, do not need to replace.  Do not submerge site in water for 7 days.     BOWEL FUNCTION:  If you are having problems, use what you normally would or call your provider for suggestions. It also helps to stay regular by including fiber in your diet (for example: bran or fruits and vegetables) and drink plenty of liquids (water, juice, etc.).

## 2024-11-19 NOTE — PROCEDURES
"CARDIAC CATHETERIZATION REPORT    REFERRING: Shaggy Roberts M.D.    PROCEDURE PHYSICIAN: Gustavo Davila MD, [facility], [facility]  ASSISTANT: None    IMPRESSIONS:  1.  Severe aortic stenosis by noninvasive evaluation  2.  45 mm ascending aortic aneurysm  3.  Nonobstructive two-vessel coronary atherosclerosis    Recommendations:  Further recommendations per surgical team    Pre-procedure diagnosis: Severe aortic stenosis  Post-procedure diagnosis: Same    Procedure performed  Selective coronary angiography  Supravalvular aortography    Conscious sedation was supervised by myself and administered by trained personnel using fentanyl and versed between 842 and 911. The patient tolerated sedation without complication.     Procedure Description  1. Access: 5/6 Scottish right radial artery Micropuncture technique was utilized following local anesthesia with lidocaine.  A radial cocktail containing verapamil and saline was administered in the radial artery sheath    2. Diagnostic description: The catheter was passed to the central circulation with the aide of J tipped 0.35\" wire. A 6F JL4, 6F Multipurpose, and 6F Pigtail were used to inject the coronary circulation and inject the ascending aorta aortography performed for surgical planning purposes..     3. Closing: At completion of the procedure the relevant equipment was removed from the body and hemostasis achieved by Radial band    Findings   Hemodynamics:   Aorta: 166/82 mmHg    Coronary Anatomy   Left Main: Normal   LAD: Minimal luminal irregularities   LCx: Minimal luminal irregularities   RCA: Dominant, Normal     Supravalvular aortogram:  Enlarged ascending aorta estimated 45 mm      Technical Factors  1. Complications: None  2. Estimated Blood Loss: <50 cc  3. Specimens: None  4. Contrast Volume: 65 ml  5. Medications: Radial cocktail (Verapamil 2.5 mg, Nitroglycerin 100 mcg) Heparin 5000 Units  6. Radiation (air kerma): 136 mGy    "

## 2024-11-19 NOTE — OR NURSING
0924 Patient arrived to PACU from cath lab.  Report from RN.  Patient is awake.  TR band to right wrist is CDI, soft, patient denies numbness or tingling to right hand.  Educated on wrist precautions.  Patient updated on plan of care.    0930 wife called and updated on patient status. Patient tolerating oral intake.     1007 patient up to bathroom, steady on feet    1020 3 ml air removed from TR band, no bleeding to site.     1035 3 ml air removed from TR band, no bleeding to site.     1050 3 ml air removed from TR band, no bleeding to site.     1110 remaining ml air removed from TR band, no bleeding to site. TR band removed, gauze and tegaderm dressing applied. Site CDI, soft.      1130 Patient up to edge of bed, denies discomfort.  Access site CDI, no bleeding, soft.  All lines and monitors discontinued. Reviewed discharge paperwork with pt and wife. Discussed diet, activity, medications, follow up care and worsening symptoms. No questions at this time.

## 2024-11-20 ENCOUNTER — TELEPHONE (OUTPATIENT)
Dept: CARDIOLOGY | Facility: MEDICAL CENTER | Age: 69
End: 2024-11-20
Payer: MEDICARE

## 2024-11-21 NOTE — TELEPHONE ENCOUNTER
Called pt [phone] to review CW recommendations.  unable to reach.  Left message on voicemail to return this call at their earliest convenience.    Upon chart review, pt is active on [employer].  Last login 11/19/2024.  [employer] message sent regarding CW recommendations,

## 2024-11-22 NOTE — TELEPHONE ENCOUNTER
90 DAY COURTESY REFILL SENT.   Name: Haylee Balbuena      : 1968      MRN: [id number]  Encounter Provider: Denny Hidalgo DO  Encounter Date: 2024   Encounter department: [facility]  :  Assessment & Plan  Oropharyngeal dysphagia  Cause unclear but she may have some degree of LPR possibly resulting in peptic stricture.  There may be oropharyngeal dysphagia related to the swallow reflex itself.  Other potential etiologies include esophageal stricture versus EOE versus dysmotility versus hiatal hernia but this is less likely based on her complaints.    We will schedule her for EGD for evaluation of esophageal pathology that may be causing her symptoms  We will restart pantoprazole 40 mg daily to help with any reflux which may be contributing  She may continue Pepcid 1-2 times a day as needed for breakthrough symptoms    I obtained informed consent from the patient.  The risks/benefits/alternatives of the procedure were discussed with the patient.  Risks included, but not limited to, infection, bleeding, perforation, injury to organs in the abdomen, missed lesion and incomplete procedure were discussed.  Patient was agreeable and electronic signature was obtained.     Orders:    EGD; Future    pantoprazole (PROTONIX) 40 mg tablet; Take 1 tablet (40 mg total) by mouth daily    Gastroesophageal reflux disease without esophagitis  Has history of reflux although she denies any overt heartburn symptoms now.  ENT performed laryngoscopy which showed evidence of inflammation in the arytenoids.  This may be indicative of reflux and LPR.    Will schedule her for EGD for evaluation  Start Protonix 40 mg daily  May use Pepcid 1-2 times a day as needed    Orders:    pantoprazole (PROTONIX) 40 mg tablet; Take 1 tablet (40 mg total) by mouth daily    Gastroparesis  As history of gastroparesis which could certainly contribute to her symptoms although this would not explain overt dysphagia symptoms.    Continue  management  Gastroparesis diet  Plan for EGD  Follow-up in the office             History of Present Illness     55-year-old female presents to GI office for evaluation of dysphagia symptoms.      Haylee Balbuena is a 55 y.o. female who presents to GI office for evaluation of dysphagia symptoms.  Patient is new to me but has been following with my colleague.  She has history of gastroparesis, IBS, GERD, colon polyps.  Patient was recently seen by ENT and underwent laryngoscopy.  This showed evidence of inflammation.  She reports having dysphagia symptoms which is primarily in the back of the throat.  Feels that she has dysphagia to both solids and liquids.  She does feel that her symptoms are getting worse.  This is now been going on for the last couple of months.  She denies any odynophagia and denies any overt heartburn symptoms.  She is on Pepcid 1-2 times a day.  Reports being on pantoprazole in the past but this was discontinued.  Weight fortunately remained stable.  Denies any significant nausea in spite of her history of gastroparesis.    History obtained from: patient    Review of Systems   Constitutional:  Negative for appetite change.   HENT:  Positive for trouble swallowing.    Respiratory:  Negative for shortness of breath.    Cardiovascular:  Negative for chest pain and palpitations.   Gastrointestinal:  Negative for abdominal distention, abdominal pain, blood in stool, constipation, diarrhea, nausea, rectal pain and vomiting.   All other systems reviewed and are negative.    Medical History Reviewed by provider this encounter:     .  Current Outpatient Medications on File Prior to Visit   Medication Sig Dispense Refill    albuterol (2.5 mg/3 mL) 0.083 % nebulizer solution Take 3 mL (2.5 mg total) by nebulization every 6 (six) hours as needed for wheezing or shortness of breath 180 mL 5    albuterol (PROVENTIL HFA,VENTOLIN HFA) 90 mcg/act inhaler INHALE ONE PUFF BY MOUTH AND INTO THE LUNGS EVERY 6 HOURS  AS NEEDED FOR WHEEZING 36 g 3    Alcohol Swabs (Pharmacist Choice Alcohol) PADS Apply topically 4 (four) times a day Use as directed 300 each 5    aspirin (ECOTRIN LOW STRENGTH) 81 mg EC tablet TAKE ONE TABLET BY MOUTH ONCE DAILY 30 tablet 5    atorvastatin (LIPITOR) 80 mg tablet TAKE ONE TABLET BY MOUTH DAILY AT BEDTIME 180 tablet 3    B-D UF III MINI PEN NEEDLES 31G X 5 MM MISC Pt uses 5 pen needles daily 500 each 0    cholecalciferol (VITAMIN D3) 1,000 units tablet Take 2 tablets (2,000 Units total) by mouth daily 60 tablet 3    citalopram (CeleXA) 40 mg tablet TAKE ONE TABLET BY MOUTH DAILY 90 tablet 1    Continuous Glucose Sensor (Dexcom G7 Sensor) Use 1 Device every 10 days 9 each 3    dicyclomine (BENTYL) 20 mg tablet Take twice daily (am and before bedtime), may take additional 2 doses per day, max 4x/day (every 6 hours if needed) 90 tablet 0    docusate sodium (COLACE) 100 mg capsule TAKE ONE CAPSULE BY MOUTH TWICE DAILY 240 capsule 3    ergocalciferol (VITAMIN D2) 50,000 units TAKE ONE CAPSULE BY MOUTH ONCE A WEEK 12 capsule 3    famotidine (PEPCID) 40 MG tablet TAKE ONE TABLET BY MOUTH TWO HOURS PRIOR TO BEDTIME 30 tablet 5    fenofibrate micronized (LOFIBRA) 134 MG capsule Take 1 capsule (134 mg total) by mouth daily with breakfast 90 capsule 1    Glucagon (Baqsimi One Pack) 3 MG/DOSE POWD 1 each into each nostril if needed (PRN for hypoglycemia emergency) 1 each 0    Insulin Regular Human, Conc, (HumuLIN R U-500 KwikPen) 500 units/mL CONCENTRATED U-500 injection pen E11.65 inject 85 units with breakfast, 25 units with lunch 25 with dinner, or as directed TDD up to 200 units      Iron Heme Polypeptide 12 MG TABS Take 1 tablet by mouth      Jardiance 25 MG TABS TAKE ONE TABLET BY MOUTH EVERY MORNING 90 tablet 1    levETIRAcetam (KEPPRA) 500 mg tablet TAKE ONE TABLET BY MOUTH TWICE DAILY 180 tablet 1    linaCLOtide (Linzess) 72 MCG CAPS Take 1 capsule by mouth daily before breakfast 30 capsule 0     "Magnesium Citrate 200 MG TABS Take 1 tablet by mouth 2 (two) times a day 180 tablet 1    meclizine (ANTIVERT) 25 mg tablet Take 1 tablet (25 mg total) by mouth 3 (three) times a day as needed for dizziness 30 tablet 0    methocarbamol (ROBAXIN) 500 mg tablet Take 1 tablet (500 mg total) by mouth 3 (three) times a day as needed for muscle spasms 90 tablet 0    metoclopramide (Reglan) 10 mg tablet Take 0.5 tablets (5 mg total) by mouth every 6 (six) hours 30 tablet 0    metoprolol tartrate (LOPRESSOR) 25 mg tablet TAKE ONE TABLET BY MOUTH TWICE DAILY 180 tablet 1    multivitamin (THERAGRAN) TABS Take 1 tablet by mouth every morning      ondansetron (ZOFRAN) 4 mg tablet TAKE 1 TABLET (4 MG TOTAL) BY MOUTH EVERY 8 (EIGHT) HOURS AS NEEDED FOR NAUSEA OR VOMITING 30 tablet 1    OneTouch Ultra test strip USE 4 TIMES A  each 0    oxybutynin (DITROPAN-XL) 10 MG 24 hr tablet TAKE ONE TABLET BY MOUTH DAILY AT BEDTIME 100 tablet 1    Pharmacist Choice Lancets MISC USE AS DIRECTED 100 each 1    pregabalin (LYRICA) 100 mg capsule TAKE ONE CAPSULE BY MOUTH THREE TIMES A  capsule 1    Restasis 0.05 % ophthalmic emulsion       tirzepatide (Mounjaro) 10 MG/0.5ML Inject 0.5 mL (10 mg total) under the skin every 7 days 2 mL 3     No current facility-administered medications on file prior to visit.      Social History     Tobacco Use    Smoking status: Former     Current packs/day: 0.00     Types: Cigarettes     Quit date:      Years since quittin.9    Smokeless tobacco: Never   Vaping Use    Vaping status: Never Used   Substance and Sexual Activity    Alcohol use: Never     Comment: occ    Drug use: Never    Sexual activity: Not Currently     Partners: Male     Birth control/protection: None        Objective   Pulse 67   Temp 98 °F (36.7 °C) (Temporal)   Ht 5' 1\" (1.549 m)   Wt 79.4 kg (175 lb)   SpO2 95%   BMI 33.07 kg/m²      Physical Exam  Vitals reviewed.   Constitutional:       Appearance: Normal " appearance.   HENT:      Head: Normocephalic and atraumatic.      Nose: Nose normal.   Eyes:      Extraocular Movements: Extraocular movements intact.   Cardiovascular:      Rate and Rhythm: Normal rate and regular rhythm.   Pulmonary:      Effort: Pulmonary effort is normal. No respiratory distress.   Abdominal:      General: Abdomen is flat. Bowel sounds are normal. There is no distension.      Palpations: Abdomen is soft. There is no mass.      Tenderness: There is no abdominal tenderness. There is no guarding.   Neurological:      General: No focal deficit present.      Mental Status: She is alert.   Psychiatric:         Mood and Affect: Mood normal.         Behavior: Behavior normal.         Administrative Statements   I have spent a total time of 20 minutes in caring for this patient on the day of the visit/encounter including Diagnostic results, Risks and benefits of tx options, Instructions for management, Risk factor reductions, Impressions, Documenting in the medical record, Reviewing / ordering tests, medicine, procedures  , and Obtaining or reviewing history  . Topics discussed with the patient / family include symptom assessment and management, medication review, and medication adjustment.

## 2024-11-25 ENCOUNTER — PATIENT MESSAGE (OUTPATIENT)
Dept: CARDIOLOGY | Facility: MEDICAL CENTER | Age: 69
End: 2024-11-25
Payer: MEDICARE

## 2024-11-25 NOTE — LETTER
November 26, 2024       Patient: Lionel Epperson   YOB: 1955   Date of Visit: 11/25/2024         To Whom It May Concern:    In my medical opinion, I recommend that Lionel Epperson requires open heart surgery and will not be able to participate in the 2024/2025 ski season.    If you have any questions or concerns, please don't hesitate to call [phone]          Sincerely,      Electronically signed    Shaggy Roberts MD PhD [facility]  Cardiologist [facility]      11/26/2024  1:44 PM

## 2024-11-26 ENCOUNTER — PATIENT MESSAGE (OUTPATIENT)
Dept: CARDIOLOGY | Facility: MEDICAL CENTER | Age: 69
End: 2024-11-26
Payer: MEDICARE

## 2024-11-26 NOTE — PATIENT COMMUNICATION
To TIMOTHY pt requesting to complete a form to receive a refund for his season pass for skiing.  Please review document uploaded to media tab 11/22/24 and advise if ok to complete form, thank you!

## 2024-11-27 ENCOUNTER — HOSPITAL ENCOUNTER (OUTPATIENT)
Dept: RADIOLOGY | Facility: MEDICAL CENTER | Age: 69
End: 2024-11-27
Attending: NURSE PRACTITIONER
Payer: MEDICARE

## 2024-11-27 ENCOUNTER — OFFICE VISIT (OUTPATIENT)
Dept: CARDIOTHORACIC SURGERY | Facility: MEDICAL CENTER | Age: 69
End: 2024-11-27
Payer: MEDICARE

## 2024-11-27 VITALS
TEMPERATURE: 98.3 F | OXYGEN SATURATION: 100 % | HEIGHT: 73 IN | BODY MASS INDEX: 25.18 KG/M2 | SYSTOLIC BLOOD PRESSURE: 132 MMHG | WEIGHT: 190 LBS | HEART RATE: 59 BPM | DIASTOLIC BLOOD PRESSURE: 90 MMHG

## 2024-11-27 DIAGNOSIS — I35.0 SEVERE AORTIC STENOSIS: ICD-10-CM

## 2024-11-27 DIAGNOSIS — I71.21 ANEURYSM OF ASCENDING AORTA WITHOUT RUPTURE (HCC): ICD-10-CM

## 2024-11-27 PROCEDURE — 700117 HCHG RX CONTRAST REV CODE 255: Performed by: NURSE PRACTITIONER

## 2024-11-27 PROCEDURE — 71275 CT ANGIOGRAPHY CHEST: CPT

## 2024-11-27 PROCEDURE — 93880 EXTRACRANIAL BILAT STUDY: CPT

## 2024-11-27 RX ADMIN — IOHEXOL 100 ML: 350 INJECTION, SOLUTION INTRAVENOUS at 17:42

## 2024-11-27 ASSESSMENT — FIBROSIS 4 INDEX: FIB4 SCORE: 1.92

## 2024-11-27 ASSESSMENT — ENCOUNTER SYMPTOMS
BLURRED VISION: 1
BACK PAIN: 1
GASTROINTESTINAL NEGATIVE: 1
CARDIOVASCULAR NEGATIVE: 1
DIZZINESS: 1
CONSTITUTIONAL NEGATIVE: 1
RESPIRATORY NEGATIVE: 1
PSYCHIATRIC NEGATIVE: 1

## 2024-11-27 NOTE — PROGRESS NOTES
Problem: Prehabilitation    Goal: optimize identified modifiable risk factors prior to cardiac surgery.     Intervention: Screening and interventions for the following  risk factors with educational materials provided if indicated  and patient demonstrates readiness to participate.  Dentition, malnutrition, CAD and dietary cholesterol,  obesity, alcohol and tobacco abuse, illegal drug use,  recent eye surgery/procedures, A1C > 7.5 for pharmacotherapy referral,  and social support system for post discharge planning.    Additionally, home exercise regimen initiation or continuation  (if appropriate), and teaching of and participation of  inspiratory muscle training via incentive spirometer (provided).    Review of post-surgical physical limitations, upcoming  appointments & testing, ordered diagnostics, and medication review  to identify anticoagulants, antihypertensives, HF and DM medications  that need cessation prior to surgery.    The cardiac surgery prehabilitation sheet, surgery instruction sheet,  MAP, and education booklet was provided for patient to read and review.    Surgical CHG wipes were also provided with instructions on use.    DENTITION:  Routine dental appointment prior to surgery  Is  indicated for valve procedure.    he was not encouraged to get a dental   cleaning as he does get regular  dental cleaning and denies current dental   infection or issues that should be  addressed prior to surgery.    INCENTIVE SPIROMETRY:  Discussed importance of incentive spirometry (IS) use,  20 times a day AT MINIMUM but more often if possible to  optimize cardio-pulmonary function prior to surgery.  They were instructed to allow a 5 minute rest in  between uses to achieve max IS volume.  Education with return demonstration performed.   Coaching was provided, patient is effective.    Prehabilitation IS baseline is 4250.    HOME EXERCISE REGIMEN:  We discussed importance of preventing deconditioning and  muscle  wasting in the time preceding surgery as this will occur  post surgery.    > 50 % left main disease present? NO  EF-- 60%  Active sub lingual nitro use? NO  he is appropriate for initiation  of a home exercise regimen.    he is very physically active; current  exercise tolerance/level is high due to no  symptoms with activity.    he was educated to continue his/her current exercise regimen of   [employer], yoga, golf, hiking, and walking his dog most days a week.     he was educated  that if chest pain or SOB occurs patient is to stop  immediately and if symptoms do not  resolve after stopping to call 911.    he was also encouraged to practice sit to stand  from a chair with one arm to assist or no arm assistance  12 times a day to strengthen quadriceps, improve balance,  and to mentally prepare for this restriction    CAD:  Patient does not have known CAD; education on  cholesterol, diet, and the heart are not indicated  and were not provided.    OBESITY:  Patient's BMI is not over 30.  Education regarding portion  Sizing and diet are not indicated and were not provided.    MALNUTRITION:  Malnutrition screening tool (MST) shows he is  not at risk with a score of 0.  (0-1 not at risk; greater or equal to 2 is at risk).    Given MST score, functional capacity,   and no reported muscle loss, it was not  recommended they increase their protein  intake to 1.2 to 2.5 gram/kg/day.    SMOKING:  Patient denies current smoking history.  Smoking risks  and cessation education not indicated and was not provided.    ALCOHOL ABUSE:  Patient denies alcohol abuse.  Alcohol risks and cessation  education not indicated and was not provided.    ILLEGAL DRUG USE:  Patient denies illicit drug use.  Illicit drug use risks  and cessation education not indicated and was not provided.    SOCIAL SUPPORT SYSTEM FOR DISCHARGE NEEDS:    Patient does have family, his wife Abdullahi to stay for  2 days post discharge to assist with ADL's. No  barriers  to hospital discharge anticipated.    PSYCHOLOGICAL PREPARATION:  We discussed the basics of physical limitation post op to include:               No driving for 4 weeks               No lifting, pushing or pulling > 10 lbs for 6 weeks  Sternal precautions to include moving  Within the tube and safe mobility in and  out of bed and the chair.    ANTIBIOTIC STEWARDSHIP:  MRSA swab will be collected by [facility] during pre-admit testing.    MEDICATION AN UPCOMING APPOINTMENTS REVIEW:  Current medications were reviewed that may need  specific stop dates prior to surgery. The patient was instructed   to stop the following medications after the indicated date.    Lisinopril to stop 2 full days prior to surgery; last dose 12/17    Vascular scheduling sheet was provided and explained.    Walk test Times: 3 3 3    A phone appointment for pre op education was made  for 12/17 between 8-10 to review all provided education materials.

## 2024-11-27 NOTE — PATIENT COMMUNICATION
Monae paperwork completed to the best of this RNs ability and placed in CW folder awaiting review and signature.    Draft imported to media tab for reference.   Albendazole Pregnancy And Lactation Text: This medication is Pregnancy Category C and it isn't known if it is safe during pregnancy. It is also excreted in breast milk.

## 2024-11-27 NOTE — PATIENT COMMUNICATION
To CW, pt states [employer] Pass will only accept the form provided to be completed and signed.  I can complete it and obtain your signature next week.  Thank you!

## 2024-12-02 ENCOUNTER — APPOINTMENT (OUTPATIENT)
Dept: ADMISSIONS | Facility: MEDICAL CENTER | Age: 69
DRG: 219 | End: 2024-12-02
Attending: THORACIC SURGERY (CARDIOTHORACIC VASCULAR SURGERY)
Payer: MEDICARE

## 2024-12-02 ENCOUNTER — PATIENT MESSAGE (OUTPATIENT)
Dept: CARDIOLOGY | Facility: MEDICAL CENTER | Age: 69
End: 2024-12-02
Payer: MEDICARE

## 2024-12-02 NOTE — PATIENT COMMUNICATION
Form emailed to Simran and JAMESON Castañeda to follow up with CW to review and sign form.  Received Teams message from CHASE Kauffman regarding update, see below. Noted findings.

## 2024-12-06 ENCOUNTER — PRE-ADMISSION TESTING (OUTPATIENT)
Dept: ADMISSIONS | Facility: MEDICAL CENTER | Age: 69
DRG: 219 | End: 2024-12-06
Attending: THORACIC SURGERY (CARDIOTHORACIC VASCULAR SURGERY)
Payer: MEDICARE

## 2024-12-11 RX ORDER — METHADONE HYDROCHLORIDE 10 MG/ML
20 INJECTION, SOLUTION INTRAMUSCULAR; INTRAVENOUS; SUBCUTANEOUS ONCE
Status: CANCELLED | OUTPATIENT
Start: 2024-12-20 | End: 2024-12-20

## 2024-12-17 ENCOUNTER — TELEPHONE (OUTPATIENT)
Dept: CARDIOTHORACIC SURGERY | Facility: MEDICAL CENTER | Age: 69
End: 2024-12-17
Payer: MEDICARE

## 2024-12-17 NOTE — TELEPHONE ENCOUNTER
Patient was reminded that AVR and TAA surgery with  Dr. Mina is on 12/19 at 0730 in the morning. he  is are aware check in is at 0515 am.    PAT date and time was reviewed with patient and  verified it is within 72 hours of surgery.    Vascular studies and procedures: carotids and CTA  were scheduled, completed,  and reviewed by myself for concerning  results did not need escalation to the [facility]BIR/MD.    he did not need a dental check/work.    Baseline IS was 4250. he has been compliant   with the IS. Volume has not improved.    He was prescribed a walking regimen or  told to continue he current regimen and  he has been compliant.   he has been practicing sit to stand.    he was reminded that lisinopril needs to stop after 12/17.    he was told that no medication (s) are okay to  take the morning of surgery prior to check in.    The [facility] wipes instructions were reviewed and understood.    he was reminded no food after midnight.    Call time 14 minutes

## 2024-12-18 ENCOUNTER — HOSPITAL ENCOUNTER (OUTPATIENT)
Dept: RADIOLOGY | Facility: MEDICAL CENTER | Age: 69
DRG: 219 | End: 2024-12-18
Attending: THORACIC SURGERY (CARDIOTHORACIC VASCULAR SURGERY) | Admitting: THORACIC SURGERY (CARDIOTHORACIC VASCULAR SURGERY)
Payer: MEDICARE

## 2024-12-18 ENCOUNTER — PRE-ADMISSION TESTING (OUTPATIENT)
Dept: ADMISSIONS | Facility: MEDICAL CENTER | Age: 69
DRG: 219 | End: 2024-12-18
Attending: THORACIC SURGERY (CARDIOTHORACIC VASCULAR SURGERY)
Payer: MEDICARE

## 2024-12-18 DIAGNOSIS — Z01.812 PRE-OPERATIVE LABORATORY EXAMINATION: ICD-10-CM

## 2024-12-18 DIAGNOSIS — Z01.811 PRE-OPERATIVE RESPIRATORY EXAMINATION: ICD-10-CM

## 2024-12-18 DIAGNOSIS — Z01.810 PRE-OPERATIVE CARDIOVASCULAR EXAMINATION: ICD-10-CM

## 2024-12-18 LAB
ABO GROUP BLD: NORMAL
ALBUMIN SERPL BCP-MCNC: 4.4 G/DL (ref 3.2–4.9)
ALBUMIN/GLOB SERPL: 1.7 G/DL
ALP SERPL-CCNC: 47 U/L (ref 30–99)
ALT SERPL-CCNC: 32 U/L (ref 2–50)
AMPHET UR QL SCN: NEGATIVE
ANION GAP SERPL CALC-SCNC: 9 MMOL/L (ref 7–16)
APPEARANCE UR: CLEAR
APTT PPP: 25.5 SEC (ref 24.7–36)
AST SERPL-CCNC: 36 U/L (ref 12–45)
BARBITURATES UR QL SCN: NEGATIVE
BASOPHILS # BLD AUTO: 0.8 % (ref 0–1.8)
BASOPHILS # BLD: 0.06 K/UL (ref 0–0.12)
BENZODIAZ UR QL SCN: NEGATIVE
BILIRUB SERPL-MCNC: 0.7 MG/DL (ref 0.1–1.5)
BILIRUB UR QL STRIP.AUTO: NEGATIVE
BLD GP AB SCN SERPL QL: NORMAL
BUN SERPL-MCNC: 17 MG/DL (ref 8–22)
BZE UR QL SCN: NEGATIVE
CALCIUM ALBUM COR SERPL-MCNC: 9.6 MG/DL (ref 8.5–10.5)
CALCIUM SERPL-MCNC: 9.9 MG/DL (ref 8.5–10.5)
CANNABINOIDS UR QL SCN: NEGATIVE
CHLORIDE SERPL-SCNC: 105 MMOL/L (ref 96–112)
CO2 SERPL-SCNC: 25 MMOL/L (ref 20–33)
COLOR UR: YELLOW
CREAT SERPL-MCNC: 1.11 MG/DL (ref 0.5–1.4)
EKG IMPRESSION: NORMAL
EOSINOPHIL # BLD AUTO: 0.11 K/UL (ref 0–0.51)
EOSINOPHIL NFR BLD: 1.4 % (ref 0–6.9)
ERYTHROCYTE [DISTWIDTH] IN BLOOD BY AUTOMATED COUNT: 40.6 FL (ref 35.9–50)
EST. AVERAGE GLUCOSE BLD GHB EST-MCNC: 108 MG/DL
FENTANYL UR QL: NEGATIVE
GFR SERPLBLD CREATININE-BSD FMLA CKD-EPI: 72 ML/MIN/1.73 M 2
GLOBULIN SER CALC-MCNC: 2.6 G/DL (ref 1.9–3.5)
GLUCOSE SERPL-MCNC: 98 MG/DL (ref 65–99)
GLUCOSE UR STRIP.AUTO-MCNC: NEGATIVE MG/DL
HBA1C MFR BLD: 5.4 % (ref 4–5.6)
HCT VFR BLD AUTO: 46.7 % (ref 42–52)
HGB BLD-MCNC: 16.1 G/DL (ref 14–18)
IMM GRANULOCYTES # BLD AUTO: 0.02 K/UL (ref 0–0.11)
IMM GRANULOCYTES NFR BLD AUTO: 0.3 % (ref 0–0.9)
INR PPP: 1.07 (ref 0.87–1.13)
KETONES UR STRIP.AUTO-MCNC: ABNORMAL MG/DL
LEUKOCYTE ESTERASE UR QL STRIP.AUTO: NEGATIVE
LYMPHOCYTES # BLD AUTO: 2.53 K/UL (ref 1–4.8)
LYMPHOCYTES NFR BLD: 32.8 % (ref 22–41)
MCH RBC QN AUTO: 30.4 PG (ref 27–33)
MCHC RBC AUTO-ENTMCNC: 34.5 G/DL (ref 32.3–36.5)
MCV RBC AUTO: 88.3 FL (ref 81.4–97.8)
METHADONE UR QL SCN: NEGATIVE
MICRO URNS: ABNORMAL
MONOCYTES # BLD AUTO: 0.74 K/UL (ref 0–0.85)
MONOCYTES NFR BLD AUTO: 9.6 % (ref 0–13.4)
NEUTROPHILS # BLD AUTO: 4.25 K/UL (ref 1.82–7.42)
NEUTROPHILS NFR BLD: 55.1 % (ref 44–72)
NITRITE UR QL STRIP.AUTO: NEGATIVE
NRBC # BLD AUTO: 0 K/UL
NRBC BLD-RTO: 0 /100 WBC (ref 0–0.2)
OPIATES UR QL SCN: NEGATIVE
OXYCODONE UR QL SCN: NEGATIVE
PCP UR QL SCN: NEGATIVE
PH UR STRIP.AUTO: 6.5 [PH] (ref 5–8)
PLATELET # BLD AUTO: 216 K/UL (ref 164–446)
PMV BLD AUTO: 12.3 FL (ref 9–12.9)
POTASSIUM SERPL-SCNC: 4.3 MMOL/L (ref 3.6–5.5)
PROPOXYPH UR QL SCN: NEGATIVE
PROT SERPL-MCNC: 7 G/DL (ref 6–8.2)
PROT UR QL STRIP: NEGATIVE MG/DL
PROTHROMBIN TIME: 13.9 SEC (ref 12–14.6)
RBC # BLD AUTO: 5.29 M/UL (ref 4.7–6.1)
RBC UR QL AUTO: NEGATIVE
RH BLD: NORMAL
SCCMEC + MECA PNL NOSE NAA+PROBE: NEGATIVE
SCCMEC + MECA PNL NOSE NAA+PROBE: NEGATIVE
SODIUM SERPL-SCNC: 139 MMOL/L (ref 135–145)
SP GR UR STRIP.AUTO: 1.03
UROBILINOGEN UR STRIP.AUTO-MCNC: 1 EU/DL
WBC # BLD AUTO: 7.7 K/UL (ref 4.8–10.8)

## 2024-12-18 PROCEDURE — 87641 MR-STAPH DNA AMP PROBE: CPT

## 2024-12-18 PROCEDURE — 86901 BLOOD TYPING SEROLOGIC RH(D): CPT

## 2024-12-18 PROCEDURE — 85730 THROMBOPLASTIN TIME PARTIAL: CPT

## 2024-12-18 PROCEDURE — 81003 URINALYSIS AUTO W/O SCOPE: CPT

## 2024-12-18 PROCEDURE — 87640 STAPH A DNA AMP PROBE: CPT

## 2024-12-18 PROCEDURE — 85025 COMPLETE CBC W/AUTO DIFF WBC: CPT

## 2024-12-18 PROCEDURE — 80307 DRUG TEST PRSMV CHEM ANLYZR: CPT

## 2024-12-18 PROCEDURE — 36415 COLL VENOUS BLD VENIPUNCTURE: CPT

## 2024-12-18 PROCEDURE — 86850 RBC ANTIBODY SCREEN: CPT

## 2024-12-18 PROCEDURE — 93010 ELECTROCARDIOGRAM REPORT: CPT | Performed by: INTERNAL MEDICINE

## 2024-12-18 PROCEDURE — 83036 HEMOGLOBIN GLYCOSYLATED A1C: CPT

## 2024-12-18 PROCEDURE — 93005 ELECTROCARDIOGRAM TRACING: CPT | Mod: TC

## 2024-12-18 PROCEDURE — 86900 BLOOD TYPING SEROLOGIC ABO: CPT

## 2024-12-18 PROCEDURE — 85610 PROTHROMBIN TIME: CPT

## 2024-12-18 PROCEDURE — 80053 COMPREHEN METABOLIC PANEL: CPT

## 2024-12-18 PROCEDURE — 71046 X-RAY EXAM CHEST 2 VIEWS: CPT

## 2024-12-19 ENCOUNTER — ANESTHESIA EVENT (OUTPATIENT)
Dept: SURGERY | Facility: MEDICAL CENTER | Age: 69
DRG: 219 | End: 2024-12-19
Payer: MEDICARE

## 2024-12-20 ENCOUNTER — APPOINTMENT (OUTPATIENT)
Dept: RADIOLOGY | Facility: MEDICAL CENTER | Age: 69
DRG: 219 | End: 2024-12-20
Attending: THORACIC SURGERY (CARDIOTHORACIC VASCULAR SURGERY)
Payer: MEDICARE

## 2024-12-20 ENCOUNTER — HOSPITAL ENCOUNTER (INPATIENT)
Facility: MEDICAL CENTER | Age: 69
LOS: 5 days | DRG: 219 | End: 2024-12-25
Attending: THORACIC SURGERY (CARDIOTHORACIC VASCULAR SURGERY) | Admitting: THORACIC SURGERY (CARDIOTHORACIC VASCULAR SURGERY)
Payer: MEDICARE

## 2024-12-20 ENCOUNTER — ANESTHESIA (OUTPATIENT)
Dept: SURGERY | Facility: MEDICAL CENTER | Age: 69
DRG: 219 | End: 2024-12-20
Payer: MEDICARE

## 2024-12-20 ENCOUNTER — APPOINTMENT (OUTPATIENT)
Dept: CARDIOLOGY | Facility: MEDICAL CENTER | Age: 69
DRG: 219 | End: 2024-12-20
Attending: ANESTHESIOLOGY
Payer: MEDICARE

## 2024-12-20 DIAGNOSIS — G89.18 ACUTE POST-OPERATIVE PAIN: ICD-10-CM

## 2024-12-20 DIAGNOSIS — Z86.79 S/P ASCENDING AORTIC ANEURYSM REPAIR: ICD-10-CM

## 2024-12-20 DIAGNOSIS — Z95.2 S/P AVR (AORTIC VALVE REPLACEMENT): ICD-10-CM

## 2024-12-20 DIAGNOSIS — I48.0 PAROXYSMAL ATRIAL FIBRILLATION (HCC): ICD-10-CM

## 2024-12-20 DIAGNOSIS — Z98.890 S/P ASCENDING AORTIC ANEURYSM REPAIR: ICD-10-CM

## 2024-12-20 PROBLEM — D62 ACUTE BLOOD LOSS ANEMIA: Status: ACTIVE | Noted: 2024-12-20

## 2024-12-20 PROBLEM — Z99.11 ENCOUNTER FOR WEANING FROM VENTILATOR (HCC): Status: ACTIVE | Noted: 2024-12-20

## 2024-12-20 LAB
ABO + RH BLD: NORMAL
ACT BLD: 112 SEC (ref 74–137)
ACT BLD: 118 SEC (ref 74–137)
ACT BLD: 464 SEC (ref 74–137)
ACT BLD: 516 SEC (ref 74–137)
ACT BLD: 544 SEC (ref 74–137)
APTT PPP: 32.1 SEC (ref 24.7–36)
BARCODED ABORH UBTYP: 5100
BARCODED ABORH UBTYP: 5100
BARCODED ABORH UBTYP: 6200
BARCODED ABORH UBTYP: 7300
BARCODED ABORH UBTYP: 8400
BARCODED ABORH UBTYP: 8400
BARCODED PRD CODE UBPRD: NORMAL
BARCODED UNIT NUM UBUNT: NORMAL
BASE EXCESS BLDA CALC-SCNC: -1 MMOL/L (ref -4–3)
BASE EXCESS BLDA CALC-SCNC: -2 MMOL/L (ref -4–3)
BASE EXCESS BLDA CALC-SCNC: -6 MMOL/L (ref -4–3)
BASE EXCESS BLDA CALC-SCNC: -6 MMOL/L (ref -4–3)
BASE EXCESS BLDA CALC-SCNC: -7 MMOL/L (ref -4–3)
BASE EXCESS BLDA CALC-SCNC: -8 MMOL/L (ref -4–3)
BASE EXCESS BLDA CALC-SCNC: -8 MMOL/L (ref -4–3)
BASE EXCESS BLDA CALC-SCNC: -9 MMOL/L (ref -4–3)
BODY TEMPERATURE: ABNORMAL DEGREES
CA-I BLD ISE-SCNC: 1.05 MMOL/L (ref 1.1–1.3)
CA-I BLD ISE-SCNC: 1.09 MMOL/L (ref 1.1–1.3)
CA-I BLD ISE-SCNC: 1.14 MMOL/L (ref 1.1–1.3)
CA-I SERPL-SCNC: 1.1 MMOL/L (ref 1.1–1.3)
CO2 BLDA-SCNC: 17 MMOL/L (ref 32–48)
CO2 BLDA-SCNC: 18 MMOL/L (ref 32–48)
CO2 BLDA-SCNC: 18 MMOL/L (ref 32–48)
CO2 BLDA-SCNC: 19 MMOL/L (ref 32–48)
CO2 BLDA-SCNC: 26 MMOL/L (ref 32–48)
CO2 BLDA-SCNC: 27 MMOL/L (ref 32–48)
COMPONENT CT 8504CT: NORMAL
COMPONENT CT 8504CT: NORMAL
COMPONENT F 8504F: NORMAL
COMPONENT P 8504P: NORMAL
COMPONENT P 8504P: NORMAL
DELSYS IDSYS: ABNORMAL
EKG IMPRESSION: NORMAL
END TIDAL CARBON DIOXIDE IECO2: 28 MMHG
END TIDAL CARBON DIOXIDE IECO2: 28 MMHG
END TIDAL CARBON DIOXIDE IECO2: 30 MMHG
FIBRINOGEN PPP-MCNC: 197 MG/DL (ref 215–460)
GLUCOSE BLD STRIP.AUTO-MCNC: 106 MG/DL (ref 65–99)
GLUCOSE BLD STRIP.AUTO-MCNC: 117 MG/DL (ref 65–99)
GLUCOSE BLD STRIP.AUTO-MCNC: 126 MG/DL (ref 65–99)
GLUCOSE BLD STRIP.AUTO-MCNC: 129 MG/DL (ref 65–99)
GLUCOSE BLD STRIP.AUTO-MCNC: 162 MG/DL (ref 65–99)
GLUCOSE BLD STRIP.AUTO-MCNC: 164 MG/DL (ref 65–99)
GLUCOSE BLD STRIP.AUTO-MCNC: 189 MG/DL (ref 65–99)
GLUCOSE BLD STRIP.AUTO-MCNC: 190 MG/DL (ref 65–99)
GLUCOSE BLD STRIP.AUTO-MCNC: 194 MG/DL (ref 65–99)
GLUCOSE BLD STRIP.AUTO-MCNC: 212 MG/DL (ref 65–99)
GLUCOSE BLD STRIP.AUTO-MCNC: 229 MG/DL (ref 65–99)
GLUCOSE BLD STRIP.AUTO-MCNC: 265 MG/DL (ref 65–99)
GLUCOSE BLD STRIP.AUTO-MCNC: 88 MG/DL (ref 65–99)
HCO3 BLDA-SCNC: 16.5 MMOL/L (ref 21–28)
HCO3 BLDA-SCNC: 16.7 MMOL/L (ref 21–28)
HCO3 BLDA-SCNC: 17.4 MMOL/L (ref 21–28)
HCO3 BLDA-SCNC: 17.7 MMOL/L (ref 21–28)
HCO3 BLDA-SCNC: 18.2 MMOL/L (ref 21–28)
HCO3 BLDA-SCNC: 18.4 MMOL/L (ref 21–28)
HCO3 BLDA-SCNC: 24.2 MMOL/L (ref 21–28)
HCO3 BLDA-SCNC: 25.8 MMOL/L (ref 21–28)
HCT VFR BLD AUTO: 24.7 % (ref 42–52)
HCT VFR BLD AUTO: 24.7 % (ref 42–52)
HCT VFR BLD AUTO: 27 % (ref 42–52)
HCT VFR BLD AUTO: 35 % (ref 42–52)
HCT VFR BLD CALC: 34 % (ref 42–52)
HCT VFR BLD CALC: 35 % (ref 42–52)
HGB BLD-MCNC: 11.6 G/DL (ref 14–18)
HGB BLD-MCNC: 11.9 G/DL (ref 14–18)
HGB BLD-MCNC: 11.9 G/DL (ref 14–18)
HGB BLD-MCNC: 8.3 G/DL (ref 14–18)
HGB BLD-MCNC: 8.3 G/DL (ref 14–18)
HGB BLD-MCNC: 9.5 G/DL (ref 14–18)
HOROWITZ INDEX BLDA+IHG-RTO: 270 MM[HG]
HOROWITZ INDEX BLDA+IHG-RTO: 270 MM[HG]
HOROWITZ INDEX BLDA+IHG-RTO: 287 MM[HG]
HOROWITZ INDEX BLDA+IHG-RTO: 300 MM[HG]
HOROWITZ INDEX BLDA+IHG-RTO: 318 MM[HG]
HOROWITZ INDEX BLDA+IHG-RTO: 327 MM[HG]
INR PPP: 1.67 (ref 0.87–1.13)
INR PPP: 1.72 (ref 0.87–1.13)
LACTATE BLD-SCNC: 5.5 MMOL/L (ref 0.5–2)
LACTATE BLD-SCNC: 6.5 MMOL/L (ref 0.5–2)
MAGNESIUM SERPL-MCNC: 4.3 MG/DL (ref 1.5–2.5)
MODE IMODE: ABNORMAL
O2/TOTAL GAS SETTING VFR VENT: 100 %
O2/TOTAL GAS SETTING VFR VENT: 30 %
O2/TOTAL GAS SETTING VFR VENT: 40 %
PATHOLOGY CONSULT NOTE: NORMAL
PCO2 BLDA: 27.8 MMHG (ref 32–48)
PCO2 BLDA: 28.9 MMHG (ref 32–48)
PCO2 BLDA: 31 MMHG (ref 32–48)
PCO2 BLDA: 31.4 MMHG (ref 32–48)
PCO2 BLDA: 32.2 MMHG (ref 32–48)
PCO2 BLDA: 34.7 MMHG (ref 32–48)
PCO2 BLDA: 47.4 MMHG (ref 32–48)
PCO2 BLDA: 50 MMHG (ref 32–48)
PCO2 TEMP ADJ BLDA: 28.7 MMHG (ref 32–48)
PCO2 TEMP ADJ BLDA: 28.8 MMHG (ref 32–48)
PCO2 TEMP ADJ BLDA: 30 MMHG (ref 32–48)
PCO2 TEMP ADJ BLDA: 30.1 MMHG (ref 32–48)
PCO2 TEMP ADJ BLDA: 31.8 MMHG (ref 32–48)
PCO2 TEMP ADJ BLDA: 33.8 MMHG (ref 32–48)
PCO2 TEMP ADJ BLDA: 44.9 MMHG (ref 32–48)
PCO2 TEMP ADJ BLDA: 45.4 MMHG (ref 32–48)
PEEP END EXPIRATORY PRESSURE IPEEP: 8 CMH20
PERCENT MINUTE VOLUME IPMV: 100
PERCENT MINUTE VOLUME IPMV: 130
PERCENT MINUTE VOLUME IPMV: 160
PH BLDA: 7.32 [PH] (ref 7.35–7.45)
PH BLDA: 7.32 [PH] (ref 7.35–7.45)
PH BLDA: 7.33 [PH] (ref 7.35–7.45)
PH BLDA: 7.33 [PH] (ref 7.35–7.45)
PH BLDA: 7.35 [PH] (ref 7.35–7.45)
PH BLDA: 7.37 [PH] (ref 7.35–7.45)
PH BLDA: 7.37 [PH] (ref 7.35–7.45)
PH BLDA: 7.41 [PH] (ref 7.35–7.45)
PH TEMP ADJ BLDA: 7.33 [PH] (ref 7.35–7.45)
PH TEMP ADJ BLDA: 7.33 [PH] (ref 7.35–7.45)
PH TEMP ADJ BLDA: 7.34 [PH] (ref 7.35–7.45)
PH TEMP ADJ BLDA: 7.36 [PH] (ref 7.35–7.45)
PH TEMP ADJ BLDA: 7.37 [PH] (ref 7.35–7.45)
PH TEMP ADJ BLDA: 7.4 [PH] (ref 7.35–7.45)
PLATELET # BLD AUTO: 184 K/UL (ref 164–446)
PO2 BLDA: 127 MMHG (ref 83–108)
PO2 BLDA: 245 MMHG (ref 83–108)
PO2 BLDA: 270 MMHG (ref 83–108)
PO2 BLDA: 301 MMHG (ref 83–108)
PO2 BLDA: 81 MMHG (ref 83–108)
PO2 BLDA: 86 MMHG (ref 83–108)
PO2 BLDA: 90 MMHG (ref 83–108)
PO2 BLDA: 98 MMHG (ref 83–108)
PO2 TEMP ADJ BLDA: 124 MMHG (ref 64–87)
PO2 TEMP ADJ BLDA: 241 MMHG (ref 64–87)
PO2 TEMP ADJ BLDA: 269 MMHG (ref 64–87)
PO2 TEMP ADJ BLDA: 289 MMHG (ref 64–87)
PO2 TEMP ADJ BLDA: 77 MMHG (ref 64–87)
PO2 TEMP ADJ BLDA: 89 MMHG (ref 64–87)
PO2 TEMP ADJ BLDA: 91 MMHG (ref 64–87)
PO2 TEMP ADJ BLDA: 92 MMHG (ref 64–87)
POTASSIUM BLD-SCNC: 4.4 MMOL/L (ref 3.6–5.5)
POTASSIUM BLD-SCNC: 5.5 MMOL/L (ref 3.6–5.5)
POTASSIUM BLD-SCNC: 5.9 MMOL/L (ref 3.6–5.5)
POTASSIUM SERPL-SCNC: 4.4 MMOL/L (ref 3.6–5.5)
POTASSIUM SERPL-SCNC: 4.5 MMOL/L (ref 3.6–5.5)
PRESSURE SUPPORT SETTING VENT: 5 CM[H2O]
PRODUCT TYPE UPROD: NORMAL
PROTHROMBIN TIME: 19.8 SEC (ref 12–14.6)
PROTHROMBIN TIME: 20.2 SEC (ref 12–14.6)
SAO2 % BLDA: 100 % (ref 93–99)
SAO2 % BLDA: 95 % (ref 93–99)
SAO2 % BLDA: 97 % (ref 93–99)
SAO2 % BLDA: 99 % (ref 93–99)
SODIUM BLD-SCNC: 136 MMOL/L (ref 135–145)
SODIUM BLD-SCNC: 137 MMOL/L (ref 135–145)
SODIUM BLD-SCNC: 139 MMOL/L (ref 135–145)
SPECIMEN DRAWN FROM PATIENT: ABNORMAL
UNIT STATUS USTAT: NORMAL

## 2024-12-20 PROCEDURE — 82330 ASSAY OF CALCIUM: CPT | Mod: 91

## 2024-12-20 PROCEDURE — 93005 ELECTROCARDIOGRAM TRACING: CPT | Mod: TC

## 2024-12-20 PROCEDURE — 84295 ASSAY OF SERUM SODIUM: CPT | Mod: 91

## 2024-12-20 PROCEDURE — 82962 GLUCOSE BLOOD TEST: CPT | Mod: 91

## 2024-12-20 PROCEDURE — 33863 ASCENDING AORTIC GRAFT: CPT | Mod: AS

## 2024-12-20 PROCEDURE — 02RF08Z REPLACEMENT OF AORTIC VALVE WITH ZOOPLASTIC TISSUE, OPEN APPROACH: ICD-10-PCS | Performed by: THORACIC SURGERY (CARDIOTHORACIC VASCULAR SURGERY)

## 2024-12-20 PROCEDURE — 700101 HCHG RX REV CODE 250: Performed by: THORACIC SURGERY (CARDIOTHORACIC VASCULAR SURGERY)

## 2024-12-20 PROCEDURE — 85384 FIBRINOGEN ACTIVITY: CPT

## 2024-12-20 PROCEDURE — 83735 ASSAY OF MAGNESIUM: CPT

## 2024-12-20 PROCEDURE — 700101 HCHG RX REV CODE 250

## 2024-12-20 PROCEDURE — C1889 IMPLANT/INSERT DEVICE, NOC: HCPCS | Performed by: THORACIC SURGERY (CARDIOTHORACIC VASCULAR SURGERY)

## 2024-12-20 PROCEDURE — 94799 UNLISTED PULMONARY SVC/PX: CPT

## 2024-12-20 PROCEDURE — 700102 HCHG RX REV CODE 250 W/ 637 OVERRIDE(OP)

## 2024-12-20 PROCEDURE — 503001 HCHG PERFUSION: Performed by: THORACIC SURGERY (CARDIOTHORACIC VASCULAR SURGERY)

## 2024-12-20 PROCEDURE — 85610 PROTHROMBIN TIME: CPT | Mod: 91

## 2024-12-20 PROCEDURE — P9012 CRYOPRECIPITATE EACH UNIT: HCPCS

## 2024-12-20 PROCEDURE — 36415 COLL VENOUS BLD VENIPUNCTURE: CPT

## 2024-12-20 PROCEDURE — 86923 COMPATIBILITY TEST ELECTRIC: CPT | Mod: 91

## 2024-12-20 PROCEDURE — C1751 CATH, INF, PER/CENT/MIDLINE: HCPCS | Performed by: THORACIC SURGERY (CARDIOTHORACIC VASCULAR SURGERY)

## 2024-12-20 PROCEDURE — 84132 ASSAY OF SERUM POTASSIUM: CPT | Mod: 91

## 2024-12-20 PROCEDURE — P9034 PLATELETS, PHERESIS: HCPCS | Mod: 91

## 2024-12-20 PROCEDURE — 71045 X-RAY EXAM CHEST 1 VIEW: CPT

## 2024-12-20 PROCEDURE — A9270 NON-COVERED ITEM OR SERVICE: HCPCS

## 2024-12-20 PROCEDURE — 82803 BLOOD GASES ANY COMBINATION: CPT | Mod: 91

## 2024-12-20 PROCEDURE — A9270 NON-COVERED ITEM OR SERVICE: HCPCS | Performed by: THORACIC SURGERY (CARDIOTHORACIC VASCULAR SURGERY)

## 2024-12-20 PROCEDURE — 36430 TRANSFUSION BLD/BLD COMPNT: CPT

## 2024-12-20 PROCEDURE — 99291 CRITICAL CARE FIRST HOUR: CPT | Performed by: INTERNAL MEDICINE

## 2024-12-20 PROCEDURE — P9017 PLASMA 1 DONOR FRZ W/IN 8 HR: HCPCS

## 2024-12-20 PROCEDURE — 160042 HCHG SURGERY MINUTES - EA ADDL 1 MIN LEVEL 5: Performed by: THORACIC SURGERY (CARDIOTHORACIC VASCULAR SURGERY)

## 2024-12-20 PROCEDURE — 700105 HCHG RX REV CODE 258: Performed by: ANESTHESIOLOGY

## 2024-12-20 PROCEDURE — C1729 CATH, DRAINAGE: HCPCS | Performed by: THORACIC SURGERY (CARDIOTHORACIC VASCULAR SURGERY)

## 2024-12-20 PROCEDURE — 700111 HCHG RX REV CODE 636 W/ 250 OVERRIDE (IP)

## 2024-12-20 PROCEDURE — 700102 HCHG RX REV CODE 250 W/ 637 OVERRIDE(OP): Performed by: THORACIC SURGERY (CARDIOTHORACIC VASCULAR SURGERY)

## 2024-12-20 PROCEDURE — 5A1221Z PERFORMANCE OF CARDIAC OUTPUT, CONTINUOUS: ICD-10-PCS | Performed by: THORACIC SURGERY (CARDIOTHORACIC VASCULAR SURGERY)

## 2024-12-20 PROCEDURE — 94002 VENT MGMT INPAT INIT DAY: CPT

## 2024-12-20 PROCEDURE — 700102 HCHG RX REV CODE 250 W/ 637 OVERRIDE(OP): Performed by: ANESTHESIOLOGY

## 2024-12-20 PROCEDURE — P9016 RBC LEUKOCYTES REDUCED: HCPCS | Mod: 91

## 2024-12-20 PROCEDURE — 37799 UNLISTED PX VASCULAR SURGERY: CPT

## 2024-12-20 PROCEDURE — 700111 HCHG RX REV CODE 636 W/ 250 OVERRIDE (IP): Mod: JZ | Performed by: THORACIC SURGERY (CARDIOTHORACIC VASCULAR SURGERY)

## 2024-12-20 PROCEDURE — 700111 HCHG RX REV CODE 636 W/ 250 OVERRIDE (IP): Mod: JZ

## 2024-12-20 PROCEDURE — 700101 HCHG RX REV CODE 250: Performed by: ANESTHESIOLOGY

## 2024-12-20 PROCEDURE — C1713 ANCHOR/SCREW BN/BN,TIS/BN: HCPCS | Performed by: THORACIC SURGERY (CARDIOTHORACIC VASCULAR SURGERY)

## 2024-12-20 PROCEDURE — P9047 ALBUMIN (HUMAN), 25%, 50ML: HCPCS | Mod: JG

## 2024-12-20 PROCEDURE — 94150 VITAL CAPACITY TEST: CPT

## 2024-12-20 PROCEDURE — 88311 DECALCIFY TISSUE: CPT

## 2024-12-20 PROCEDURE — 93319 3D ECHO IMG CGEN CAR ANOMAL: CPT

## 2024-12-20 PROCEDURE — 85049 AUTOMATED PLATELET COUNT: CPT

## 2024-12-20 PROCEDURE — 99292 CRITICAL CARE ADDL 30 MIN: CPT | Performed by: INTERNAL MEDICINE

## 2024-12-20 PROCEDURE — 88305 TISSUE EXAM BY PATHOLOGIST: CPT

## 2024-12-20 PROCEDURE — 94003 VENT MGMT INPAT SUBQ DAY: CPT

## 2024-12-20 PROCEDURE — 86901 BLOOD TYPING SEROLOGIC RH(D): CPT

## 2024-12-20 PROCEDURE — 700105 HCHG RX REV CODE 258: Performed by: THORACIC SURGERY (CARDIOTHORACIC VASCULAR SURGERY)

## 2024-12-20 PROCEDURE — 88304 TISSUE EXAM BY PATHOLOGIST: CPT

## 2024-12-20 PROCEDURE — 85347 COAGULATION TIME ACTIVATED: CPT | Mod: 91

## 2024-12-20 PROCEDURE — 770022 HCHG ROOM/CARE - ICU (200)

## 2024-12-20 PROCEDURE — 160009 HCHG ANES TIME/MIN: Performed by: THORACIC SURGERY (CARDIOTHORACIC VASCULAR SURGERY)

## 2024-12-20 PROCEDURE — B24BZZ4 ULTRASONOGRAPHY OF HEART WITH AORTA, TRANSESOPHAGEAL: ICD-10-PCS | Performed by: THORACIC SURGERY (CARDIOTHORACIC VASCULAR SURGERY)

## 2024-12-20 PROCEDURE — C1898 LEAD, PMKR, OTHER THAN TRANS: HCPCS | Performed by: THORACIC SURGERY (CARDIOTHORACIC VASCULAR SURGERY)

## 2024-12-20 PROCEDURE — 85014 HEMATOCRIT: CPT | Mod: 91

## 2024-12-20 PROCEDURE — 85018 HEMOGLOBIN: CPT | Mod: 91

## 2024-12-20 PROCEDURE — 160031 HCHG SURGERY MINUTES - 1ST 30 MINS LEVEL 5: Performed by: THORACIC SURGERY (CARDIOTHORACIC VASCULAR SURGERY)

## 2024-12-20 PROCEDURE — 5A2204Z RESTORATION OF CARDIAC RHYTHM, SINGLE: ICD-10-PCS | Performed by: THORACIC SURGERY (CARDIOTHORACIC VASCULAR SURGERY)

## 2024-12-20 PROCEDURE — 85730 THROMBOPLASTIN TIME PARTIAL: CPT

## 2024-12-20 PROCEDURE — 33863 ASCENDING AORTIC GRAFT: CPT | Performed by: THORACIC SURGERY (CARDIOTHORACIC VASCULAR SURGERY)

## 2024-12-20 PROCEDURE — 700105 HCHG RX REV CODE 258

## 2024-12-20 PROCEDURE — 02RX0JZ REPLACEMENT OF THORACIC AORTA, ASCENDING/ARCH WITH SYNTHETIC SUBSTITUTE, OPEN APPROACH: ICD-10-PCS | Performed by: THORACIC SURGERY (CARDIOTHORACIC VASCULAR SURGERY)

## 2024-12-20 PROCEDURE — C1894 INTRO/SHEATH, NON-LASER: HCPCS | Performed by: THORACIC SURGERY (CARDIOTHORACIC VASCULAR SURGERY)

## 2024-12-20 PROCEDURE — 700111 HCHG RX REV CODE 636 W/ 250 OVERRIDE (IP): Mod: JZ,JG | Performed by: NURSE PRACTITIONER

## 2024-12-20 PROCEDURE — 83605 ASSAY OF LACTIC ACID: CPT | Mod: 91

## 2024-12-20 PROCEDURE — 86900 BLOOD TYPING SEROLOGIC ABO: CPT

## 2024-12-20 PROCEDURE — 30233R1 TRANSFUSION OF NONAUTOLOGOUS PLATELETS INTO PERIPHERAL VEIN, PERCUTANEOUS APPROACH: ICD-10-PCS | Performed by: THORACIC SURGERY (CARDIOTHORACIC VASCULAR SURGERY)

## 2024-12-20 PROCEDURE — 93010 ELECTROCARDIOGRAM REPORT: CPT | Mod: 59 | Performed by: INTERNAL MEDICINE

## 2024-12-20 PROCEDURE — 700111 HCHG RX REV CODE 636 W/ 250 OVERRIDE (IP): Performed by: ANESTHESIOLOGY

## 2024-12-20 PROCEDURE — 160048 HCHG OR STATISTICAL LEVEL 1-5: Performed by: THORACIC SURGERY (CARDIOTHORACIC VASCULAR SURGERY)

## 2024-12-20 DEVICE — VALVE KONECT RESILIA CONDUIT 27MM (1/EA): Type: IMPLANTABLE DEVICE | Site: CHEST | Status: FUNCTIONAL

## 2024-12-20 DEVICE — MARKER GRAFT AC VEIN DISK SHAPED (10EA/BX): Type: IMPLANTABLE DEVICE | Site: HEART | Status: FUNCTIONAL

## 2024-12-20 DEVICE — BONE PUTTY HEMOSTATIC ABSORBABLE (12/BX): Type: IMPLANTABLE DEVICE | Site: CHEST | Status: FUNCTIONAL

## 2024-12-20 RX ORDER — MORPHINE SULFATE 4 MG/ML
4 INJECTION INTRAVENOUS
Status: DISCONTINUED | OUTPATIENT
Start: 2024-12-20 | End: 2024-12-20

## 2024-12-20 RX ORDER — EPINEPHRINE HCL IN 0.9 % NACL 4MG/250ML
0-.5 PLASTIC BAG, INJECTION (ML) INTRAVENOUS CONTINUOUS
Status: DISCONTINUED | OUTPATIENT
Start: 2024-12-20 | End: 2024-12-21

## 2024-12-20 RX ORDER — ASPIRIN 81 MG/1
81 TABLET ORAL DAILY
Status: DISCONTINUED | OUTPATIENT
Start: 2024-12-21 | End: 2024-12-25 | Stop reason: HOSPADM

## 2024-12-20 RX ORDER — CEFAZOLIN SODIUM 1 G/3ML
INJECTION, POWDER, FOR SOLUTION INTRAMUSCULAR; INTRAVENOUS PRN
Status: DISCONTINUED | OUTPATIENT
Start: 2024-12-20 | End: 2024-12-20 | Stop reason: SURG

## 2024-12-20 RX ORDER — BISACODYL 10 MG
10 SUPPOSITORY, RECTAL RECTAL
Status: DISCONTINUED | OUTPATIENT
Start: 2024-12-20 | End: 2024-12-25 | Stop reason: HOSPADM

## 2024-12-20 RX ORDER — POLYETHYLENE GLYCOL 3350 17 G/17G
1 POWDER, FOR SOLUTION ORAL DAILY
Status: DISCONTINUED | OUTPATIENT
Start: 2024-12-21 | End: 2024-12-25 | Stop reason: HOSPADM

## 2024-12-20 RX ORDER — INSULIN LISPRO 100 [IU]/ML
0-14 INJECTION, SOLUTION INTRAVENOUS; SUBCUTANEOUS
Status: ACTIVE | OUTPATIENT
Start: 2024-12-20 | End: 2024-12-21

## 2024-12-20 RX ORDER — MIDAZOLAM HYDROCHLORIDE 1 MG/ML
INJECTION INTRAMUSCULAR; INTRAVENOUS PRN
Status: DISCONTINUED | OUTPATIENT
Start: 2024-12-20 | End: 2024-12-20 | Stop reason: SURG

## 2024-12-20 RX ORDER — DEXAMETHASONE SODIUM PHOSPHATE 4 MG/ML
INJECTION, SOLUTION INTRA-ARTICULAR; INTRALESIONAL; INTRAMUSCULAR; INTRAVENOUS; SOFT TISSUE PRN
Status: DISCONTINUED | OUTPATIENT
Start: 2024-12-20 | End: 2024-12-20 | Stop reason: SURG

## 2024-12-20 RX ORDER — ALUMINA, MAGNESIA, AND SIMETHICONE 2400; 2400; 240 MG/30ML; MG/30ML; MG/30ML
30 SUSPENSION ORAL EVERY 4 HOURS PRN
Status: DISCONTINUED | OUTPATIENT
Start: 2024-12-20 | End: 2024-12-25 | Stop reason: HOSPADM

## 2024-12-20 RX ORDER — TRAMADOL HYDROCHLORIDE 50 MG/1
50 TABLET ORAL EVERY 4 HOURS PRN
Status: DISCONTINUED | OUTPATIENT
Start: 2024-12-20 | End: 2024-12-21

## 2024-12-20 RX ORDER — DEXMEDETOMIDINE HYDROCHLORIDE 4 UG/ML
0-1.5 INJECTION, SOLUTION INTRAVENOUS CONTINUOUS
Status: DISCONTINUED | OUTPATIENT
Start: 2024-12-20 | End: 2024-12-20

## 2024-12-20 RX ORDER — OXYCODONE HYDROCHLORIDE 10 MG/1
10 TABLET ORAL
Status: DISCONTINUED | OUTPATIENT
Start: 2024-12-20 | End: 2024-12-25 | Stop reason: HOSPADM

## 2024-12-20 RX ORDER — DEXTROSE MONOHYDRATE 25 G/50ML
12.5-25 INJECTION, SOLUTION INTRAVENOUS PRN
Status: DISCONTINUED | OUTPATIENT
Start: 2024-12-20 | End: 2024-12-21

## 2024-12-20 RX ORDER — ACETAMINOPHEN 500 MG
1000 TABLET ORAL EVERY 6 HOURS
Status: DISCONTINUED | OUTPATIENT
Start: 2024-12-20 | End: 2024-12-25 | Stop reason: HOSPADM

## 2024-12-20 RX ORDER — HEPARIN SODIUM,PORCINE 1000/ML
VIAL (ML) INJECTION PRN
Status: DISCONTINUED | OUTPATIENT
Start: 2024-12-20 | End: 2024-12-20 | Stop reason: SURG

## 2024-12-20 RX ORDER — ACETAMINOPHEN 325 MG/1
650 TABLET ORAL EVERY 4 HOURS PRN
Status: DISCONTINUED | OUTPATIENT
Start: 2024-12-20 | End: 2024-12-25 | Stop reason: HOSPADM

## 2024-12-20 RX ORDER — NOREPINEPHRINE BITARTRATE 0.03 MG/ML
0-1 INJECTION, SOLUTION INTRAVENOUS CONTINUOUS
Status: DISCONTINUED | OUTPATIENT
Start: 2024-12-20 | End: 2024-12-22

## 2024-12-20 RX ORDER — ONDANSETRON 2 MG/ML
8 INJECTION INTRAMUSCULAR; INTRAVENOUS EVERY 6 HOURS PRN
Status: DISCONTINUED | OUTPATIENT
Start: 2024-12-20 | End: 2024-12-25 | Stop reason: HOSPADM

## 2024-12-20 RX ORDER — AMIODARONE HYDROCHLORIDE 150 MG/3ML
INJECTION, SOLUTION INTRAVENOUS PRN
Status: DISCONTINUED | OUTPATIENT
Start: 2024-12-20 | End: 2024-12-20 | Stop reason: SURG

## 2024-12-20 RX ORDER — ACETAMINOPHEN 650 MG/1
650 SUPPOSITORY RECTAL EVERY 4 HOURS PRN
Status: DISCONTINUED | OUTPATIENT
Start: 2024-12-20 | End: 2024-12-25 | Stop reason: HOSPADM

## 2024-12-20 RX ORDER — SODIUM CHLORIDE 9 MG/ML
INJECTION, SOLUTION INTRAVENOUS CONTINUOUS
Status: DISCONTINUED | OUTPATIENT
Start: 2024-12-20 | End: 2024-12-21

## 2024-12-20 RX ORDER — AMOXICILLIN 250 MG
2 CAPSULE ORAL 2 TIMES DAILY
Status: DISCONTINUED | OUTPATIENT
Start: 2024-12-20 | End: 2024-12-25 | Stop reason: HOSPADM

## 2024-12-20 RX ORDER — EPINEPHRINE HCL IN 0.9 % NACL 4MG/250ML
0-.5 PLASTIC BAG, INJECTION (ML) INTRAVENOUS CONTINUOUS
Status: DISCONTINUED | OUTPATIENT
Start: 2024-12-20 | End: 2024-12-20

## 2024-12-20 RX ORDER — METOPROLOL TARTRATE 25 MG/1
25 TABLET, FILM COATED ORAL 2 TIMES DAILY
Status: DISCONTINUED | OUTPATIENT
Start: 2024-12-22 | End: 2024-12-23

## 2024-12-20 RX ORDER — ACETAMINOPHEN 500 MG
1000 TABLET ORAL EVERY 6 HOURS PRN
Status: DISCONTINUED | OUTPATIENT
Start: 2024-12-30 | End: 2024-12-25 | Stop reason: HOSPADM

## 2024-12-20 RX ORDER — ENOXAPARIN SODIUM 100 MG/ML
40 INJECTION SUBCUTANEOUS DAILY
Status: DISCONTINUED | OUTPATIENT
Start: 2024-12-21 | End: 2024-12-25 | Stop reason: HOSPADM

## 2024-12-20 RX ORDER — ACETAMINOPHEN 500 MG
1000 TABLET ORAL ONCE
Status: COMPLETED | OUTPATIENT
Start: 2024-12-20 | End: 2024-12-20

## 2024-12-20 RX ORDER — MIDAZOLAM HYDROCHLORIDE 1 MG/ML
2 INJECTION INTRAMUSCULAR; INTRAVENOUS
Status: DISCONTINUED | OUTPATIENT
Start: 2024-12-20 | End: 2024-12-20

## 2024-12-20 RX ORDER — MAGNESIUM SULFATE 1 G/100ML
1 INJECTION INTRAVENOUS DAILY
Status: COMPLETED | OUTPATIENT
Start: 2024-12-20 | End: 2024-12-22

## 2024-12-20 RX ORDER — MAGNESIUM SULFATE HEPTAHYDRATE 40 MG/ML
INJECTION, SOLUTION INTRAVENOUS PRN
Status: DISCONTINUED | OUTPATIENT
Start: 2024-12-20 | End: 2024-12-20 | Stop reason: SURG

## 2024-12-20 RX ORDER — METOPROLOL TARTRATE 25 MG/1
12.5 TABLET, FILM COATED ORAL 2 TIMES DAILY
Status: ACTIVE | OUTPATIENT
Start: 2024-12-21 | End: 2024-12-22

## 2024-12-20 RX ORDER — OXYCODONE HYDROCHLORIDE 5 MG/1
5 TABLET ORAL
Status: DISCONTINUED | OUTPATIENT
Start: 2024-12-20 | End: 2024-12-25 | Stop reason: HOSPADM

## 2024-12-20 RX ORDER — NOREPINEPHRINE BITARTRATE 0.03 MG/ML
0-1 INJECTION, SOLUTION INTRAVENOUS CONTINUOUS
Status: DISCONTINUED | OUTPATIENT
Start: 2024-12-20 | End: 2024-12-20

## 2024-12-20 RX ORDER — LIDOCAINE HYDROCHLORIDE 20 MG/ML
INJECTION, SOLUTION EPIDURAL; INFILTRATION; INTRACAUDAL; PERINEURAL PRN
Status: DISCONTINUED | OUTPATIENT
Start: 2024-12-20 | End: 2024-12-20 | Stop reason: SURG

## 2024-12-20 RX ORDER — SODIUM CHLORIDE, SODIUM LACTATE, POTASSIUM CHLORIDE, AND CALCIUM CHLORIDE .6; .31; .03; .02 G/100ML; G/100ML; G/100ML; G/100ML
2000 INJECTION, SOLUTION INTRAVENOUS PRN
Status: ACTIVE | OUTPATIENT
Start: 2024-12-20 | End: 2024-12-21

## 2024-12-20 RX ORDER — METOPROLOL TARTRATE 25 MG/1
12.5 TABLET, FILM COATED ORAL ONCE
Status: DISCONTINUED | OUTPATIENT
Start: 2024-12-20 | End: 2024-12-20 | Stop reason: HOSPADM

## 2024-12-20 RX ORDER — ROSUVASTATIN CALCIUM 10 MG/1
10 TABLET, COATED ORAL EVERY EVENING
Status: DISCONTINUED | OUTPATIENT
Start: 2024-12-20 | End: 2024-12-25 | Stop reason: HOSPADM

## 2024-12-20 RX ORDER — VANCOMYCIN HYDROCHLORIDE 500 MG/10ML
INJECTION, POWDER, LYOPHILIZED, FOR SOLUTION INTRAVENOUS
Status: COMPLETED | OUTPATIENT
Start: 2024-12-20 | End: 2024-12-20

## 2024-12-20 RX ORDER — NITROGLYCERIN 20 MG/100ML
0-100 INJECTION INTRAVENOUS CONTINUOUS
Status: DISCONTINUED | OUTPATIENT
Start: 2024-12-20 | End: 2024-12-21

## 2024-12-20 RX ORDER — CALCIUM CHLORIDE 100 MG/ML
1 INJECTION INTRAVENOUS; INTRAVENTRICULAR ONCE
Status: COMPLETED | OUTPATIENT
Start: 2024-12-20 | End: 2024-12-20

## 2024-12-20 RX ORDER — PROTAMINE SULFATE 10 MG/ML
INJECTION, SOLUTION INTRAVENOUS PRN
Status: DISCONTINUED | OUTPATIENT
Start: 2024-12-20 | End: 2024-12-20 | Stop reason: SURG

## 2024-12-20 RX ORDER — PROCHLORPERAZINE EDISYLATE 5 MG/ML
10 INJECTION INTRAMUSCULAR; INTRAVENOUS EVERY 6 HOURS PRN
Status: DISCONTINUED | OUTPATIENT
Start: 2024-12-20 | End: 2024-12-25 | Stop reason: HOSPADM

## 2024-12-20 RX ORDER — DEXMEDETOMIDINE HYDROCHLORIDE 4 UG/ML
0-1.5 INJECTION, SOLUTION INTRAVENOUS CONTINUOUS
Status: DISCONTINUED | OUTPATIENT
Start: 2024-12-20 | End: 2024-12-21

## 2024-12-20 RX ADMIN — MIDAZOLAM HYDROCHLORIDE 1 MG: 1 INJECTION, SOLUTION INTRAMUSCULAR; INTRAVENOUS at 07:47

## 2024-12-20 RX ADMIN — CEFAZOLIN 2 G: 2 INJECTION, POWDER, FOR SOLUTION INTRAMUSCULAR; INTRAVENOUS at 20:18

## 2024-12-20 RX ADMIN — PROPOFOL 140 MG: 10 INJECTION, EMULSION INTRAVENOUS at 07:37

## 2024-12-20 RX ADMIN — ACETAMINOPHEN 1000 MG: 500 TABLET ORAL at 23:50

## 2024-12-20 RX ADMIN — MAGNESIUM SULFATE IN DEXTROSE 1 G: 10 INJECTION, SOLUTION INTRAVENOUS at 16:14

## 2024-12-20 RX ADMIN — MIDAZOLAM HYDROCHLORIDE 2 MG: 1 INJECTION, SOLUTION INTRAMUSCULAR; INTRAVENOUS at 19:11

## 2024-12-20 RX ADMIN — SODIUM CHLORIDE 1 UNITS/HR: 9 INJECTION, SOLUTION INTRAVENOUS at 08:01

## 2024-12-20 RX ADMIN — DEXMEDETOMIDINE HYDROCHLORIDE 0.5 MCG/KG/HR: 100 INJECTION, SOLUTION INTRAVENOUS at 14:00

## 2024-12-20 RX ADMIN — VASOPRESSIN 0.03 UNITS/MIN: 20 INJECTION INTRAVENOUS at 14:07

## 2024-12-20 RX ADMIN — COAGULATION FACTOR VIIA (RECOMBINANT) 1000 MCG: KIT at 16:32

## 2024-12-20 RX ADMIN — SODIUM CHLORIDE 4 UNITS/HR: 9 INJECTION, SOLUTION INTRAVENOUS at 09:03

## 2024-12-20 RX ADMIN — AMINOCAPROIC ACID 5 G: 250 INJECTION, SOLUTION INTRAVENOUS at 07:42

## 2024-12-20 RX ADMIN — PROTAMINE SULFATE 450 MG: 10 INJECTION, SOLUTION INTRAVENOUS at 11:30

## 2024-12-20 RX ADMIN — ONDANSETRON 8 MG: 2 INJECTION INTRAMUSCULAR; INTRAVENOUS at 23:49

## 2024-12-20 RX ADMIN — NOREPINEPHRINE BITARTRATE 0.02 MCG/MIN: 1 INJECTION, SOLUTION, CONCENTRATE INTRAVENOUS at 10:43

## 2024-12-20 RX ADMIN — AMIODARONE HYDROCHLORIDE 300 MG: 50 INJECTION, SOLUTION INTRAVENOUS at 11:00

## 2024-12-20 RX ADMIN — EPINEPHRINE 0.02 MCG/KG/MIN: 1 INJECTION INTRAMUSCULAR; INTRAVENOUS; SUBCUTANEOUS at 11:10

## 2024-12-20 RX ADMIN — DEXAMETHASONE SODIUM PHOSPHATE 4 MG: 4 INJECTION INTRA-ARTICULAR; INTRALESIONAL; INTRAMUSCULAR; INTRAVENOUS; SOFT TISSUE at 09:31

## 2024-12-20 RX ADMIN — LIDOCAINE HYDROCHLORIDE 100 MG: 20 INJECTION, SOLUTION EPIDURAL; INFILTRATION; INTRACAUDAL; PERINEURAL at 11:06

## 2024-12-20 RX ADMIN — OXYCODONE 5 MG: 5 TABLET ORAL at 23:51

## 2024-12-20 RX ADMIN — CALCIUM CHLORIDE 1 G: 100 INJECTION, SOLUTION INTRAVENOUS at 16:09

## 2024-12-20 RX ADMIN — FENTANYL CITRATE 100 MCG: 50 INJECTION, SOLUTION INTRAMUSCULAR; INTRAVENOUS at 07:36

## 2024-12-20 RX ADMIN — ROCURONIUM BROMIDE 100 MG: 10 INJECTION, SOLUTION INTRAVENOUS at 07:37

## 2024-12-20 RX ADMIN — ROCURONIUM BROMIDE 100 MG: 10 INJECTION, SOLUTION INTRAVENOUS at 08:59

## 2024-12-20 RX ADMIN — ACETAMINOPHEN 1000 MG: 500 TABLET ORAL at 06:34

## 2024-12-20 RX ADMIN — CEFAZOLIN 2 G: 1 INJECTION, POWDER, FOR SOLUTION INTRAMUSCULAR; INTRAVENOUS at 11:43

## 2024-12-20 RX ADMIN — HEPARIN SODIUM 35000 UNITS: 1000 INJECTION, SOLUTION INTRAVENOUS; SUBCUTANEOUS at 08:36

## 2024-12-20 RX ADMIN — MIDAZOLAM HYDROCHLORIDE 1 MG: 1 INJECTION, SOLUTION INTRAMUSCULAR; INTRAVENOUS at 07:38

## 2024-12-20 RX ADMIN — NOREPINEPHRINE BITARTRATE 0.3 MCG/KG/MIN: 1 INJECTION, SOLUTION, CONCENTRATE INTRAVENOUS at 18:08

## 2024-12-20 RX ADMIN — INSULIN HUMAN 2.5 UNITS/HR: 1 INJECTION, SOLUTION INTRAVENOUS at 14:37

## 2024-12-20 RX ADMIN — CEFAZOLIN 2 G: 1 INJECTION, POWDER, FOR SOLUTION INTRAMUSCULAR; INTRAVENOUS at 07:50

## 2024-12-20 RX ADMIN — MAGNESIUM SULFATE HEPTAHYDRATE 2 G: 4 INJECTION, SOLUTION INTRAVENOUS at 11:01

## 2024-12-20 RX ADMIN — MIDAZOLAM HYDROCHLORIDE 2 MG: 1 INJECTION, SOLUTION INTRAMUSCULAR; INTRAVENOUS at 21:48

## 2024-12-20 RX ADMIN — Medication 1 APPLICATOR: at 15:00

## 2024-12-20 RX ADMIN — Medication 1 APPLICATOR: at 20:07

## 2024-12-20 RX ADMIN — SODIUM CHLORIDE: 9 INJECTION, SOLUTION INTRAVENOUS at 22:49

## 2024-12-20 ASSESSMENT — COPD QUESTIONNAIRES
COPD SCREENING SCORE: 2
DURING THE PAST 4 WEEKS HOW MUCH DID YOU FEEL SHORT OF BREATH: NONE/LITTLE OF THE TIME
HAVE YOU SMOKED AT LEAST 100 CIGARETTES IN YOUR ENTIRE LIFE: NO/DON'T KNOW
DO YOU EVER COUGH UP ANY MUCUS OR PHLEGM?: NO/ONLY WITH OCCASIONAL COLDS OR INFECTIONS

## 2024-12-20 ASSESSMENT — PULMONARY FUNCTION TESTS: FVC: 1.2

## 2024-12-20 ASSESSMENT — FIBROSIS 4 INDEX: FIB4 SCORE: 1.97

## 2024-12-20 ASSESSMENT — PAIN DESCRIPTION - PAIN TYPE: TYPE: ACUTE PAIN;SURGICAL PAIN

## 2024-12-20 NOTE — ANESTHESIA PROCEDURE NOTES
PEDRO    Date/Time: 12/20/2024 7:47 AM    Performed by: Jeffry Pandey M.D.  Authorized by: Jeffry Pandey M.D.    Start Time:12/20/2024 7:47 AM  Preanesthetic Checklist: patient identified, IV checked, site marked, risks and benefits discussed, surgical consent, monitors and equipment checked, pre-op evaluation and timeout performed    Indication for PEDRO: diagnostic   Patient Location: OR  Intubated: Yes  Bite Block: Yes  Heart Visualized: Yes  Insertion: atraumatic    **See FULL PEDRO report in patient's chart via CV Synapse**

## 2024-12-20 NOTE — CONSULTS
Critical Care Consultation    Date of consult: 12/20/2024    Referring Physician  Harris Mina M.D.    Reason for Consultation  Post operative ventilator management after AVR with coronary artery reimplantation    History of Presenting Illness  All history was obtained after extensive chest review as well as bedside handoff from anesthesiology, Dr. Pandey, as the patient was still intubated and sedated at the time of my evaluation.    Mr. Epperson is a 69 y.o. male with the past medical history significant for hypertension, hyperlipidemia, aortic stenosis due to bicuspid aortic valve, and ascending aortic enlargement who was sent to cardiothoracic, Dr. Mina, for consideration of valve replacement due to worsening dizziness with position changes.  The patient is active at baseline.  Cardiothoracic recommended aortic valve replacement with aortic aneurysm repair.  The patient was taken to the operating room on 12/20/2024 where he underwent aortic valve replacement with coronary artery reimplantation.  The surgery itself was uncomplicated; however, when the patient was coming off of pump he was found to have a mild bleed that was repaired by Dr. Rizvi and received 1 unit of platelets due to oozing.  The patient also went into ventricular tachycardia coming off pump requiring 300mg of amiodarone and 100 mg of lidocaine.  He was then 100% paced when he was brought up to the ICU.  While getting settled in the ICU it was noted that his chest tubes had already put out close to 300 cc of bright red blood along with increasing Levophed requirements.  The patient was given boluses of IV fluids as well as 2 units of FFP and cryoprecipitate.  The critical care team was consulted to assist with postoperative management.    Code Status  Full Code    Review of Systems  Review of Systems   Unable to perform ROS: Intubated       Past Medical History   has a past medical history of Bicuspid aortic valve (10/14/2020),  Essential hypertension (10/14/2020), Pure hypercholesterolemia (10/14/2020), and Severe aortic stenosis.    Surgical History   has a past surgical history that includes other (11/2024) and colonoscopy (02/2024).    Family History  family history includes Heart Attack (age of onset: 88) in his mother; Heart Disease in his mother; Heart Disease (age of onset: 65) in his father.    Social History   reports that he has never smoked. He has never used smokeless tobacco. He reports that he does not currently use alcohol after a past usage of about 1.2 oz of alcohol per week. He reports that he does not currently use drugs.    Medications  Home Medications    **Home medications have not yet been reviewed for this encounter**       Audit from Redirected Encounters    **Home medications have not yet been reviewed for this encounter**       Current Facility-Administered Medications   Medication Dose Route Frequency Provider Last Rate Last Admin    Cardiac Surgery Prophylactic Antibiotics per Pharmacy   Other PHARMACY TO DOSE Harris Mina M.D.        lidocaine (Xylocaine) 1 % injection 0.5 mL  0.5 mL Intradermal Once PRN Harris Mina M.D.        insulin regular (HumuLIN R/NovoLIN R) 100 Units in  mL infusion premix  1-6 Units/hr Intravenous Continuous Harris Mina M.D.        EPINEPHrine (Adrenalin) infusion 4 mg/250 mL (premix)  0-0.5 mcg/kg/min (Ideal) Intravenous Continuous Harris Mina M.D.        norepinephrine (Levophed) 8 mg in 250 mL NS infusion (premix)  0-1 mcg/kg/min (Ideal) Intravenous Continuous Harris Mina M.D.        dexmedetomidine (Precedex) 400 mcg/100mL infusion  0-1.5 mcg/kg/hr (Ideal) Intravenous Continuous Harris Mina M.D.        metoprolol tartrate (Lopressor) tablet 12.5 mg  12.5 mg Oral Once Harris Mina M.D.        aminocaproic acid (Amicar) 8.62 g in  mL IV Bolus  100 mg/kg Intravenous Once Harris Mina M.D.        aminocaproic acid (Amicar) 5 g in  mL  Infusion  2 g/hr Intravenous Once Harris Mina M.D.        ceFAZolin (Ancef) 2 g in  mL IVPB  2 g Intravenous Once Harris Mina M.D.         Facility-Administered Medications Ordered in Other Encounters   Medication Dose Route Frequency Provider Last Rate Last Admin    propofol (DIPRIVAN) injection   Intravenous PRN Jeffry Pandey M.D.   140 mg at 12/20/24 0737    rocuronium (Zemuron) injection   Intravenous PRN Jeffry Pandey M.D.   100 mg at 12/20/24 0737       Allergies  No Known Allergies    Vital Signs last 24 hours  Temp:  [36.1 °C (97 °F)] 36.1 °C (97 °F)  Pulse:  [54] 54  Resp:  [14] 14  BP: (146-157)/(81-83) 146/81  SpO2:  [98 %] 98 %    Physical Exam  Physical Exam  Vitals and nursing note reviewed.   Constitutional:       Appearance: He is ill-appearing.   HENT:      Head: Normocephalic and atraumatic.      Right Ear: External ear normal.      Left Ear: External ear normal.      Nose: Nose normal. No rhinorrhea.      Mouth/Throat:      Mouth: Mucous membranes are moist.      Comments: ETT in place  Eyes:      General: No scleral icterus.     Conjunctiva/sclera: Conjunctivae normal.      Pupils: Pupils are equal, round, and reactive to light.   Neck:      Comments: Right IJ TLC  Cardiovascular:      Rate and Rhythm: Normal rate and regular rhythm.      Pulses:           Dorsalis pedis pulses are 1+ on the right side and 1+ on the left side.      Heart sounds: Heart sounds are distant. No murmur heard.  Pulmonary:      Breath sounds: No wheezing.      Comments: Breathing comfortably on the vent, chest tubes in place draining bright red blood, mild coarse breath sounds heard throughout  Chest:      Chest wall: No tenderness.   Abdominal:      Palpations: Abdomen is soft.      Tenderness: There is no abdominal tenderness. There is no guarding.   Genitourinary:     Comments: Zamora in place  Musculoskeletal:      Cervical back: Normal range of motion and neck supple.      Right lower leg: No  edema.      Left lower leg: No edema.   Lymphadenopathy:      Cervical: No cervical adenopathy.   Skin:     General: Skin is warm and dry.      Capillary Refill: Capillary refill takes 2 to 3 seconds.      Findings: No rash.   Neurological:      Comments: Sedated, w/d x 4   Psychiatric:      Comments: Unable to assess         Fluids  No intake or output data in the 24 hours ending 24 0927    Laboratory  Recent Results (from the past 48 hours)   EKG    Collection Time: 24  9:59 AM   Result Value Ref Range    Report       [facility]    Test Date:  2024  Pt Name:    FIFI WILKINSON              Department: [facility]  MRN:        [id number]                      Room:  Gender:                                 Technician: J  :        [id number]                   Requested By:FALLON MINA  Order #:    [id number]                    Reading MD: Irwin Morales MD    Measurements  Intervals                                Axis  Rate:       54                           P:          37  MO:         190                          QRS:        -7  QRSD:       102                          T:          8  QT:         467  QTc:        443    Interpretive Statements  Sinus bradycardia  Left ventricular hypertrophy  Anterior Q waves  Inf ST depressions  Nonspecific ST-T wave changes  Compared to ECG 2024 08:13:16  No significant changes  Electronically Signed On 2024 09:59:56 PST by Irwin Morales MD     ABO Rh Confirm    Collection Time: 24  6:32 AM   Result Value Ref Range    ABO Rh Confirm B NEG        Imaging  CXR(reviewed):  low lung volumes, ETT above the clavicles, mild cephalization    Assessment/Plan  * Severe aortic stenosis- (present on admission)  Assessment & Plan  Due to bicuspid aortic valve  S/p AVR with Dr. Mina on   Hemodynamics per CVS  SBP goals > 90 and < 120  I am actively titrating epinephrine and norepinephrine for SBP goals  Monitor chest tube output for  bleeding  Serial Hb and BMP    Encounter for weaning from ventilator (HCC)  Assessment & Plan  Intubated for AVR on 12/20  Not a rapid weaning candidate due to hemodynamic instability  I am actively adjusting vent settings based on ABGs an vent mechanics  RT/O2 protocols  IS/early mobility once extubated       Acute blood loss anemia- (present on admission)  Assessment & Plan  Due to OR and AVR   S/p 1 unit platelets in OR  Serial Hb/Hct  Will give additional 2 units of FFP and 1 cryo  Transfuse for Hb< 8  Monitor chest tube output     Aneurysm of ascending aorta without rupture (HCC)- (present on admission)  Assessment & Plan  Only 4.4cm and not repaired      Pure hypercholesterolemia- (present on admission)  Assessment & Plan  Cont home crestor 10mg PO daily     Essential hypertension- (present on admission)  Assessment & Plan  Holding BP medications due to hypotension on vasopressors     Bicuspid aortic valve- (present on admission)  Assessment & Plan  S/p AVR on 12/20        Discussed patient condition and risk of morbidity and/or mortality with RN, RT, Pharmacy, Charge nurse / hot rounds, and CVS.      The patient remains critically ill.  I have assessed and reassessed the respiratory status and made ventilator adjustments based upon arterial blood gas analysis, ventilator waveforms and airway mechanics.  I have assessed and reassessed the blood pressure, hemodynamics, cardiovascular status. This patient remains at high risk for worsening cardiopulmonary dysfunction and death without the above critical care interventions.      Critical care time = 108 minutes in directly providing and coordinating critical care and extensive data review.  No time overlap and excludes procedures.

## 2024-12-20 NOTE — H&P
The patient is a 69 y.o. male with past medical history of hypertension, hyperlipidemia, bicuspid aortic valve, ascending aortic enlargement and aortic stenosis. There have been no interval changes to the H&P dated 11/27/24. Plan to proceed with aortic valve replacement, ascending aortic aneurysm repair and intraoperative transesophageal echocardiogram.

## 2024-12-20 NOTE — ANESTHESIA PROCEDURE NOTES
Airway    Date/Time: 12/20/2024 7:38 AM    Performed by: Jeffry Pandey M.D.  Authorized by: Jeffry Pandey M.D.    Location:  OR  Urgency:  Elective  Indications for Airway Management:  Anesthesia      Spontaneous Ventilation: absent    Sedation Level:  Deep  Preoxygenated: Yes    Patient Position:  Sniffing  Final Airway Type:  Endotracheal airway  Final Endotracheal Airway:  ETT  Cuffed: Yes    Technique Used for Successful ETT Placement:  Direct laryngoscopy    Insertion Site:  Oral  Blade Type:  Parul  Laryngoscope Blade/Videolaryngoscope Blade Size:  3  ETT Size (mm):  7.5  Measured from:  Teeth  ETT to Teeth (cm):  20  Placement Verified by: auscultation and capnometry    Cormack-Lehane Classification:  Grade I - full view of glottis  Number of Attempts at Approach:  1

## 2024-12-20 NOTE — ASSESSMENT & PLAN NOTE
Intubated for AVR on 12/20  Extubated later in the evening  RT/O2 protocols  Encouraged IS again, mobilizing as rhythm allows  Room air trial 12/23

## 2024-12-20 NOTE — DIETARY
Nutrition Services: Diet Education Consult   Day 0 of admit.  Lionel Epperson is a 69 y.o. male with admitting DX of Severe aortic stenosis [I35.0]    RD received referral for cardiac diet education. Pt is POD 0 aortic valve replacement, ascending aortic aneurysm repair, aortic root replacement. PMHx includes HTN, HLD. RD provided information via discharge instructions which includes nutrition recommendations and tips to support a heart healhty dietary pattern. This includes outpatient resources to [facility] [place] [facility] for continued nutrition education and guidance as desired.     No other education needs identified at this time. Please re-consult RD for supplemental education or at the request of patient.    Please re-consult RD PRN

## 2024-12-20 NOTE — ANESTHESIA PROCEDURE NOTES
Arterial Line    Performed by: Jeffry Pandey M.D.  Authorized by: Jeffry Pandey M.D.    Start Time:  12/20/2024 7:36 AM  Localization: surface landmarks    Patient Location:  OR  Indication: continuous blood pressure monitoring        Catheter Size:  20 G  Seldinger Technique?: Yes    Laterality:  Left  Site:  Radial artery  Line Secured:  Antimicrobial disc, tape and transparent dressing  Events: patient tolerated procedure well with no complications

## 2024-12-20 NOTE — ASSESSMENT & PLAN NOTE
Due to bicuspid aortic valve  S/p AVR with Dr. Mina on 12/20  Hemodynamics per CVS  V wires per CVS, junctional rhythm 12/23-likely prudent to keep V wires and avoid AV karen blocking agents  Optimize electrolytes  SBP goals > 90 and < 120  S/p vasopressors  Monitor chest tube output for bleeding  Serial Hb and BMP  Needed by junctional rhythm and A-fib RVR

## 2024-12-20 NOTE — OR SURGEON
OPERATIVE REPORT    Operation date: 12/20/2024    Referring physician: Shaggy Roberts MD    PreOp Diagnosis: Severe aortic stenosis, bicuspid aortic valve, ascending aortic aneurysm (4.4 cm), hypertension, dyslipidemia    PostOp Diagnosis: Severe aortic stenosis, bicuspid aortic valve, ascending aortic aneurysm (4.4 cm), hypertension, dyslipidemia    Procedure(s):  AORTIC ROOT REPLACEMENT (Bio-Bentall, 27 mm Konect Rogel valved conduit, coronary artery reimplantation) and intraoperative transesophageal echocardiography    Surgeon(s):  Harris Mina M.D.    Assistant:  Alejandro Nash PA-C    Anesthesiologist/Type of Anesthesia:  Anesthesiologist: Jeffry Pandey M.D.  Perfusionist: Lynne Allen/General    Surgical Staff:  Assistant: KHALIF Berger; Alejandro Nash P.A.-C.  Circulator: Lukas D. Gansert, R.N.  Relief Circulator: Laurie Paul R.N.  Relief Scrub: Queenie Salamanca  Scrub Person: Jannet Hansen    Specimens removed if any:  ID Type Source Tests Collected by Time Destination   A : AORTIC ANEURYSM Tissue Heart PATHOLOGY SPECIMEN Harris Mina M.D. 12/20/2024  9:10 AM    B : AORTIC VALVE Tissue Heart Valve PATHOLOGY SPECIMEN Harris Mina M.D. 12/20/2024  9:15 AM      Estimated Blood Loss: Minimal    Findings: Severe aortic stenosis, bicuspid aortic valve, ascending aortic aneurysm, LVEF approximately 60%    Complications: None    Indications:  Mr. Epperson is a 69-year-old male with severe symptomatic aortic stenosis, a bicuspid aortic valve and a 4.4 cm ascending aortic aneurysm.    Procedure:  The patient was brought to the operating room and placed on the operating room table in the supine position.  After successful induction of general anesthesia endotracheal intubation, the patient was prepped and draped in the usual sterile fashion.  Alejandro Nash PA-C assisted with retraction and exposure during the operation and closed the sternal wound.  Intraoperative  transesophageal echocardiography showed severe aortic stenosis, a heavily calcified bicuspid aortic valve and good left ventricular ejection fraction of approximately 60%.    An incision was made from the sternal notch to the xiphoid.  The sternum was opened longitudinally with a sternal saw.  Hemostasis was obtained with electrocautery at the sternal edges.  The patient was systemically heparinized.  The pericardium was opened longitudinally and tented anteriorly with Ethibond stay stitches.  A 22 [place] femoral arterial cannula was placed in the proximal arch of the aorta in the usual fashion followed by a dual stage venous cannula.  An antegrade cardioplegia cannula was placed in the distal ascending aorta.  Cardiopulmonary bypass was instituted.  The aorta was crossclamped and the patient was given 1 L of cold [place] cardioplegia in an antegrade fashion.  There was prompt cardiac arrest.  Ice slush was placed on the heart for further myocardial protection.  A phrenic nerve protector pad was used.  An additional 500 mL dose of [place] cardioplegia was given after 1 hour and 15 minutes directly through the coronary ostia.    The ascending aortic aneurysm was excised from the sinotubular junction to just below the aortic cross-clamp.  The coronary ostia were excised with an island of tissue around the orifice.  The aortic valve was bicuspid and heavily calcified.  The leaflets were excised and the annulus was sharply debrided with a rongeur.  Pledgeted #2-0 Ethibond stitches were then placed around the aortic valve annulus in a horizontal mattress fashion with the pledgets in the subannular position.  A 27 mm Konect Rogel valved conduit (27 mm Inspiris bovine pericardial valve and 30 mm tube graft) was then placed in the aortic valve annulus and secured in place with the Ethibond stitches utilizing the CorKnot device.  The coronary ostia were reanastomosed onto the tube graft using #6-0 Prolene sutures.  AC  locators were placed at the proximal anastomosis.  Rewarming of the patient was initiated.  A distal anastomosis was performed between the distal ascending aorta and the Hemashield tube graft using a #5-0 Prolene suture in a running fashion.    Approximately 300 mL of warm blood was given in an antegrade fashion and the aortic cross-clamp was removed.  Aortic cross-clamp time was 110 minutes and total cardiopulmonary bypass time was 147 minutes.  The left ventricle was de-aired in the usual fashion.  The carbon dioxide which had been released over the operative field during the operation was discontinued.  A straight and an angled 32 Malay chest tubes were placed the mediastinum.  Temporary epicardial ventricular pacemaker wires were inserted.  The patient was electrically cardioverted into a junctional rhythm.  The distal ascending aortic remnant was wrapped with the same tube graft.  When he was adequately warmed, he was slowly taken off cardiopulmonary bypass which he tolerated well.  The dual stage venous cannula was removed.  Protamine was given to reverse the effects of heparin.  The aortic cannula was removed.  When adequate hemostasis had been obtained, the sternum was reapproximated using size 5 sternal wires and the remainder of the incision was closed in several layers using Vicryl sutures.    Intraoperative transesophageal echocardiography showed a properly positioned and functioning aortic valve bioprosthesis without any paravalvular or central leaks.  His left ventricular ejection fraction remained normal at approximately 60%.  There were no apparent complications.  The patient tolerated the procedure well and left the operating room in guarded condition.      12/20/2024 12:21 PM Harris Mina MD, FACS

## 2024-12-20 NOTE — ASSESSMENT & PLAN NOTE
Holding BP medications today  Resume ACE inhibitor as clinically appropriate  Junctional rhythm by EKG a.m. 12/23-hold beta-blockers/AV karen agents for now

## 2024-12-20 NOTE — ANESTHESIA PREPROCEDURE EVALUATION
Case: [id number] Anesthesia Start Date/Time: 12/20/24 0727    Procedures:       AORTIC VALVE REPLACEMENT, ASCENDING AORTIC ANEURYSM REPAIR, AORTIC ROOT REPLACEMENT (Chest)      REPAIR, ANEURYSM, AORTA, ASCENDING (Chest)      ECHOCARDIOGRAM, TRANSESOPHAGEAL, INTRAOPERATIVE (Esophagus)    Anesthesia type: General    Pre-op diagnosis: AORTIC STENOSIS    Location: [facility] OR 02 / SURGERY [facility]    Surgeons: Harris Mina M.D.            Relevant Problems   CARDIAC   (positive) Aneurysm of ascending aorta without rupture (HCC)   (positive) Congenital insufficiency of aortic valve   (positive) Essential hypertension   (positive) Severe aortic stenosis       Physical Exam    Airway   Mallampati: II  TM distance: >3 FB  Neck ROM: full       Cardiovascular - normal exam  Rhythm: regular  Rate: normal  (-) murmur     Dental - normal exam           Pulmonary - normal exam  Breath sounds clear to auscultation     Abdominal    Neurological - normal exam                   Anesthesia Plan    ASA 4   ASA physical status 4 criteria: other (comment)    Plan - general       Airway plan will be ETT  PEDRO Planned                  Informed Consent:             

## 2024-12-20 NOTE — ANESTHESIA PROCEDURE NOTES
Called to inform pt of below message from provider; no answer; LM to call office        Trial course of Norvasc sent to be added to pt's current regimen. RTC in 2-4 weeks for repeat nurse visit BP check.    Central Venous Line    Performed by: Jeffry Pandey M.D.  Authorized by: Jeffry Pandey M.D.    Start Time:  12/20/2024 7:44 AM      provider hand hygiene performed prior to central venous catheter insertion, all 5 sterile barriers used (gloves, gown, cap, mask, large sterile drape) during central venous catheter insertion and skin prep agent completely dried prior to procedure    Patient Position:  Trendelenburg  Site:  Internal jugular  Prep:  Chlorhexidine  Catheter Size:  7 Fr  Number of Lumens:  Triple lumen  target vein identified, needle advanced into vein and blood aspirated and guidewire advanced into vein    Seldinger Technique?: Yes    Ultrasound-Guided: ultrasound-guided  Image captured, interpreted and electronically stored.  Sterile Gel and Probe Cover Used for Ultrasound?: Yes    Intravenous Verification: verified by ultrasound, venous blood return and chest x-ray pending    all ports aspirated, all ports flushed easily, guidewire was removed intact, biopatch was applied, line was sutured in place and dressing was applied    Events: patient tolerated procedure well with no complications

## 2024-12-20 NOTE — PROGRESS NOTES
Dr. Mina at bedside. Bleeding slowed to 30-40ml [id number]. Proceed with calcium and factor 7.    Plan to proceed with extubation over night.

## 2024-12-20 NOTE — ASSESSMENT & PLAN NOTE
Due to OR and AVR -no bleeding clinically now including from chest tubes  No history of bleeding issues or anemia of any kind  S/p 4 units FFP, 2 platelets, 2 cryo, CaCl 1g, and factor VII on 12/20  S/p 2 units pRBCs on 12/20,   S/p 2 units of pRBCs on 12/22  S/p 1 unit of  pRBCs on 12/23  Serial Hb/Hct  Transfuse for Hb< 8  Continue to monitor chest tube output   Output improved

## 2024-12-20 NOTE — ANESTHESIA POSTPROCEDURE EVALUATION
Patient: Lionel Epperson    Procedure Summary       Date: 12/20/24 Room / Location: [facility] 02 / SURGERY [facility]    Anesthesia Start: 0727 Anesthesia Stop: 1255    Procedures:       AORTIC VALVE REPLACEMENT, ASCENDING AORTIC ANEURYSM REPAIR, AORTIC ROOT REPLACEMENT (Chest)      REPAIR, ANEURYSM, AORTA, ASCENDING (Chest)      ECHOCARDIOGRAM, TRANSESOPHAGEAL, INTRAOPERATIVE (Esophagus) Diagnosis: (AORTIC STENOSIS)    Surgeons: Harris Mina M.D. Responsible Provider: Jeffry Panedy M.D.    Anesthesia Type: general ASA Status: 4            Final Anesthesia Type: general  Last vitals  BP   Blood Pressure : (!) 80/43    Temp   36.7 °C (98.1 °F)    Pulse   75   Resp   19    SpO2   99 %      Anesthesia Post Evaluation    Patient location during evaluation: ICU  Patient participation: waiting for patient participation  Level of consciousness: awake and alert and obtunded/minimal responses    Airway patency: patent  Anesthetic complications: no  Cardiovascular status: hemodynamically stable  Respiratory status: intubated, ETT and acceptable  Hydration status: euvolemic    PONV: none          No notable events documented.     Nurse Pain Score: 0 (NPRS)

## 2024-12-20 NOTE — ANESTHESIA PROCEDURE NOTES
Central Venous Line    Performed by: Jeffry Pandey M.D.  Authorized by: Jeffry Pandey M.D.    Start Time:  12/20/2024 7:44 AM  Patient Location:  OR      provider hand hygiene performed prior to central venous catheter insertion, all 5 sterile barriers used (gloves, gown, cap, mask, large sterile drape) during central venous catheter insertion and skin prep agent completely dried prior to procedure    Patient Position:  Trendelenburg  Site:  Internal jugular  Prep:  Chlorhexidine  Catheter Size:  7 Fr  Number of Lumens:  Triple lumen  target vein identified, needle advanced into vein and blood aspirated and guidewire advanced into vein    Seldinger Technique?: Yes    Ultrasound-Guided: ultrasound-guided  Image captured, interpreted and electronically stored.  Sterile Gel and Probe Cover Used for Ultrasound?: Yes    Intravenous Verification: verified by ultrasound, venous blood return and chest x-ray pending    all ports aspirated, all ports flushed easily, guidewire was removed intact, biopatch was applied, line was sutured in place and dressing was applied    Events: patient tolerated procedure well with no complications

## 2024-12-21 ENCOUNTER — HOSPITAL ENCOUNTER (OUTPATIENT)
Dept: RADIOLOGY | Facility: MEDICAL CENTER | Age: 69
End: 2024-12-21
Payer: MEDICARE

## 2024-12-21 PROBLEM — Z95.2 STATUS POST AORTIC VALVE REPLACEMENT: Chronic | Status: ACTIVE | Noted: 2024-12-20

## 2024-12-21 LAB
ALBUMIN SERPL BCP-MCNC: 3.2 G/DL (ref 3.2–4.9)
ANION GAP SERPL CALC-SCNC: 11 MMOL/L (ref 7–16)
BASE EXCESS BLDA CALC-SCNC: -6 MMOL/L (ref -4–3)
BODY TEMPERATURE: ABNORMAL DEGREES
BUN SERPL-MCNC: 29 MG/DL (ref 8–22)
BUN SERPL-MCNC: 36 MG/DL (ref 8–22)
CA-I SERPL-SCNC: 1.1 MMOL/L (ref 1.1–1.3)
CALCIUM ALBUM COR SERPL-MCNC: 9 MG/DL (ref 8.5–10.5)
CALCIUM SERPL-MCNC: 8.2 MG/DL (ref 8.5–10.5)
CALCIUM SERPL-MCNC: 8.4 MG/DL (ref 8.5–10.5)
CHLORIDE SERPL-SCNC: 104 MMOL/L (ref 96–112)
CHLORIDE SERPL-SCNC: 109 MMOL/L (ref 96–112)
CO2 BLDA-SCNC: 19 MMOL/L (ref 32–48)
CO2 SERPL-SCNC: 17 MMOL/L (ref 20–33)
CO2 SERPL-SCNC: 18 MMOL/L (ref 20–33)
CREAT SERPL-MCNC: 1.57 MG/DL (ref 0.5–1.4)
CREAT SERPL-MCNC: 2.25 MG/DL (ref 0.5–1.4)
DELSYS IDSYS: ABNORMAL
EKG IMPRESSION: NORMAL
ERYTHROCYTE [DISTWIDTH] IN BLOOD BY AUTOMATED COUNT: 45.1 FL (ref 35.9–50)
GFR SERPLBLD CREATININE-BSD FMLA CKD-EPI: 31 ML/MIN/1.73 M 2
GFR SERPLBLD CREATININE-BSD FMLA CKD-EPI: 48 ML/MIN/1.73 M 2
GLUCOSE BLD STRIP.AUTO-MCNC: 110 MG/DL (ref 65–99)
GLUCOSE BLD STRIP.AUTO-MCNC: 116 MG/DL (ref 65–99)
GLUCOSE BLD STRIP.AUTO-MCNC: 124 MG/DL (ref 65–99)
GLUCOSE BLD STRIP.AUTO-MCNC: 125 MG/DL (ref 65–99)
GLUCOSE BLD STRIP.AUTO-MCNC: 125 MG/DL (ref 65–99)
GLUCOSE BLD STRIP.AUTO-MCNC: 131 MG/DL (ref 65–99)
GLUCOSE BLD STRIP.AUTO-MCNC: 131 MG/DL (ref 65–99)
GLUCOSE BLD STRIP.AUTO-MCNC: 134 MG/DL (ref 65–99)
GLUCOSE BLD STRIP.AUTO-MCNC: 140 MG/DL (ref 65–99)
GLUCOSE BLD STRIP.AUTO-MCNC: 143 MG/DL (ref 65–99)
GLUCOSE BLD STRIP.AUTO-MCNC: 148 MG/DL (ref 65–99)
GLUCOSE BLD STRIP.AUTO-MCNC: 152 MG/DL (ref 65–99)
GLUCOSE BLD STRIP.AUTO-MCNC: 154 MG/DL (ref 65–99)
GLUCOSE BLD STRIP.AUTO-MCNC: 155 MG/DL (ref 65–99)
GLUCOSE BLD STRIP.AUTO-MCNC: 159 MG/DL (ref 65–99)
GLUCOSE BLD STRIP.AUTO-MCNC: 165 MG/DL (ref 65–99)
GLUCOSE BLD STRIP.AUTO-MCNC: 165 MG/DL (ref 65–99)
GLUCOSE BLD STRIP.AUTO-MCNC: 168 MG/DL (ref 65–99)
GLUCOSE BLD STRIP.AUTO-MCNC: 174 MG/DL (ref 65–99)
GLUCOSE SERPL-MCNC: 157 MG/DL (ref 65–99)
GLUCOSE SERPL-MCNC: 171 MG/DL (ref 65–99)
HCO3 BLDA-SCNC: 18.3 MMOL/L (ref 21–28)
HCT VFR BLD AUTO: 23.3 % (ref 42–52)
HCT VFR BLD AUTO: 24.4 % (ref 42–52)
HGB BLD-MCNC: 8 G/DL (ref 14–18)
HGB BLD-MCNC: 8.4 G/DL (ref 14–18)
LACTATE BLD-SCNC: 3.6 MMOL/L (ref 0.5–2)
LPM ILPM: 0 LPM
MCH RBC QN AUTO: 29.7 PG (ref 27–33)
MCHC RBC AUTO-ENTMCNC: 34.4 G/DL (ref 32.3–36.5)
MCV RBC AUTO: 86.2 FL (ref 81.4–97.8)
PCO2 BLDA: 30.3 MMHG (ref 32–48)
PCO2 TEMP ADJ BLDA: 29.7 MMHG (ref 32–48)
PH BLDA: 7.39 [PH] (ref 7.35–7.45)
PH TEMP ADJ BLDA: 7.39 [PH] (ref 7.35–7.45)
PHOSPHATE SERPL-MCNC: 6.7 MG/DL (ref 2.5–4.5)
PLATELET # BLD AUTO: 162 K/UL (ref 164–446)
PMV BLD AUTO: 11.3 FL (ref 9–12.9)
PO2 BLDA: 53 MMHG (ref 83–108)
PO2 TEMP ADJ BLDA: 51 MMHG (ref 64–87)
POTASSIUM SERPL-SCNC: 5.2 MMOL/L (ref 3.6–5.5)
POTASSIUM SERPL-SCNC: 5.3 MMOL/L (ref 3.6–5.5)
POTASSIUM SERPL-SCNC: 5.6 MMOL/L (ref 3.6–5.5)
RBC # BLD AUTO: 2.83 M/UL (ref 4.7–6.1)
SAO2 % BLDA: 87 % (ref 93–99)
SODIUM SERPL-SCNC: 135 MMOL/L (ref 135–145)
SODIUM SERPL-SCNC: 138 MMOL/L (ref 135–145)
SPECIMEN DRAWN FROM PATIENT: ABNORMAL
WBC # BLD AUTO: 17.9 K/UL (ref 4.8–10.8)

## 2024-12-21 PROCEDURE — 700111 HCHG RX REV CODE 636 W/ 250 OVERRIDE (IP): Performed by: NURSE PRACTITIONER

## 2024-12-21 PROCEDURE — 85014 HEMATOCRIT: CPT

## 2024-12-21 PROCEDURE — 82962 GLUCOSE BLOOD TEST: CPT | Mod: 91

## 2024-12-21 PROCEDURE — 770022 HCHG ROOM/CARE - ICU (200)

## 2024-12-21 PROCEDURE — 85027 COMPLETE CBC AUTOMATED: CPT

## 2024-12-21 PROCEDURE — 80069 RENAL FUNCTION PANEL: CPT

## 2024-12-21 PROCEDURE — 94669 MECHANICAL CHEST WALL OSCILL: CPT

## 2024-12-21 PROCEDURE — 700111 HCHG RX REV CODE 636 W/ 250 OVERRIDE (IP)

## 2024-12-21 PROCEDURE — 700105 HCHG RX REV CODE 258: Performed by: NURSE PRACTITIONER

## 2024-12-21 PROCEDURE — A9270 NON-COVERED ITEM OR SERVICE: HCPCS

## 2024-12-21 PROCEDURE — 93010 ELECTROCARDIOGRAM REPORT: CPT | Performed by: INTERNAL MEDICINE

## 2024-12-21 PROCEDURE — 700102 HCHG RX REV CODE 250 W/ 637 OVERRIDE(OP)

## 2024-12-21 PROCEDURE — 99024 POSTOP FOLLOW-UP VISIT: CPT | Performed by: THORACIC SURGERY (CARDIOTHORACIC VASCULAR SURGERY)

## 2024-12-21 PROCEDURE — 82803 BLOOD GASES ANY COMBINATION: CPT

## 2024-12-21 PROCEDURE — 71045 X-RAY EXAM CHEST 1 VIEW: CPT

## 2024-12-21 PROCEDURE — 83605 ASSAY OF LACTIC ACID: CPT

## 2024-12-21 PROCEDURE — 36600 WITHDRAWAL OF ARTERIAL BLOOD: CPT

## 2024-12-21 PROCEDURE — 700101 HCHG RX REV CODE 250: Performed by: NURSE PRACTITIONER

## 2024-12-21 PROCEDURE — 93005 ELECTROCARDIOGRAM TRACING: CPT | Mod: TC

## 2024-12-21 PROCEDURE — 85018 HEMOGLOBIN: CPT

## 2024-12-21 PROCEDURE — 99233 SBSQ HOSP IP/OBS HIGH 50: CPT | Performed by: INTERNAL MEDICINE

## 2024-12-21 PROCEDURE — 84132 ASSAY OF SERUM POTASSIUM: CPT

## 2024-12-21 PROCEDURE — 82330 ASSAY OF CALCIUM: CPT

## 2024-12-21 PROCEDURE — 80048 BASIC METABOLIC PNL TOTAL CA: CPT

## 2024-12-21 RX ORDER — DEXMEDETOMIDINE HYDROCHLORIDE 4 UG/ML
.1-1.5 INJECTION, SOLUTION INTRAVENOUS CONTINUOUS
Status: DISCONTINUED | OUTPATIENT
Start: 2024-12-21 | End: 2024-12-22

## 2024-12-21 RX ORDER — FUROSEMIDE 10 MG/ML
80 INJECTION INTRAMUSCULAR; INTRAVENOUS
Status: DISCONTINUED | OUTPATIENT
Start: 2024-12-21 | End: 2024-12-23

## 2024-12-21 RX ORDER — FUROSEMIDE 10 MG/ML
40 INJECTION INTRAMUSCULAR; INTRAVENOUS DAILY
Status: DISCONTINUED | OUTPATIENT
Start: 2024-12-21 | End: 2024-12-21

## 2024-12-21 RX ORDER — INSULIN LISPRO 100 [IU]/ML
2-9 INJECTION, SOLUTION INTRAVENOUS; SUBCUTANEOUS
Status: DISCONTINUED | OUTPATIENT
Start: 2024-12-21 | End: 2024-12-22

## 2024-12-21 RX ORDER — DEXTROSE MONOHYDRATE 25 G/50ML
25 INJECTION, SOLUTION INTRAVENOUS
Status: DISCONTINUED | OUTPATIENT
Start: 2024-12-21 | End: 2024-12-22

## 2024-12-21 RX ADMIN — ACETAMINOPHEN 1000 MG: 500 TABLET ORAL at 06:07

## 2024-12-21 RX ADMIN — OXYCODONE 5 MG: 5 TABLET ORAL at 18:18

## 2024-12-21 RX ADMIN — MAGNESIUM SULFATE IN DEXTROSE 1 G: 10 INJECTION, SOLUTION INTRAVENOUS at 06:15

## 2024-12-21 RX ADMIN — ONDANSETRON 8 MG: 2 INJECTION INTRAMUSCULAR; INTRAVENOUS at 06:32

## 2024-12-21 RX ADMIN — OXYCODONE 5 MG: 5 TABLET ORAL at 09:24

## 2024-12-21 RX ADMIN — OXYCODONE HYDROCHLORIDE 10 MG: 10 TABLET ORAL at 03:50

## 2024-12-21 RX ADMIN — ENOXAPARIN SODIUM 40 MG: 100 INJECTION SUBCUTANEOUS at 17:51

## 2024-12-21 RX ADMIN — POLYETHYLENE GLYCOL 3350 1 PACKET: 17 POWDER, FOR SOLUTION ORAL at 06:08

## 2024-12-21 RX ADMIN — ACETAMINOPHEN 1000 MG: 500 TABLET ORAL at 11:35

## 2024-12-21 RX ADMIN — Medication 1 APPLICATOR: at 08:16

## 2024-12-21 RX ADMIN — ASPIRIN 81 MG: 81 TABLET, COATED ORAL at 06:07

## 2024-12-21 RX ADMIN — FUROSEMIDE 80 MG: 10 INJECTION INTRAMUSCULAR; INTRAVENOUS at 13:53

## 2024-12-21 RX ADMIN — ROSUVASTATIN CALCIUM 10 MG: 10 TABLET, FILM COATED ORAL at 17:26

## 2024-12-21 RX ADMIN — FUROSEMIDE 80 MG: 10 INJECTION INTRAMUSCULAR; INTRAVENOUS at 17:51

## 2024-12-21 RX ADMIN — OMEPRAZOLE 20 MG: 20 CAPSULE, DELAYED RELEASE ORAL at 06:07

## 2024-12-21 RX ADMIN — DEXMEDETOMIDINE HYDROCHLORIDE 0.2 MCG/KG/HR: 100 INJECTION, SOLUTION INTRAVENOUS at 22:35

## 2024-12-21 RX ADMIN — ACETAMINOPHEN 1000 MG: 500 TABLET ORAL at 17:27

## 2024-12-21 RX ADMIN — FUROSEMIDE 40 MG: 10 INJECTION INTRAMUSCULAR; INTRAVENOUS at 08:16

## 2024-12-21 RX ADMIN — Medication 1 APPLICATOR: at 20:35

## 2024-12-21 ASSESSMENT — COGNITIVE AND FUNCTIONAL STATUS - GENERAL
MOVING TO AND FROM BED TO CHAIR: A LITTLE
MOVING FROM LYING ON BACK TO SITTING ON SIDE OF FLAT BED: A LITTLE
MOBILITY SCORE: 19
TOILETING: A LITTLE
CLIMB 3 TO 5 STEPS WITH RAILING: A LITTLE
WALKING IN HOSPITAL ROOM: A LITTLE
SUGGESTED CMS G CODE MODIFIER DAILY ACTIVITY: CJ
SUGGESTED CMS G CODE MODIFIER MOBILITY: CK
TURNING FROM BACK TO SIDE WHILE IN FLAT BAD: A LITTLE
DRESSING REGULAR LOWER BODY CLOTHING: A LITTLE
DRESSING REGULAR UPPER BODY CLOTHING: A LITTLE
DAILY ACTIVITIY SCORE: 20
HELP NEEDED FOR BATHING: A LITTLE

## 2024-12-21 ASSESSMENT — ENCOUNTER SYMPTOMS
CHILLS: 0
SORE THROAT: 0
COUGH: 0
NERVOUS/ANXIOUS: 0
EYE DISCHARGE: 0
DIZZINESS: 1
SHORTNESS OF BREATH: 0
NAUSEA: 0
FEVER: 0
FOCAL WEAKNESS: 0
VOMITING: 0
NECK PAIN: 0
FLANK PAIN: 0
BACK PAIN: 0
SPEECH CHANGE: 0
BRUISES/BLEEDS EASILY: 0
EYE REDNESS: 0
BLOOD IN STOOL: 0
ABDOMINAL PAIN: 0
SENSORY CHANGE: 0
DEPRESSION: 0

## 2024-12-21 ASSESSMENT — PATIENT HEALTH QUESTIONNAIRE - PHQ9
1. LITTLE INTEREST OR PLEASURE IN DOING THINGS: NOT AT ALL
2. FEELING DOWN, DEPRESSED, IRRITABLE, OR HOPELESS: NOT AT ALL
SUM OF ALL RESPONSES TO PHQ9 QUESTIONS 1 AND 2: 0

## 2024-12-21 ASSESSMENT — PAIN DESCRIPTION - PAIN TYPE
TYPE: SURGICAL PAIN
TYPE: ACUTE PAIN
TYPE: SURGICAL PAIN
TYPE: ACUTE PAIN;SURGICAL PAIN
TYPE: ACUTE PAIN
TYPE: SURGICAL PAIN
TYPE: ACUTE PAIN;SURGICAL PAIN
TYPE: SURGICAL PAIN
TYPE: SURGICAL PAIN
TYPE: ACUTE PAIN

## 2024-12-21 ASSESSMENT — FIBROSIS 4 INDEX: FIB4 SCORE: 2.63

## 2024-12-21 ASSESSMENT — SOCIAL DETERMINANTS OF HEALTH (SDOH)
WITHIN THE LAST YEAR, HAVE YOU BEEN HUMILIATED OR EMOTIONALLY ABUSED IN OTHER WAYS BY YOUR PARTNER OR EX-PARTNER?: NO
WITHIN THE LAST YEAR, HAVE YOU BEEN AFRAID OF YOUR PARTNER OR EX-PARTNER?: NO
WITHIN THE LAST YEAR, HAVE TO BEEN RAPED OR FORCED TO HAVE ANY KIND OF SEXUAL ACTIVITY BY YOUR PARTNER OR EX-PARTNER?: NO
WITHIN THE LAST YEAR, HAVE YOU BEEN KICKED, HIT, SLAPPED, OR OTHERWISE PHYSICALLY HURT BY YOUR PARTNER OR EX-PARTNER?: NO

## 2024-12-21 NOTE — CARE PLAN
The patient is Watcher - Medium risk of patient condition declining or worsening    Shift Goals  Clinical Goals: Wean oxygen, monitor urine output  Patient Goals: Pain control, eat  Family Goals: Keep updated.    Progress made toward(s) clinical / shift goals:  U/O low throughout shift.  MD's aware.  Will notify if U/O stops    Patient is not progressing towards the following goals:      Problem: Post Op Day 1 CABG/Heart Valve Replacement  Goal: Optimal care of the post op CABG/heart valve replacement Post Op Day 1  Intervention: EKG and CXR completed  Note: Completed  Intervention: Antibiotics are discontinued within 24 hours of anesthesia end time unless indication documented for continuation beyond 24 hours  Note: Completed  Intervention: Daily weights in the morning  Note: Weight completed by Night shift  Intervention: Up in chair for all meals  Note: Pt ambulated and up in chair for all meals  Intervention: Ambulate in am if stable. First ambulation 25 feet. Repeat x 3 as tolerated  Note: Ambulation x2 on day shift as of 1400  Intervention: Discontinue kidd catheter unless documented reason for continuation  Note: Continue kidd per CTS  Intervention: IS q 1 hour while awake and record best IS volume  Note: IS 1500, using q1h  Intervention: Knee high LEXA hose, on during the day, off at night  Note: LEXA hose on during day  Intervention: Saline lock IV  Note: IV lock, all infusions completed  Intervention: After 24th hour post-anesthesia end time, transition patient to Cardiac Surgery SQ Insulin Protocol  Note: Transitioned at 1259     Problem: Pain - Standard  Goal: Alleviation of pain or a reduction in pain to the patient’s comfort goal  Note: Pt educated on 0/10 pain scale and to notify RN for pain

## 2024-12-21 NOTE — CARE PLAN
Problem: Ventilation  Goal: Ability to achieve and maintain unassisted ventilation or tolerate decreased levels of ventilator support  Description: Target End Date:  4 days     Document on Vent flowsheet    1.  Support and monitor invasive and noninvasive mechanical ventilation  2.  Monitor ventilator weaning response  3.  Perform ventilator associated pneumonia prevention interventions  4.  Manage ventilation therapy by monitoring diagnostic test results  Outcome: Not Met  VD #1 8 @23  %, +8, 30%

## 2024-12-21 NOTE — CARE PLAN
The patient is Watcher - Medium risk of patient condition declining or worsening    Shift Goals  Clinical Goals: Wean pressors, mobilize, extuabate  Patient Goals: Pain control, comfort  Family Goals: Keep updated.    Progress made toward(s) clinical / shift goals:      Problem: Day of surgery post CABG/Heart valve replacement  Goal: Stabilization in immediate post op period  Outcome: Progressing  Intervention: VS q 15 min x 4 hours, then q 1 hour. Include temperature immediately upon arrival. Check CO/CI q 2-4 hours and PRN  Note: Done  Intervention: First post op hour labs and EKG per order  Note: Done on day shift  Intervention: Serum K q 6 hours x 24 hours.  ABG and CBC prn.  Note: Done  Intervention: Initiate post cardiac insulin infusion protocol orders for FSBS greater than 140 and check frequency per protocol  Note: Initiated on day shift,  Intervention: FSBS frequency as per Cardiac Surgery Insulin Drip Protocol  Note: Done  Intervention: Chest tube to 20 cm suction, record CT drainage with VS, and check for air leak  Note: No airlea. CT to 20 cm suction. Total output: 590  Intervention: For CT drainage >300 mL in first hour post op and/or 150 mL in subsequent hours: Stat platelets, PT, INR, TEG, iSTAT, and H&H per order  Note: N/A  Intervention: Titrate and wean off vasoactive drips per patient's condition and per MD order while maintaining SBP  mmHg per MD order  Note: Epi and vaso weaned off. Levo turned off at 0703  Intervention: VAP protocol in place  Note: Done while intubated  Intervention: Wean from Vent per protocol (see protocol), extubation goal within 6 hours post op  Note: Done  Intervention: IS q 1 hour while awake post extubation  Note: Best IS 1000  Intervention: Bedrest until extubated and groin lines out  Note: Done   Intervention: Dangle within 4 hours post extubation  Note: Done  Intervention: Up in chair 4 hours, day of extubation  Note: Pt sitting up in chair for  meals  Intervention: Maintain all original surgical dressings per provider orders and specifications  Note: Done  Intervention: Clear liquids post extubation, order carbohydrate free (post cardiac surgery) diet, advance as tolerated  Note: Done  Intervention: Discontinue Harrison krystle and arterial line 12-18 hours post op if hemodynamically stable and off vasoactive drips  Note: Artline still in place  Intervention: A-Fib and DVT prophylaxis per MD order or contraindications documented (refer to DVT/VTE problem on Care Plan)  Note: No swan krystle, artline still in place, Levo off at 0703  Intervention: Amiodarone protocol per MD order  Note: N/A           Problem: Hemodynamics  Goal: Patient's hemodynamics, fluid balance and neurologic status will be stable or improve  Outcome: Progressing     Problem: Respiratory  Goal: Patient will achieve/maintain optimum respiratory ventilation and gas exchange  Outcome: Progressing     Problem: Pain - Standard  Goal: Alleviation of pain or a reduction in pain to the patient’s comfort goal  Outcome: Progressing     Problem: Knowledge Deficit - Standard  Goal: Patient and family/care givers will demonstrate understanding of plan of care, disease process/condition, diagnostic tests and medications  Outcome: Progressing       Patient is not progressing towards the following goals:

## 2024-12-21 NOTE — CARE PLAN
Problem: Ventilation  Goal: Ability to achieve and maintain unassisted ventilation or tolerate decreased levels of ventilator support  Description: Target End Date:  4 days     Document on Vent flowsheet    1.  Support and monitor invasive and noninvasive mechanical ventilation  2.  Monitor ventilator weaning response  3.  Perform ventilator associated pneumonia prevention interventions  4.  Manage ventilation therapy by monitoring diagnostic test results  Outcome: Progressing     Ventilator Daily Summary    Vent Day # 1  Airway: 8@23    Ventilator settings:  8 30%  Weaning trials: progressing to spont  Respiratory Procedures:     Plan: Continue current ventilator settings and wean mechanical ventilation as tolerated per physician orders.

## 2024-12-21 NOTE — CARE PLAN
Problem: Day of surgery post CABG/Heart valve replacement  Goal: Stabilization in immediate post op period  Outcome: Progressing  Intervention: VS q 15 min x 4 hours, then q 1 hour. Include temperature immediately upon arrival. Check CO/CI q 2-4 hours and PRN  Note: See VS flowsheet  Intervention: If radial artery used, elevate arm, no BP checks or needle sticks from affected arm, monitor ulnar pulse and capillary refill  Note: NA  Intervention: First post op hour labs and EKG per order  Note: Complete  Intervention: Serum K q 6 hours x 24 hours.  ABG and CBC prn.  Note: WDL  Intervention: Initiate post cardiac insulin infusion protocol orders for FSBS greater than 140 and check frequency per protocol  Note: Initiated per protocol  Intervention: FSBS frequency as per Cardiac Surgery Insulin Drip Protocol  Note: Q1hr  Intervention: For patients on Beta Blockers: verify dose given prior to surgery or within 6 hours after arrival to the unit  Note: NA  Intervention: Chest tube to 20 cm suction, record CT drainage with VS, and check for air leak  Note: WDL  Intervention: For CT drainage >300 mL in first hour post op and/or 150 mL in subsequent hours: Stat platelets, PT, INR, TEG, iSTAT, and H&H per order  Note: Bleeding upon arrival to [facility]. See interventions, I/Os  Intervention: Titrate and wean off vasoactive drips per patient's condition and per MD order while maintaining SBP  mmHg per MD order  Note: Maintaining on levo, epi, vaso  Intervention: Wean from Vent per protocol (see protocol), extubation goal within 6 hours post op  Note: Vent weaned  Intervention: Maintain all original surgical dressings per provider orders and specifications  Note: Island dressing CDI  Intervention: Amiodarone protocol per MD order  Note: NA

## 2024-12-21 NOTE — CARE PLAN
Problem: Hyperinflation  Goal: Prevent or improve atelectasis  Description: 1. Instruct incentive spirometry usage  2.  Perform hyperinflation therapy as indicated  Outcome: Not Met       Respiratory Update    Treatment modality: PEP  Frequency: QID    Pt tolerating current treatments well with no adverse reactions.

## 2024-12-21 NOTE — PROGRESS NOTES
Pt on EPI at 0.02, levo 0.14, Vaso 0.03. CVP 10. Urine output between  mL/hr, chest tube output  30-40mL/hr.     Yolande (APRN) updated on the above. Plan is to continue with extubation if pt remains on current doses of pressors and pt  meets respiratory parameter.

## 2024-12-21 NOTE — PROGRESS NOTES
Critical Care Progress Note    Date of admission  12/20/2024    Chief Complaint  69 y.o. male admitted 12/20/2024 with severe aortic stenosis s/p AVR    Hospital Course  Mr. Epperson is a 69 y.o. male with the past medical history significant for hypertension, hyperlipidemia, aortic stenosis due to bicuspid aortic valve, and ascending aortic enlargement who was sent to cardiothoracic, Dr. Mina, for consideration of valve replacement due to worsening dizziness with position changes.  The patient is active at baseline.  Cardiothoracic recommended aortic valve replacement with aortic aneurysm repair.  The patient was taken to the operating room on 12/20/2024 where he underwent elective aortic valve replacement with coronary artery reimplantation.  The surgery itself was uncomplicated; however, when the patient was coming off pump he was found to have a mild bleed that was repaired by Dr. Rizvi and received 1 unit of platelets due to oozing.  The patient also went into ventricular tachycardia coming off pump requiring 300mg of amiodarone and 100 mg of lidocaine.  He was then 100% paced when he was brought up to the ICU.  While getting settled in the ICU it was noted that his chest tubes had already put out close to 300 cc of bright red blood along with increasing Levophed requirements.  The patient was given boluses of IV fluids as well as 2 units of FFP and cryoprecipitate.  The critical care team was consulted to assist with postoperative management.     Interval Problem Update  Reviewed last 24 hour events:   - continued bleeding and received another round of 2 units FFP, 1 platelets, and 1 cryo   - Hb of 8.3-->transfused 1 unit pRBCs   - eventually received 1g CaCl and Factor VII   - repeat Hb of 8.3-->addition 1 unit of pRBCs   - vasopressors titrated down   - extubated at 11 pm   - POD#1   - a/ox4   - up to chair   -minor chest pain    - SR/SB 50-60s   - SBP 90-110s   - off eop and levo   - insulin at 1  unit/hr   - no pacing   - Tmax 37.3   - on room air   - ISS 1200cc   - UOP of 500cc overnight, Zamora   - BM pta   - right IJ DBL, right IJ TLC   -    - chest tubes 590cc ovenright   - PEP QID   - CXR(reviewed): cephalization noted   - lovenox   - PPI   - Hb 8.4   - platelets 162   - K 5.6   - creat 1.57      Review of Systems  Review of Systems   Constitutional:  Positive for malaise/fatigue. Negative for chills and fever.   HENT:  Negative for congestion and sore throat.    Eyes:  Negative for discharge and redness.   Respiratory:  Negative for cough and shortness of breath.    Cardiovascular:  Negative for chest pain and leg swelling.   Gastrointestinal:  Negative for abdominal pain, blood in stool, nausea and vomiting.   Genitourinary:  Negative for flank pain and hematuria.   Musculoskeletal:  Negative for back pain and neck pain.   Skin:  Negative for rash.   Neurological:  Positive for dizziness. Negative for sensory change, speech change and focal weakness.   Endo/Heme/Allergies:  Does not bruise/bleed easily.   Psychiatric/Behavioral:  Negative for depression. The patient is not nervous/anxious.         Vital Signs for last 24 hours   Temp:  [36 °C (96.8 °F)-37.8 °C (100 °F)] 37.6 °C (99.7 °F)  Pulse:  [48-88] 52  Resp:  [12-21] 19  BP: ()/(37-66) 102/54  SpO2:  [91 %-100 %] 94 %    Hemodynamic parameters for last 24 hours  CVP:  [6 MM HG-17 MM HG] 14 MM HG    Respiratory Information for the last 24 hours  Vent Mode: Spont  PEEP/CPAP: 8  P Support: 5  MAP: 10  Control VTE (exp VT): 808    Physical Exam   Physical Exam  Vitals and nursing note reviewed.   Constitutional:       General: He is not in acute distress.     Appearance: He is ill-appearing. He is not toxic-appearing.   HENT:      Head: Normocephalic and atraumatic.      Right Ear: External ear normal.      Left Ear: External ear normal.      Nose: Nose normal. No rhinorrhea.      Mouth/Throat:      Mouth: Mucous membranes are moist.       Pharynx: Oropharynx is clear. No oropharyngeal exudate.   Eyes:      General: No scleral icterus.     Conjunctiva/sclera: Conjunctivae normal.      Pupils: Pupils are equal, round, and reactive to light.   Neck:      Comments: Right IJ TLC  Cardiovascular:      Rate and Rhythm: Normal rate and regular rhythm.      Pulses:           Dorsalis pedis pulses are 1+ on the right side and 1+ on the left side.      Heart sounds: Heart sounds are distant. No murmur heard.  Pulmonary:      Breath sounds: No wheezing.      Comments: chest tubes in place  Chest:      Chest wall: No tenderness.   Abdominal:      Palpations: Abdomen is soft.      Tenderness: There is no abdominal tenderness. There is no guarding or rebound.   Genitourinary:     Comments: Zamora in place  Musculoskeletal:         General: Normal range of motion.      Cervical back: Normal range of motion and neck supple.      Right lower leg: No edema.      Left lower leg: No edema.   Lymphadenopathy:      Cervical: No cervical adenopathy.   Skin:     General: Skin is warm and dry.      Capillary Refill: Capillary refill takes less than 2 seconds.      Findings: No rash.   Neurological:      Mental Status: He is alert and oriented to person, place, and time.      Cranial Nerves: No cranial nerve deficit.      Sensory: No sensory deficit.      Motor: No weakness.   Psychiatric:         Mood and Affect: Mood normal.         Behavior: Behavior normal.         Thought Content: Thought content normal.         Medications  Current Facility-Administered Medications   Medication Dose Route Frequency Provider Last Rate Last Admin    norepinephrine (Levophed) 8 mg in 250 mL NS infusion (premix)  0-1 mcg/kg/min (Ideal) Intravenous Continuous Harris Mina M.D. 6 mL/hr at 12/21/24 0230 0.04 mcg/kg/min at 12/21/24 0230    EPINEPHrine (Adrenalin) infusion 4 mg/250 mL (premix)  0-0.5 mcg/kg/min (Ideal) Intravenous Continuous Harris Mina M.D.   Stopped at 12/21/24 0057     rosuvastatin (Crestor) tablet 10 mg  10 mg Oral Q EVENING Alejandro Nash P.A.-C.        Respiratory Therapy Consult   Nebulization Continuous RT Alejandro Nash P.A.-C.        NS infusion   Intravenous Continuous Alejandro Nash P.A.-C. 10 mL/hr at 12/20/24 2249 New Bag at 12/20/24 2249    NS infusion   Intravenous Continuous Alejandro Nash P.A.-C. 30 mL/hr at 12/20/24 1401 Associate Infusion Pump at 12/20/24 1401    enoxaparin (Lovenox) inj 40 mg  40 mg Subcutaneous DAILY AT 1800 Alejandro Nash P.A.-C.        Nozin nasal  swab  1 Applicator Each Nostril BID Alejandro Nash P.A.-C.   1 Applicator at 12/20/24 2007    calcium CHLORIDE 10 % 1,000 mg in  mL IVPB  1,000 mg Intravenous Once PRN Alejandro Nash P.A.-C.        calcium CHLORIDE 10 % 1,000 mg in  mL IVPB  1,000 mg Intravenous Once PRN DIONNA Smith-CThad        magnesium sulfate in D5W IVPB premix 1 g  1 g Intravenous DAILY DIONNA Smith-FLORENCIA 100 mL/hr at 12/21/24 0615 1 g at 12/21/24 0615    K+ Scale: Goal of 4.5  1 Each Intravenous Q6HRS DIONNA Smith-FLORENCIA        metoprolol tartrate (Lopressor) tablet 12.5 mg  12.5 mg Oral BID Alejandro Nash P.A.-C.        Followed by    [START ON 12/22/2024] metoprolol tartrate (Lopressor) tablet 25 mg  25 mg Oral BID DIONNA Smith-JEFF.        aspirin EC tablet 81 mg  81 mg Oral DAILY Alejandro Nash P.A.-C.   81 mg at 12/21/24 0607    clevidipine (Cleviprex) IV emulsion  0-21 mg/hr Intravenous Continuous KVNG SmithAThad-FLORENCIA        nitroglycerin 50 mg in D5W 250 ml infusion  0-100 mcg/min Intravenous Continuous Alejandro Nash P.A.-C.   Dose not Required at 12/20/24 1300    acetaminophen (Tylenol) tablet 1,000 mg  1,000 mg Oral Q6HRS KVNG SmithA.-C.   1,000 mg at 12/21/24 0607    Followed by    [START ON 12/30/2024] acetaminophen (Tylenol) tablet 1,000 mg  1,000 mg Oral Q6HRS PRN DIONNA Smith-C.        oxyCODONE immediate-release (Roxicodone) tablet 5 mg  5 mg Oral Q3HRS PRN Alejandro Nash,  P.A.-C.   5 mg at 12/20/24 2351    Or    oxyCODONE immediate release (Roxicodone) tablet 10 mg  10 mg Oral Q3HRS PRN DIONNA Smith-C.   10 mg at 12/21/24 0350    Or    fentaNYL (Sublimaze) injection 50 mcg  50 mcg Intravenous Q3HRS PRN VKNG SmithA.-C.        traMADol (Ultram) 50 MG tablet 50 mg  50 mg Oral Q4HRS PRN KVNG SmithAThad-JEFF.        dexmedetomidine (Precedex) 400 mcg/100mL infusion  0-1.5 mcg/kg/hr (Ideal) Intravenous Continuous CHRISSY SmithC. 6 mL/hr at 12/21/24 0218 0.3 mcg/kg/hr at 12/21/24 0218    sodium bicarbonate 8.4 % injection 50 mEq  50 mEq Intravenous Q HOUR PRN KVNG SmithA.-C.        ondansetron (Zofran) syringe/vial injection 8 mg  8 mg Intravenous Q6HRS PRN KVNG SmithAThad-C.   8 mg at 12/20/24 2349    Or    prochlorperazine (Compazine) injection 10 mg  10 mg Intravenous Q6HRS PRN KVNG SmithA.-C.        acetaminophen (Tylenol) tablet 650 mg  650 mg Oral Q4HRS PRN KVNG SmithA.-C.        Or    acetaminophen (Tylenol) suppository 650 mg  650 mg Rectal Q4HRS PRN DIONNA Smith-C.        senna-docusate (Pericolace Or Senokot S) 8.6-50 MG per tablet 2 Tablet  2 Tablet Oral BID FROILAN Smith.-CThad        And    polyethylene glycol/lytes (Miralax) Packet 1 Packet  1 Packet Oral DAILY Alejandro Nash P.A.-C.   1 Packet at 12/21/24 0608    And    [START ON 12/22/2024] magnesium hydroxide (Milk Of Magnesia) suspension 30 mL  30 mL Oral DAILY Alejandro Nash P.A.-C.        And    bisacodyl (Dulcolax) suppository 10 mg  10 mg Rectal QDAY PRN DIONNA Smith-C.        omeprazole (PriLOSEC) capsule 20 mg  20 mg Oral DAILY Alejandro Nash P.A.-C.   20 mg at 12/21/24 0607    mag hydrox-al hydrox-simeth (Maalox Plus Es Or Mylanta Ds) suspension 30 mL  30 mL Oral Q4HRS PRN Alejandro Nash P.A.-C.        LR (Bolus) infusion 2,000 mL  2,000 mL Intravenous PRN Alejandro Nash P.A.-C.        vasopressin (Vasostrict) 20 Units in  mL Infusion  0.03 Units/min Intravenous  Continuous Alejandro Nash P.A.-C.   Stopped at 12/21/24 0237    insulin regular (HumuLIN R/NovoLIN R) 1 unit/mL syringe (IV ONLY) 0-14 Units  0-14 Units Intravenous Once Harris Mina M.D.        insulin lispro (HumaLOG,AdmeLOG) subcutaneous injection  0-14 Units Subcutaneous TID AC Harris Mina M.D.        insulin regular (HumuLIN R/NovoLIN R) 100 Units in  mL infusion premix  0-29 Units/hr Intravenous Continuous Harris Mina M.D. 1 mL/hr at 12/21/24 0539 1 Units/hr at 12/21/24 0539    dextrose 50% (D50W) injection 12.5-25 g  12.5-25 g Intravenous PRN Harris Mina M.D.        MD Alert...Pharmacy to initiate transition from insulin infusion to subcutaneous insulin for cardiothoracic surgery 1 Each  1 Each Other Continuous Harris Mina M.D.   1 Each at 12/20/24 1500       Fluids    Intake/Output Summary (Last 24 hours) at 12/21/2024 0625  Last data filed at 12/21/2024 0236  Gross per 24 hour   Intake 7594.12 ml   Output 4391 ml   Net 3203.12 ml       Laboratory  Recent Labs     12/20/24  1700 12/20/24  1842 12/20/24  2301   ISTATAPH 7.335* 7.369 7.412   ISTATAPCO2 31.0* 28.9* 27.8*   ISTATAPO2 81* 90 86   ISTATATCO2 17* 18* 19*   NRVKLFJ0SLY 95 97 97   ISTATARTHCO3 16.5* 16.7* 17.7*   ISTATARTBE -9* -8* -6*   ISTATTEMP 36.3 C 36.9 C 37.8 C   ISTATFIO2 30 30 30   ISTATSPEC Arterial Arterial Arterial   ISTATAPHTC 7.345* 7.371 7.401   [id number] 77 89* 91*         Recent Labs     12/18/24  0813 12/20/24  1300 12/20/24  1735 12/21/24  0007 12/21/24  0415   SODIUM 139  --   --   --  138   POTASSIUM 4.3 4.5 4.4 5.2 5.6*   CHLORIDE 105  --   --   --  109   CO2 25  --   --   --  18*   BUN 17  --   --   --  29*   CREATININE 1.11  --   --   --  1.57*   MAGNESIUM  --  4.3*  --   --   --    CALCIUM 9.9  --   --   --  8.2*     Recent Labs     12/18/24  0813 12/21/24  0415   ALTSGPT 32  --    ASTSGOT 36  --    ALKPHOSPHAT 47  --    TBILIRUBIN 0.7  --    GLUCOSE 98 157*     Recent Labs     12/18/24 0813  12/21/24  0415   WBC 7.7 17.9*   NEUTSPOLYS 55.10  --    LYMPHOCYTES 32.80  --    MONOCYTES 9.60  --    EOSINOPHILS 1.40  --    BASOPHILS 0.80  --    ASTSGOT 36  --    ALTSGPT 32  --    ALKPHOSPHAT 47  --    TBILIRUBIN 0.7  --      Recent Labs     12/18/24  0813 12/20/24  1300 12/20/24  1340 12/20/24  1735 12/20/24  2129 12/21/24  0415   RBC 5.29  --   --   --   --  2.83*   HEMOGLOBIN 16.1 11.9* 8.3* 8.3* 9.5* 8.4*   HEMATOCRIT 46.7 35.0* 24.7* 24.7* 27.0* 24.4*   PLATELETCT 216 184  --   --   --  162*   PROTHROMBTM 13.9 20.2* 19.8*  --   --   --    APTT 25.5 32.1  --   --   --   --    INR 1.07 1.72* 1.67*  --   --   --        Imaging  Reviewed    Assessment/Plan  * Severe aortic stenosis- (present on admission)  Assessment & Plan  Due to bicuspid aortic valve  S/p AVR with Dr. Mina on 12/20  Hemodynamics per CVS  SBP goals > 90 and < 120  Vasopressors for low SBP, off vasoactives this morning   Monitor chest tube output for bleeding  Serial Hb and BMP    Encounter for weaning from ventilator (HCC)  Assessment & Plan  Intubated for AVR on 12/20  Extubated later in the evening  RT/O2 protocols  IS/early mobility       Acute blood loss anemia- (present on admission)  Assessment & Plan  Due to OR and AVR   S/p 4 units FFP, 2 platelets, 2 cryo, CaCl 1g, and factor VII  S/p 2 units pRBCs  Serial Hb/Hct  Transfuse for Hb< 8  Monitor chest tube output     Aneurysm of ascending aorta without rupture (HCC)- (present on admission)  Assessment & Plan  Only 4.4cm and not repaired      Pure hypercholesterolemia- (present on admission)  Assessment & Plan  Cont home crestor 10mg PO daily     Essential hypertension- (present on admission)  Assessment & Plan  Holding BP medications today    Bicuspid aortic valve- (present on admission)  Assessment & Plan  S/p AVR on 12/20         VTE:  Lovenox  Ulcer: PPI  Lines: Central Line  Ongoing indication addressed, Arterial Line  D/C'd today, and Zamora Catheter  Ongoing indication  addressed    I have performed a physical exam and reviewed and updated ROS and Plan today (12/21/2024). In review of yesterday's note (12/20/2024), there are no changes except as documented above.     Discussed patient condition and risk of morbidity and/or mortality with Family, RN, RT, Pharmacy, Charge nurse / hot rounds, Patient, and CVS    The patient weaned off of vasoactive's this morning and has been hemodynamically stable with far less chest tube output in the last 12 hours.  The critical care team will continue to follow for now.

## 2024-12-21 NOTE — HOSPITAL COURSE
Mr. Epperson is a 69 y.o. male with the past medical history significant for hypertension, hyperlipidemia, aortic stenosis due to bicuspid aortic valve, and ascending aortic enlargement who was sent to cardiothoracic, Dr. Mina, for consideration of valve replacement due to worsening dizziness with position changes.  The patient is active at baseline.  Cardiothoracic recommended aortic valve replacement with aortic aneurysm repair.  The patient was taken to the operating room on 12/20/2024 where he underwent elective aortic valve replacement with coronary artery reimplantation.  The surgery itself was uncomplicated; however, when the patient was coming off pump he was found to have a mild bleed that was repaired by Dr. Rizvi and received 1 unit of platelets due to oozing.  The patient also went into ventricular tachycardia coming off pump requiring 300mg of amiodarone and 100 mg of lidocaine.  He was then 100% paced when he was brought up to the ICU.  While getting settled in the ICU it was noted that his chest tubes had already put out close to 300 cc of bright red blood along with increasing Levophed requirements.  The patient was given boluses of IV fluids as well as 2 units of FFP and cryoprecipitate.  The critical care team was consulted to assist with postoperative management.    Pt started on Zoloft 50mg this past September by Dr. Beal  Pt had f/u appt to zoloft in November with Dr. Pederson  Pt feels depression and anxiety are under control with zoloft 50mg but now having all sorts of trouble sleeping and has developed daily headaches, some that move into a migraine category.   Pt tried melatonin in the past with out helping sleep issue.    Pt advised to return to clinic for consultation with provider.

## 2024-12-21 NOTE — PROGRESS NOTES
Total CT output no 580ml  Alejandro Nash notified. Finish blood products an reassess. Check coags and H/H    1349- Bleeding still uncontrolled. Order received for 2 FFP, 1 Plts, 1 Cryo

## 2024-12-21 NOTE — PROGRESS NOTES
4 Eyes Skin Assessment Completed by JEAN-CLAUDE Burt and JEAN-CLAUDE Mazariegos.    Head WDL  Ears WDL  Nose WDL  Mouth WDL  Neck WDL  Breast/Chest Midline incision  Shoulder Blades WDL  Spine WDL  (R) Arm/Elbow/Hand WDL  (L) Arm/Elbow/Hand WDL  Abdomen CT in place  Groin WDL  Scrotum/Coccyx/Buttocks WDL  (R) Leg WDL  (L) Leg WDL  (R) Heel/Foot/Toe WDL  (L) Heel/Foot/Toe  WDL          Devices In Places ECG, Blood Pressure Cuff, Pulse Ox, Zamora, Arterial Line, and Central Line, PIV x1, nasal canula       Interventions In Place Gray Ear Foams, Q2 Turns, and Low Air Loss Mattress    Possible Skin Injury No    Pictures Uploaded Into Epic N/A  Wound Consult Placed N/A  RN Wound Prevention Protocol Ordered No

## 2024-12-21 NOTE — PROGRESS NOTES
HGB 8.3 on recheck. APRN notified, Transfuse 1unit RBCs, 1g Ca++    Continues to bleed from central line insertion site. OK to redress with sergicel per APRN.

## 2024-12-21 NOTE — PROGRESS NOTES
Pt arrived to room at 1245.  Accompanied by OR team.  Bedside report received from Dr. Pandey.  Pt connected to monitor.  Labs sent. Chest tube connected to suction. Initial I&Os recorded.  Initial blood gas and blood sugar obtained.  EKG done. SCDs applied. Discussed pt condition and POC.      250ml in chestube on arrival, 2 FFP, 1 cryo now per Alejandro Nash  Patient becoming HOTN, upwards titration of levo and added vaso    Gtts on arrival:  Epi 0.04  Levo 0.12

## 2024-12-21 NOTE — PROGRESS NOTES
Extubation note:     RSBI: 20  Pinsp: 14  RR: 12  FIO2: 30  NIF: -25  VC: 1200  PaCO2: 28  pH: 7.4  SPO2: 95  Patient hemodynamically stable on vasoactive medications pressors: Vaso 0.03, Epi 0.02, Levo 0.06-per jhon Ricci to extubate. RT Gladys and this RN agree that patient is safe to extubate.     Patient extubated at 2308 and placed on 2L NC. Oxygen saturation is at 98% with no respiratory distress.    Pt educated to rest his voice for one hour to prevent airway swelling; Pt demonstrates understanding.     Total intubation time: 10hrs 23mins

## 2024-12-21 NOTE — PROGRESS NOTES
Cardiovascular Surgery Progress Note    Name: Lionel Epperson  MRN: [id number]  : 1955  Admit Date: 2024  5:09 AM  1 Day Post-Op     Procedure:  Procedure(s) and Anesthesia Type:     * AORTIC VALVE REPLACEMENT, ASCENDING AORTIC ANEURYSM REPAIR, AORTIC ROOT REPLACEMENT - General     * REPAIR, ANEURYSM, AORTA, ASCENDING - General     * ECHOCARDIOGRAM, TRANSESOPHAGEAL, INTRAOPERATIVE - General    Vitals:  Vitals:    24 0615 24 0630 24 0645 24 0700   BP: 104/53 95/52 111/50 111/57   Pulse: (!) 56 (!) 58 (!) 57 (!) 55   Resp: 19 20 (!) 23 18   Temp:       TempSrc:       SpO2:  95% 100% 99%   Weight:       Height:          Temp (24hrs), Av °C (98.6 °F), Min:36 °C (96.8 °F), Max:37.8 °C (100 °F)      Respiratory:  Vent Mode: Spont, PEEP/CPAP: 8, PIP: 14, MAP: 10 Respiration: 18, Pulse Oximetry: 99 %       Fluids:    Intake/Output Summary (Last 24 hours) at 2024 0742  Last data filed at 2024 0714  Gross per 24 hour   Intake 8130.94 ml   Output 4721 ml   Net 3409.94 ml     Admit weight: Weight: 89.2 kg (196 lb 10.4 oz)  Current weight: Weight: 94.2 kg (207 lb 10.8 oz) (24 0500)    Labs:  Recent Labs     24  0813 24  1300 24  1340 24  1735 24  2129 24  0415   WBC 7.7  --   --   --   --  17.9*   RBC 5.29  --   --   --   --  2.83*   HEMOGLOBIN 16.1 11.9*   < > 8.3* 9.5* 8.4*   HEMATOCRIT 46.7 35.0*   < > 24.7* 27.0* 24.4*   MCV 88.3  --   --   --   --  86.2   MCH 30.4  --   --   --   --  29.7   MCHC 34.5  --   --   --   --  34.4   RDW 40.6  --   --   --   --  45.1   PLATELETCT 216 184  --   --   --  162*   MPV 12.3  --   --   --   --  11.3    < > = values in this interval not displayed.     Recent Labs     24  0813 24  1300 24  1735 24  0007 24  0415   SODIUM 139  --   --   --  138   POTASSIUM 4.3   < > 4.4 5.2 5.6*   CHLORIDE 105  --   --   --  109   CO2 25  --   --   --  18*   GLUCOSE 98  --   --    --  157*   BUN 17  --   --   --  29*   CREATININE 1.11  --   --   --  1.57*   CALCIUM 9.9  --   --   --  8.2*    < > = values in this interval not displayed.     Recent Labs     12/18/24  0813 12/20/24  1300 12/20/24  1340   APTT 25.5 32.1  --    INR 1.07 1.72* 1.67*       HgbA1c:  5.4    Diabetic Educator Consult:  N/A    Medications:  Scheduled Medications   Medication Dose Frequency    rosuvastatin  10 mg Q EVENING    enoxaparin (LOVENOX) injection  40 mg DAILY AT 1800    Nozin nasal  swab  1 Applicator BID    magnesium sulfate  1 g DAILY    K+ Scale: Goal of 4.5  1 Each Q6HRS    metoprolol tartrate  12.5 mg BID    Followed by    [START ON 12/22/2024] metoprolol tartrate  25 mg BID    aspirin  81 mg DAILY    acetaminophen  1,000 mg Q6HRS    senna-docusate  2 Tablet BID    And    polyethylene glycol/lytes  1 Packet DAILY    And    [START ON 12/22/2024] magnesium hydroxide  30 mL DAILY    omeprazole  20 mg DAILY    insulin regular  0-14 Units Once    insulin lispro  0-14 Units TID AC        Exam:   Physical Exam  Vitals and nursing note reviewed.   Constitutional:       General: He is not in acute distress.     Appearance: Normal appearance.   HENT:      Head: Normocephalic.      Right Ear: External ear normal.      Left Ear: External ear normal.      Nose: Nose normal. No congestion.      Mouth/Throat:      Mouth: Mucous membranes are moist.      Pharynx: Oropharynx is clear.   Eyes:      Extraocular Movements: Extraocular movements intact.      Pupils: Pupils are equal, round, and reactive to light.   Cardiovascular:      Rate and Rhythm: Normal rate and regular rhythm.      Pulses: Normal pulses.      Heart sounds: Normal heart sounds.   Pulmonary:      Effort: Pulmonary effort is normal.      Breath sounds: Decreased breath sounds present.   Abdominal:      General: Bowel sounds are decreased. There is no distension.      Palpations: Abdomen is soft.   Musculoskeletal:      Cervical back: Normal range  of motion and neck supple. No tenderness.      Right lower leg: Edema present.      Left lower leg: Edema present.   Skin:     General: Skin is warm and dry.      Comments: Midline surgical incision   Neurological:      General: No focal deficit present.      Mental Status: He is alert and oriented to person, place, and time. Mental status is at baseline.   Psychiatric:         Mood and Affect: Mood normal.         Behavior: Behavior normal.         Thought Content: Thought content normal.         Cardiac Medications:    ASA - Yes    Plavix - No; contraindicated because of Other Aortic root    Post-operative Beta Blockers - Yes    Ace/ARB- No; contraindicated because of Normal EF    ARNI-  No; contraindicated because of Normal EF    Statin - No; contraindicated because of No CAD    Aldactone- No; contraindicated because of Normal EF    SGLT2i-  No; contraindicated because of Normal EF    Ejection Fraction:  60%    Telemetry:   12/21 SB    Assessment/Plan:  POD 1  HDS, SB, neuro intact, wounds intact, abdomen soft, fluid balance positive, wt up, 2 L NC. H/H 8.4/24.4, K 5.6, 1.57. Plan:  Diurese. Monitor renal function and h/h. Keep chest tubes, pacing wires, and kidd. IS/ambulate.     Disposition:  TBD

## 2024-12-22 PROBLEM — N17.9 AKI (ACUTE KIDNEY INJURY) (HCC): Status: ACTIVE | Noted: 2024-12-22

## 2024-12-22 PROBLEM — G93.40 ACUTE ENCEPHALOPATHY: Status: ACTIVE | Noted: 2024-12-22

## 2024-12-22 LAB
ANION GAP SERPL CALC-SCNC: 12 MMOL/L (ref 7–16)
BASE EXCESS BLDA CALC-SCNC: -1 MMOL/L (ref -4–3)
BODY TEMPERATURE: ABNORMAL DEGREES
BUN SERPL-MCNC: 46 MG/DL (ref 8–22)
CALCIUM SERPL-MCNC: 8.1 MG/DL (ref 8.5–10.5)
CHLORIDE SERPL-SCNC: 103 MMOL/L (ref 96–112)
CO2 BLDA-SCNC: 24 MMOL/L (ref 32–48)
CO2 SERPL-SCNC: 20 MMOL/L (ref 20–33)
CREAT SERPL-MCNC: 2.42 MG/DL (ref 0.5–1.4)
ERYTHROCYTE [DISTWIDTH] IN BLOOD BY AUTOMATED COUNT: 45.5 FL (ref 35.9–50)
GFR SERPLBLD CREATININE-BSD FMLA CKD-EPI: 28 ML/MIN/1.73 M 2
GLUCOSE BLD STRIP.AUTO-MCNC: 131 MG/DL (ref 65–99)
GLUCOSE BLD STRIP.AUTO-MCNC: 144 MG/DL (ref 65–99)
GLUCOSE BLD STRIP.AUTO-MCNC: 82 MG/DL (ref 65–99)
GLUCOSE SERPL-MCNC: 153 MG/DL (ref 65–99)
HCO3 BLDA-SCNC: 23 MMOL/L (ref 21–28)
HCT VFR BLD AUTO: 18.9 % (ref 42–52)
HCT VFR BLD AUTO: 23.1 % (ref 42–52)
HGB BLD-MCNC: 6.6 G/DL (ref 14–18)
HGB BLD-MCNC: 8.2 G/DL (ref 14–18)
LACTATE BLD-SCNC: 1.5 MMOL/L (ref 0.5–2)
MCH RBC QN AUTO: 30 PG (ref 27–33)
MCHC RBC AUTO-ENTMCNC: 34.9 G/DL (ref 32.3–36.5)
MCV RBC AUTO: 85.9 FL (ref 81.4–97.8)
PCO2 BLDA: 34.4 MMHG (ref 32–48)
PCO2 TEMP ADJ BLDA: 35.5 MMHG (ref 32–48)
PH BLDA: 7.43 [PH] (ref 7.35–7.45)
PH TEMP ADJ BLDA: 7.42 [PH] (ref 7.35–7.45)
PLATELET # BLD AUTO: 136 K/UL (ref 164–446)
PMV BLD AUTO: 12.3 FL (ref 9–12.9)
PO2 BLDA: 55 MMHG (ref 83–108)
PO2 TEMP ADJ BLDA: 58 MMHG (ref 64–87)
POTASSIUM SERPL-SCNC: 4.8 MMOL/L (ref 3.6–5.5)
RBC # BLD AUTO: 2.2 M/UL (ref 4.7–6.1)
SAO2 % BLDA: 89 % (ref 93–99)
SODIUM SERPL-SCNC: 135 MMOL/L (ref 135–145)
SPECIMEN DRAWN FROM PATIENT: ABNORMAL
WBC # BLD AUTO: 21.6 K/UL (ref 4.8–10.8)

## 2024-12-22 PROCEDURE — 85027 COMPLETE CBC AUTOMATED: CPT

## 2024-12-22 PROCEDURE — 36600 WITHDRAWAL OF ARTERIAL BLOOD: CPT

## 2024-12-22 PROCEDURE — 86923 COMPATIBILITY TEST ELECTRIC: CPT | Mod: 91

## 2024-12-22 PROCEDURE — 85014 HEMATOCRIT: CPT

## 2024-12-22 PROCEDURE — 83605 ASSAY OF LACTIC ACID: CPT

## 2024-12-22 PROCEDURE — 80048 BASIC METABOLIC PNL TOTAL CA: CPT

## 2024-12-22 PROCEDURE — 82803 BLOOD GASES ANY COMBINATION: CPT

## 2024-12-22 PROCEDURE — 99233 SBSQ HOSP IP/OBS HIGH 50: CPT | Performed by: INTERNAL MEDICINE

## 2024-12-22 PROCEDURE — 700102 HCHG RX REV CODE 250 W/ 637 OVERRIDE(OP)

## 2024-12-22 PROCEDURE — 99024 POSTOP FOLLOW-UP VISIT: CPT | Performed by: NURSE PRACTITIONER

## 2024-12-22 PROCEDURE — 85018 HEMOGLOBIN: CPT

## 2024-12-22 PROCEDURE — A9270 NON-COVERED ITEM OR SERVICE: HCPCS

## 2024-12-22 PROCEDURE — 36430 TRANSFUSION BLD/BLD COMPNT: CPT

## 2024-12-22 PROCEDURE — 700111 HCHG RX REV CODE 636 W/ 250 OVERRIDE (IP)

## 2024-12-22 PROCEDURE — 700105 HCHG RX REV CODE 258: Performed by: NURSE PRACTITIONER

## 2024-12-22 PROCEDURE — 302172 SOFT BED BELT: Performed by: INTERNAL MEDICINE

## 2024-12-22 PROCEDURE — 770022 HCHG ROOM/CARE - ICU (200)

## 2024-12-22 PROCEDURE — 700101 HCHG RX REV CODE 250: Performed by: NURSE PRACTITIONER

## 2024-12-22 PROCEDURE — 94669 MECHANICAL CHEST WALL OSCILL: CPT

## 2024-12-22 PROCEDURE — 700111 HCHG RX REV CODE 636 W/ 250 OVERRIDE (IP): Mod: JZ | Performed by: NURSE PRACTITIONER

## 2024-12-22 PROCEDURE — P9016 RBC LEUKOCYTES REDUCED: HCPCS

## 2024-12-22 PROCEDURE — 82962 GLUCOSE BLOOD TEST: CPT | Mod: 91

## 2024-12-22 RX ORDER — HALOPERIDOL 5 MG/ML
5 INJECTION INTRAMUSCULAR EVERY 4 HOURS PRN
Status: DISCONTINUED | OUTPATIENT
Start: 2024-12-22 | End: 2024-12-25 | Stop reason: HOSPADM

## 2024-12-22 RX ORDER — HALOPERIDOL 5 MG/ML
5 INJECTION INTRAMUSCULAR ONCE
Status: COMPLETED | OUTPATIENT
Start: 2024-12-22 | End: 2024-12-22

## 2024-12-22 RX ADMIN — METOPROLOL TARTRATE 25 MG: 25 TABLET, FILM COATED ORAL at 17:04

## 2024-12-22 RX ADMIN — HALOPERIDOL LACTATE 5 MG: 5 INJECTION, SOLUTION INTRAMUSCULAR at 01:01

## 2024-12-22 RX ADMIN — ROSUVASTATIN CALCIUM 10 MG: 10 TABLET, FILM COATED ORAL at 17:04

## 2024-12-22 RX ADMIN — HALOPERIDOL LACTATE 5 MG: 5 INJECTION, SOLUTION INTRAMUSCULAR at 03:44

## 2024-12-22 RX ADMIN — FUROSEMIDE 80 MG: 10 INJECTION INTRAMUSCULAR; INTRAVENOUS at 12:04

## 2024-12-22 RX ADMIN — Medication 1 APPLICATOR: at 20:11

## 2024-12-22 RX ADMIN — SENNOSIDES AND DOCUSATE SODIUM 2 TABLET: 50; 8.6 TABLET ORAL at 05:17

## 2024-12-22 RX ADMIN — ACETAMINOPHEN 1000 MG: 500 TABLET ORAL at 23:56

## 2024-12-22 RX ADMIN — Medication 1 APPLICATOR: at 08:20

## 2024-12-22 RX ADMIN — ACETAMINOPHEN 1000 MG: 500 TABLET ORAL at 05:17

## 2024-12-22 RX ADMIN — ENOXAPARIN SODIUM 40 MG: 100 INJECTION SUBCUTANEOUS at 17:04

## 2024-12-22 RX ADMIN — ACETAMINOPHEN 1000 MG: 500 TABLET ORAL at 12:04

## 2024-12-22 RX ADMIN — ASPIRIN 81 MG: 81 TABLET, COATED ORAL at 05:17

## 2024-12-22 RX ADMIN — FENTANYL CITRATE 50 MCG: 50 INJECTION, SOLUTION INTRAMUSCULAR; INTRAVENOUS at 22:33

## 2024-12-22 RX ADMIN — DEXMEDETOMIDINE HYDROCHLORIDE 0.9 MCG/KG/HR: 100 INJECTION, SOLUTION INTRAVENOUS at 03:56

## 2024-12-22 RX ADMIN — ACETAMINOPHEN 1000 MG: 500 TABLET ORAL at 17:04

## 2024-12-22 RX ADMIN — FUROSEMIDE 80 MG: 10 INJECTION INTRAMUSCULAR; INTRAVENOUS at 05:10

## 2024-12-22 RX ADMIN — FUROSEMIDE 80 MG: 10 INJECTION INTRAMUSCULAR; INTRAVENOUS at 17:04

## 2024-12-22 RX ADMIN — MAGNESIUM SULFATE IN DEXTROSE 1 G: 10 INJECTION, SOLUTION INTRAVENOUS at 05:16

## 2024-12-22 RX ADMIN — OXYCODONE 5 MG: 5 TABLET ORAL at 21:26

## 2024-12-22 RX ADMIN — OMEPRAZOLE 20 MG: 20 CAPSULE, DELAYED RELEASE ORAL at 05:17

## 2024-12-22 RX ADMIN — SENNOSIDES AND DOCUSATE SODIUM 2 TABLET: 50; 8.6 TABLET ORAL at 17:04

## 2024-12-22 ASSESSMENT — ENCOUNTER SYMPTOMS
BLOOD IN STOOL: 0
BACK PAIN: 0
SHORTNESS OF BREATH: 0
FLANK PAIN: 0
SPEECH CHANGE: 0
DEPRESSION: 0
EYE REDNESS: 0
DIZZINESS: 1
SENSORY CHANGE: 0
EYE DISCHARGE: 0
BRUISES/BLEEDS EASILY: 0
SORE THROAT: 0
NAUSEA: 0
VOMITING: 0
NECK PAIN: 0
FOCAL WEAKNESS: 0
COUGH: 0
ABDOMINAL PAIN: 0
FEVER: 0
CHILLS: 0
NERVOUS/ANXIOUS: 0

## 2024-12-22 ASSESSMENT — PAIN DESCRIPTION - PAIN TYPE
TYPE: ACUTE PAIN

## 2024-12-22 ASSESSMENT — FIBROSIS 4 INDEX: FIB4 SCORE: 3.14

## 2024-12-22 NOTE — CARE PLAN
The patient is Watcher - Medium risk of patient condition declining or worsening    Shift Goals  Clinical Goals: weanwean 02, monitor neuro status, trend H & H  Patient Goals: comfort and apetite  Family Goals: comfort    Progress made toward(s) clinical / shift goals:    Problem: Knowledge Deficit - Standard  Goal: Patient and family/care givers will demonstrate understanding of plan of care, disease process/condition, diagnostic tests and medications  Outcome: Progressing     Problem: Post Op Day 1 CABG/Heart Valve Replacement  Goal: Optimal care of the post op CABG/heart valve replacement Post Op Day 1  Outcome: Progressing  Intervention: EKG and CXR completed  Note: EKG and CXR completed  Intervention: All valve patients: PT/INR daily  Note: PT/INR daily  Intervention: Antibiotics are discontinued within 24 hours of anesthesia end time unless indication documented for continuation beyond 24 hours  Note: Abx d/c'd  Intervention: Daily weights in the morning  Note: Daily wts in am on standing scale  Intervention: Up in chair for all meals  Note: Pt up in chair for all meals  Intervention: Ambulate in am if stable. First ambulation 25 feet. Repeat x 3 as tolerated  Note: Pt ambulated x2 per shift  Intervention: Discontinue kidd catheter unless documented reason for continuation  Note: Kidd in for strict I & Os  Intervention: Assess surgical dressing and check provider orders for potential removal  Note: Mediastinal dressing removed.   Intervention: IS q 1 hour while awake and record best IS volume  Note: IS Q1, 1500, Best of 4000  Intervention: Knee high LEXA hose, on during the day, off at night  Note: LEXA hose on during day time and removed at night, SCDs now on.   Intervention: Saline lock IV  Note: All line saline locked     Problem: Pain - Standard  Goal: Alleviation of pain or a reduction in pain to the patient’s comfort goal  Outcome: Progressing  Note: Pain assessed using the 0-10 pain scale and medicated  per MAR.  Pt educated on non-pharmacological interventions including repositioning, distraction and heat/cold packs.      Problem: Fall Risk  Goal: Patient will remain free from falls  Outcome: Progressing  Note: Pt educated on importance of calling nurse before getting up. Fall precautions in place. Bed locked in lowest position. Call light and belongings within reach. Wristband and proper sign placed. Bed alarm on. No skid socks applied. Room free of clutter. PT confused and located in room next to nursing station. Family also at bedside to assisst with reorienting.

## 2024-12-22 NOTE — PROGRESS NOTES
Intermittent confusion noted on exam.  Confused on date, some bizarre responses to questions.  APRN notified, received order for ABG.

## 2024-12-22 NOTE — CARE PLAN
The patient is Watcher - Medium risk of patient condition declining or worsening    Shift Goals  Clinical Goals: wean 02, monitor neuro status, trend H & H  Patient Goals: comfort and apetite  Family Goals: comfort    Progress made toward(s) clinical / shift goals:    Problem: Knowledge Deficit - Standard  Goal: Patient and family/care givers will demonstrate understanding of plan of care, disease process/condition, diagnostic tests and medications  Outcome: Progressing     Problem: Post Op Day 1 CABG/Heart Valve Replacement  Goal: Optimal care of the post op CABG/heart valve replacement Post Op Day 1  Outcome: Progressing  Intervention: EKG and CXR completed  Note: EKG and CXR completed  Intervention: All valve patients: PT/INR daily  Note: PT/INR daily  Intervention: Antibiotics are discontinued within 24 hours of anesthesia end time unless indication documented for continuation beyond 24 hours  Note: Abx d/c'd  Intervention: Daily weights in the morning  Note: Daily wts in am on standing scale  Intervention: Up in chair for all meals  Note: Pt up in chair for all meals  Intervention: Ambulate in am if stable. First ambulation 25 feet. Repeat x 3 as tolerated  Note: Pt ambulated x2 per shift  Intervention: Discontinue kidd catheter unless documented reason for continuation  Note: Kidd in for strict I & Os  Intervention: Assess surgical dressing and check provider orders for potential removal  Note: Mediastinal dressing removed.   Intervention: IS q 1 hour while awake and record best IS volume  Note: IS Q1, 1500, Best of 4000  Intervention: Knee high LEXA hose, on during the day, off at night  Note: LEXA hose on during day time and removed at night, SCDs now on.   Intervention: Saline lock IV  Note: All line saline locked     Problem: Pain - Standard  Goal: Alleviation of pain or a reduction in pain to the patient’s comfort goal  Outcome: Progressing  Note: Pain assessed using the 0-10 pain scale and medicated per  MAR.  Pt educated on non-pharmacological interventions including repositioning, distraction and heat/cold packs.      Problem: Fall Risk  Goal: Patient will remain free from falls  Outcome: Progressing  Note: Pt educated on importance of calling nurse before getting up. Fall precautions in place. Bed locked in lowest position. Call light and belongings within reach. Wristband and proper sign placed. Bed alarm on. No skid socks applied. Room free of clutter. PT confused and located in room next to nursing station. Family also at bedside to assisst with reorienting.      Problem: Safety - Medical Restraint  Goal: Remains free of injury from restraints (Restraint for Interference with Medical Device)  Outcome: Progressing  Note: Patient assessed frequently for needs. Circulatory status checked and patient repositioned frequently. Restraint charting and necessity of restraints documented Q2 hours.

## 2024-12-22 NOTE — PROGRESS NOTES
Cardiovascular Surgery Progress Note    Name: Lionel Epperson  MRN: [id number]  : 1955  Admit Date: 2024  5:09 AM  2 Days Post-Op     Procedure:  Procedure(s) and Anesthesia Type:     * AORTIC VALVE REPLACEMENT, ASCENDING AORTIC ANEURYSM REPAIR, AORTIC ROOT REPLACEMENT - General     * REPAIR, ANEURYSM, AORTA, ASCENDING - General     * ECHOCARDIOGRAM, TRANSESOPHAGEAL, INTRAOPERATIVE - General    Vitals:  Vitals:    24 0530 24 0600 24 0630 24 0700   BP: 117/58 94/54 101/54 102/53   Pulse: (!) 54 (!) 52 (!) 54 (!) 53   Resp: (!) 38 (!) 32 (!) 26 19   Temp:   37.6 °C (99.7 °F)    TempSrc:   Bladder    SpO2: 95% 96% 97% 100%   Weight:   99.6 kg (219 lb 9.3 oz)    Height:          Temp (24hrs), Av.7 °C (99.9 °F), Min:37.2 °C (99 °F), Max:37.9 °C (100.2 °F)      Respiratory:    Respiration: 19, Pulse Oximetry: 100 %       Fluids:    Intake/Output Summary (Last 24 hours) at 2024 0802  Last data filed at 2024 0600  Gross per 24 hour   Intake 2552.22 ml   Output 2490 ml   Net 62.22 ml     Admit weight: Weight: 89.2 kg (196 lb 10.4 oz)  Current weight: Weight: 99.6 kg (219 lb 9.3 oz) (24 0630)    Labs:  Recent Labs     24  1300 24  1340 24  0415 24  1137 24  0016 24  0526   WBC  --   --  17.9*  --  21.6*  --    RBC  --   --  2.83*  --  2.20*  --    HEMOGLOBIN 11.9*   < > 8.4* 8.0* 6.6* 8.2*   HEMATOCRIT 35.0*   < > 24.4* 23.3* 18.9* 23.1*   MCV  --   --  86.2  --  85.9  --    MCH  --   --  29.7  --  30.0  --    MCHC  --   --  34.4  --  34.9  --    RDW  --   --  45.1  --  45.5  --    PLATELETCT 184  --  162*  --  136*  --    MPV  --   --  11.3  --  12.3  --     < > = values in this interval not displayed.     Recent Labs     24  0415 24  1137 24  0016   SODIUM 138 135 135   POTASSIUM 5.6* 5.3 4.8   CHLORIDE 109 104 103   CO2 18* 17* 20   GLUCOSE 157* 171* 153*   BUN 29* 36* 46*   CREATININE 1.57* 2.25* 2.42*    CALCIUM 8.2* 8.4* 8.1*     Recent Labs     12/20/24  1300 12/20/24  1340   APTT 32.1  --    INR 1.72* 1.67*       HgbA1c:  5.4    Diabetic Educator Consult:  N/A    Medications:  Scheduled Medications   Medication Dose Frequency    insulin lispro  2-9 Units 4X/DAY ACHS    furosemide  80 mg TID DIURETIC    rosuvastatin  10 mg Q EVENING    enoxaparin (LOVENOX) injection  40 mg DAILY AT 1800    Nozin nasal  swab  1 Applicator BID    metoprolol tartrate  25 mg BID    aspirin  81 mg DAILY    acetaminophen  1,000 mg Q6HRS    senna-docusate  2 Tablet BID    And    polyethylene glycol/lytes  1 Packet DAILY    And    magnesium hydroxide  30 mL DAILY    omeprazole  20 mg DAILY        Exam:   Physical Exam  Vitals and nursing note reviewed.   Constitutional:       General: He is not in acute distress.     Appearance: Normal appearance.   HENT:      Head: Normocephalic.      Right Ear: External ear normal.      Left Ear: External ear normal.      Nose: Nose normal. No congestion.      Mouth/Throat:      Mouth: Mucous membranes are moist.      Pharynx: Oropharynx is clear.   Eyes:      Extraocular Movements: Extraocular movements intact.      Pupils: Pupils are equal, round, and reactive to light.   Cardiovascular:      Rate and Rhythm: Normal rate and regular rhythm.      Pulses: Normal pulses.      Heart sounds: Normal heart sounds.   Pulmonary:      Effort: Pulmonary effort is normal.      Breath sounds: Decreased breath sounds present.   Abdominal:      General: Bowel sounds are decreased. There is no distension.      Palpations: Abdomen is soft.   Musculoskeletal:      Cervical back: Normal range of motion and neck supple. No tenderness.      Right lower leg: Edema present.      Left lower leg: Edema present.   Skin:     General: Skin is warm and dry.      Comments: Midline surgical incision   Neurological:      Mental Status: He is lethargic and confused.      Comments: Recently off sedation at time of exam          Cardiac Medications:    ASA - Yes    Plavix - No; contraindicated because of Other Aortic root    Post-operative Beta Blockers - Yes    Ace/ARB- No; contraindicated because of Normal EF    ARNI-  No; contraindicated because of Normal EF    Statin - No; contraindicated because of No CAD    Aldactone- No; contraindicated because of Normal EF    SGLT2i-  No; contraindicated because of Normal EF    Ejection Fraction:  60%    Telemetry:   12/21 SB  12/22 SB/SR    Assessment/Plan:  POD 1  HDS, SB, neuro intact, wounds intact, abdomen soft, fluid balance positive, wt up, 2 L NC. H/H 8.4/24.4, K 5.6, 1.57. Plan:  Diurese. Monitor renal function and h/h. Keep chest tubes, pacing wires, and kidd. IS/ambulate.     POD 2  HDS, SB/SR, confused and agitated- dex and haldol overnight, wounds intact, abdomen soft, fluid balance positive, wt up,  4 L NC. H/H 6.6/18.9 -received 2 units RBCs overnight. Cr 2.42, adequate UOP. Plan: Wean dex, mobilize, reorient.  Continue aggressive diuresis, keep kidd for strict I/O. Keep chest tubes and pacing wires. IS/ambulate.     Disposition:  D

## 2024-12-22 NOTE — ASSESSMENT & PLAN NOTE
Suspect metabolic with DAVE and acute blood loss anemia  Precedex for RASS goals -1 to +1, weaned off 12/22  Resume titrating Precedex for RASS goals as needed  Limit sedatives and ICU stimuli  Maintain activity levels during the day and limit stimulation at night    LOC better again but still confused about some issues, oriented x 3-4  Okay to DC sitter

## 2024-12-22 NOTE — PROGRESS NOTES
Discussed plan of care with JONATHAN Nash. Pt is confused and in restraints. Plan to stop dex infusion and mobilize patient (up and out of bed with lights on during the day). Wean oxygen as tolerated. VSS.

## 2024-12-22 NOTE — PROGRESS NOTES
Critical Care Progress Note    Date of admission  12/20/2024    Chief Complaint  69 y.o. male admitted 12/20/2024 with severe aortic stenosis s/p AVR    Hospital Course  Mr. Epperson is a 69 y.o. male with the past medical history significant for hypertension, hyperlipidemia, aortic stenosis due to bicuspid aortic valve, and ascending aortic enlargement who was sent to cardiothoracic, Dr. Mina, for consideration of valve replacement due to worsening dizziness with position changes.  The patient is active at baseline.  Cardiothoracic recommended aortic valve replacement with aortic aneurysm repair.  The patient was taken to the operating room on 12/20/2024 where he underwent elective aortic valve replacement with coronary artery reimplantation.  The surgery itself was uncomplicated; however, when the patient was coming off pump he was found to have a mild bleed that was repaired by Dr. Rizvi and received 1 unit of platelets due to oozing.  The patient also went into ventricular tachycardia coming off pump requiring 300mg of amiodarone and 100 mg of lidocaine.  He was then 100% paced when he was brought up to the ICU.  While getting settled in the ICU it was noted that his chest tubes had already put out close to 300 cc of bright red blood along with increasing Levophed requirements.  The patient was given boluses of IV fluids as well as 2 units of FFP and cryoprecipitate.  The critical care team was consulted to assist with postoperative management.     Interval Problem Update  Reviewed last 24 hour events:   - POD#2   - Hb of 6.6 and received another 2 units pRBCs   - pt became delirious overnight requiring Haldol and then started on Precedex   - sedated this morning-->precedex 0.9   - stopped Precedex this morning   - ambulated to new room, anxious to walk more   - still confused, A/Ox3   - SB 50s   - SBP 90-100s   - cardiac diet   - UOP of 1450cc overnight, kidd   - BM pta   - chest tubes with 240cc  overnight   - up to chair and walked   - PEP   - IS 1500cc   - SCDs   - PPI   - Hb 8.2   - WBCs 21   - platelets 136   - K 4.8   - Creat 2.42    Yesterday's Events:   - continued bleeding and received another round of 2 units FFP, 1 platelets, and 1 cryo   - Hb of 8.3-->transfused 1 unit pRBCs   - eventually received 1g CaCl and Factor VII   - repeat Hb of 8.3-->addition 1 unit of pRBCs   - vasopressors titrated down   - extubated at 11 pm   - POD#1   - a/ox4   - up to chair   -minor chest pain    - SR/SB 50-60s   - SBP 90-110s   - off eop and levo   - insulin at 1 unit/hr   - no pacing   - Tmax 37.3   - on room air   - ISS 1200cc   - UOP of 500cc overnight, Zamora   - BM pta   - right IJ DBL, right IJ TLC   -    - chest tubes 590cc ovenright   - PEP QID   - CXR(reviewed): cephalization noted   - lovenox   - PPI   - Hb 8.4   - platelets 162   - K 5.6   - creat 1.57      Review of Systems  Review of Systems   Constitutional:  Positive for malaise/fatigue. Negative for chills and fever.   HENT:  Negative for congestion and sore throat.    Eyes:  Negative for discharge and redness.   Respiratory:  Negative for cough and shortness of breath.    Cardiovascular:  Negative for chest pain and leg swelling.   Gastrointestinal:  Negative for abdominal pain, blood in stool, nausea and vomiting.   Genitourinary:  Negative for flank pain and hematuria.   Musculoskeletal:  Negative for back pain and neck pain.   Skin:  Negative for rash.   Neurological:  Positive for dizziness. Negative for sensory change, speech change and focal weakness.   Endo/Heme/Allergies:  Does not bruise/bleed easily.   Psychiatric/Behavioral:  Negative for depression. The patient is not nervous/anxious.         Vital Signs for last 24 hours   Temp:  [37.2 °C (99 °F)-37.9 °C (100.2 °F)] 37.8 °C (100 °F)  Pulse:  [52-80] 52  Resp:  [14-35] 15  BP: ()/(42-67) 118/55  SpO2:  [88 %-100 %] 95 %    Hemodynamic parameters for last 24 hours  CVP:  [210  MM HG] 210 MM HG    Respiratory Information for the last 24 hours       Physical Exam   Physical Exam  Vitals and nursing note reviewed.   Constitutional:       General: He is not in acute distress.     Appearance: He is ill-appearing. He is not toxic-appearing.   HENT:      Head: Normocephalic and atraumatic.      Right Ear: External ear normal.      Left Ear: External ear normal.      Nose: Nose normal. No rhinorrhea.      Mouth/Throat:      Mouth: Mucous membranes are moist.      Pharynx: Oropharynx is clear. No oropharyngeal exudate.   Eyes:      General: No scleral icterus.     Conjunctiva/sclera: Conjunctivae normal.      Pupils: Pupils are equal, round, and reactive to light.   Neck:      Comments: Right IJ TLC  Cardiovascular:      Rate and Rhythm: Normal rate and regular rhythm.      Pulses:           Dorsalis pedis pulses are 1+ on the right side and 1+ on the left side.      Heart sounds: Heart sounds are distant. No murmur heard.  Pulmonary:      Breath sounds: No wheezing.      Comments: chest tubes in place  Chest:      Chest wall: No tenderness.   Abdominal:      Palpations: Abdomen is soft.      Tenderness: There is no abdominal tenderness. There is no guarding or rebound.   Genitourinary:     Comments: Zamora in place  Musculoskeletal:         General: Normal range of motion.      Cervical back: Normal range of motion and neck supple.      Right lower leg: No edema.      Left lower leg: No edema.   Lymphadenopathy:      Cervical: No cervical adenopathy.   Skin:     General: Skin is warm and dry.      Capillary Refill: Capillary refill takes less than 2 seconds.      Findings: No rash.   Neurological:      Mental Status: He is alert and oriented to person, place, and time.      Cranial Nerves: No cranial nerve deficit.      Sensory: No sensory deficit.      Motor: No weakness.   Psychiatric:         Mood and Affect: Mood normal.         Behavior: Behavior normal.         Thought Content: Thought  content normal.         Medications  Current Facility-Administered Medications   Medication Dose Route Frequency Provider Last Rate Last Admin    haloperidol lactate (Haldol) injection 5 mg  5 mg Intravenous Q4HRS PRN Yolande Nash A.P.R.N.   5 mg at 12/22/24 0101    insulin lispro (HumaLOG,AdmeLOG) subcutaneous injection  2-9 Units Subcutaneous 4X/DAY ACHS Yolande Nash, A.P.R.N.        And    dextrose 50% (D50W) injection 25 g  25 g Intravenous Q15 MIN PRN Yolande Nash A.P.R.N.        furosemide (Lasix) injection 80 mg  80 mg Intravenous TID DIURETIC MAURICE Berger.P.R.N.   80 mg at 12/22/24 0510    dexmedetomidine (Precedex) 400 mcg/100mL infusion  0.1-1.5 mcg/kg/hr (Ideal) Intravenous Continuous CATHLEEN BergerP.R.N. 18 mL/hr at 12/22/24 0432 0.9 mcg/kg/hr at 12/22/24 0432    norepinephrine (Levophed) 8 mg in 250 mL NS infusion (premix)  0-1 mcg/kg/min (Ideal) Intravenous Continuous Harris Mina M.D.   Stopped at 12/21/24 0703    rosuvastatin (Crestor) tablet 10 mg  10 mg Oral Q EVENING DIONNA Smith-CThad   10 mg at 12/21/24 1726    Respiratory Therapy Consult   Nebulization Continuous RT DIONNA Smith-CThad        enoxaparin (Lovenox) inj 40 mg  40 mg Subcutaneous DAILY AT 1800 DIONNA Smith-CThad   40 mg at 12/21/24 1751    Nozin nasal  swab  1 Applicator Each Nostril BID DIONNA Smith-CThad   1 Applicator at 12/21/24 2035    calcium CHLORIDE 10 % 1,000 mg in  mL IVPB  1,000 mg Intravenous Once PRN KVNG SmithAThad-CThad        metoprolol tartrate (Lopressor) tablet 25 mg  25 mg Oral BID KVNG SmithAThad-CThad        aspirin EC tablet 81 mg  81 mg Oral DAILY KVNG SmithAThad-C.   81 mg at 12/22/24 0517    acetaminophen (Tylenol) tablet 1,000 mg  1,000 mg Oral Q6HRS DIONNA Smith-JEFF.   1,000 mg at 12/22/24 0517    Followed by    [START ON 12/30/2024] acetaminophen (Tylenol) tablet 1,000 mg  1,000 mg Oral Q6HRS PRN Alejandro Nash P.A.-C.        oxyCODONE  immediate-release (Roxicodone) tablet 5 mg  5 mg Oral Q3HRS PRN KVNG SmithA.-C.   5 mg at 12/21/24 1818    Or    oxyCODONE immediate release (Roxicodone) tablet 10 mg  10 mg Oral Q3HRS PRN LIZY Smith.A.-C.   10 mg at 12/21/24 0350    Or    fentaNYL (Sublimaze) injection 50 mcg  50 mcg Intravenous Q3HRS PRN LIZY Smith.A.-C.        ondansetron (Zofran) syringe/vial injection 8 mg  8 mg Intravenous Q6HRS PRN LIZY Smith.A.-C.   8 mg at 12/21/24 0632    Or    prochlorperazine (Compazine) injection 10 mg  10 mg Intravenous Q6HRS PRN LIZY Smith.A.-C.        acetaminophen (Tylenol) tablet 650 mg  650 mg Oral Q4HRS PRN DIONNA Smith-C.        Or    acetaminophen (Tylenol) suppository 650 mg  650 mg Rectal Q4HRS PRN KVNG SmithA.-C.        senna-docusate (Pericolace Or Senokot S) 8.6-50 MG per tablet 2 Tablet  2 Tablet Oral BID Alejandro Nash P.A.-C.   2 Tablet at 12/22/24 0517    And    polyethylene glycol/lytes (Miralax) Packet 1 Packet  1 Packet Oral DAILY DIONNA Smith-C.   1 Packet at 12/21/24 0608    And    magnesium hydroxide (Milk Of Magnesia) suspension 30 mL  30 mL Oral DAILY Alejandro Nash P.A.-C.        And    bisacodyl (Dulcolax) suppository 10 mg  10 mg Rectal QDAY PRN KVNG SmithA.-C.        omeprazole (PriLOSEC) capsule 20 mg  20 mg Oral DAILY KVNG SmithAThad-C.   20 mg at 12/22/24 0517    mag hydrox-al hydrox-simeth (Maalox Plus Es Or Mylanta Ds) suspension 30 mL  30 mL Oral Q4HRS PRN DIONNA Smith-C.           Fluids    Intake/Output Summary (Last 24 hours) at 12/22/2024 0628  Last data filed at 12/22/2024 0435  Gross per 24 hour   Intake 3060.05 ml   Output 2030 ml   Net 1030.05 ml       Laboratory  Recent Labs     12/20/24  1700 12/20/24  1842 12/20/24  2301 12/21/24  1634 12/22/24  0521   ISTATAPH 7.335* 7.369 7.412 7.389 7.432   ISTATAPCO2 31.0* 28.9* 27.8* 30.3* 34.4   ISTATAPO2 81* 90 86 53* 55*   ISTATATCO2 17* 18* 19* 19* 24*   XPIZVZH0SNL 95 97 97  87* 89*   ISTATARTHCO3 16.5* 16.7* 17.7* 18.3* 23.0   ISTATARTBE -9* -8* -6* -6* -1   ISTATTEMP 36.3 C 36.9 C 37.8 C 97.8 F 37.7 C   ISTATFIO2 30 30 30  --   --    ISTATSPEC Arterial Arterial Arterial Arterial Arterial   ISTATAPHTC 7.345* 7.371 7.401 7.395 7.422   [id number] 77 89* 91* 51* 58*         Recent Labs     12/20/24  1300 12/20/24  1735 12/21/24  0415 12/21/24  1137 12/22/24  0016   SODIUM  --   --  138 135 135   POTASSIUM 4.5   < > 5.6* 5.3 4.8   CHLORIDE  --   --  109 104 103   CO2  --   --  18* 17* 20   BUN  --   --  29* 36* 46*   CREATININE  --   --  1.57* 2.25* 2.42*   MAGNESIUM 4.3*  --   --   --   --    PHOSPHORUS  --   --   --  6.7*  --    CALCIUM  --   --  8.2* 8.4* 8.1*    < > = values in this interval not displayed.     Recent Labs     12/21/24  0415 12/21/24  1137 12/22/24  0016   GLUCOSE 157* 171* 153*     Recent Labs     12/21/24  0415 12/22/24  0016   WBC 17.9* 21.6*     Recent Labs     12/20/24  1300 12/20/24  1340 12/20/24  1735 12/21/24  0415 12/21/24  1137 12/22/24  0016 12/22/24  0526   RBC  --   --   --  2.83*  --  2.20*  --    HEMOGLOBIN 11.9* 8.3*   < > 8.4* 8.0* 6.6* 8.2*   HEMATOCRIT 35.0* 24.7*   < > 24.4* 23.3* 18.9* 23.1*   PLATELETCT 184  --   --  162*  --  136*  --    PROTHROMBTM 20.2* 19.8*  --   --   --   --   --    APTT 32.1  --   --   --   --   --   --    INR 1.72* 1.67*  --   --   --   --   --     < > = values in this interval not displayed.       Imaging  Reviewed    Assessment/Plan  * Severe aortic stenosis- (present on admission)  Assessment & Plan  Due to bicuspid aortic valve  S/p AVR with Dr. Mina on 12/20  Hemodynamics per CVS  SBP goals > 90 and < 120  S/p vasopressors  Monitor chest tube output for bleeding  Serial Hb and BMP    DAVE (acute kidney injury) (HCC)  Assessment & Plan  ?due to ischemic ATN after acute blood loss and hypotension  Lasix ongoing  Avoid nephrotoxins  Monitor UOP/creat    Acute encephalopathy  Assessment & Plan  Suspect metabolic with  DAVE and acute blood loss anemia  Precedex for RASS goals -1 to +1  I am actively titrating Precedex for RASS goals  Limit sedatives  Maintain activity levels during the day and limit stimulation at night    Encounter for weaning from ventilator (HCC)  Assessment & Plan  Intubated for AVR on 12/20  Extubated later in the evening  RT/O2 protocols  IS/early mobility       Acute blood loss anemia- (present on admission)  Assessment & Plan  Due to OR and AVR   S/p 4 units FFP, 2 platelets, 2 cryo, CaCl 1g, and factor VII on 12/20  S/p 2 units pRBCs on 12/20, 2 units of pRBCs on 12/21  Serial Hb/Hct  Transfuse for Hb< 8  Continue to monitor chest tube output     Aneurysm of ascending aorta without rupture (HCC)- (present on admission)  Assessment & Plan  Only 4.4cm and not repaired      Pure hypercholesterolemia- (present on admission)  Assessment & Plan  Cont home crestor 10mg PO daily     Essential hypertension- (present on admission)  Assessment & Plan  Holding BP medications today    Bicuspid aortic valve- (present on admission)  Assessment & Plan  S/p AVR on 12/20         VTE:  Lovenox  Ulcer: PPI  Lines: Central Line  Ongoing indication addressed, Arterial Line  D/C'd today, and Zamora Catheter  Ongoing indication addressed    I have performed a physical exam and reviewed and updated ROS and Plan today (12/22/2024). In review of yesterday's note (12/21/2024), there are no changes except as documented above.     Discussed patient condition and risk of morbidity and/or mortality with Family, RN, RT, Pharmacy, Charge nurse / hot rounds, Patient, and CVS    Patient had acute delirium overnight requiring Precedex infusion to help control his agitation and restlessness.  The Precedex was weaned off this morning and the patient is still confused but more cooperative and participating in ambulation and activities.  He still has a moderate amount of chest tube output and will continue to monitor hemoglobin.  We will continue  to follow along with cardiothoracic team while he is in the ICU.

## 2024-12-22 NOTE — ASSESSMENT & PLAN NOTE
?due to ischemic ATN after acute blood loss and hypotension  Lasix ongoing  Avoid nephrotoxins, renally dose medications as clinically appropriate  Monitor UOP Zamora catheter/serial BMP    Output remains good  Renal function improved again  May be able to DC Lasix

## 2024-12-22 NOTE — PROGRESS NOTES
Charity notified for increasing confusion. Pt now A & o x 2, unaware of situation or time. Pt continuing to get out of bed without calling despite frequent education, re-orientation, and assisstance from wife to help re-orient at bedside. VS stable and unchanged. Precedex ordered and initiated. Pt refusing dressing change and unable to maintain sterile technique, will re-attempt in AM     0043 H 6.6 HCT 18.9, 160 out CT since 2000, BP 84/42, HR 54 will titrate down on precedex. Charity notified.    0100 Pt becoming aggressive when attempting to administer oral tylenol, pt  taking nasal cannula and attempting to push against RN to get out of bed. Bilat soft wrist restraints applied. Charity notified, 5 mg Haldol given, per Charity ok to increase precedex infusion to 1.    0244 Pt still attempting to exit bed despite sedation and education, RASS +1, RASS +2-+3 with stimulation, unable to change dressing and d/c double lumen R IJ.

## 2024-12-23 PROBLEM — D69.6 THROMBOCYTOPENIA (HCC): Status: ACTIVE | Noted: 2024-12-23

## 2024-12-23 LAB
ANION GAP SERPL CALC-SCNC: 10 MMOL/L (ref 7–16)
BARCODED ABORH UBTYP: 1700
BARCODED PRD CODE UBPRD: NORMAL
BARCODED UNIT NUM UBUNT: NORMAL
BUN SERPL-MCNC: 40 MG/DL (ref 8–22)
CALCIUM SERPL-MCNC: 8 MG/DL (ref 8.5–10.5)
CHLORIDE SERPL-SCNC: 99 MMOL/L (ref 96–112)
CO2 SERPL-SCNC: 27 MMOL/L (ref 20–33)
COMPONENT R 8504R: NORMAL
CREAT SERPL-MCNC: 1.5 MG/DL (ref 0.5–1.4)
EKG IMPRESSION: NORMAL
ERYTHROCYTE [DISTWIDTH] IN BLOOD BY AUTOMATED COUNT: 45.7 FL (ref 35.9–50)
GFR SERPLBLD CREATININE-BSD FMLA CKD-EPI: 50 ML/MIN/1.73 M 2
GLUCOSE SERPL-MCNC: 115 MG/DL (ref 65–99)
HCT VFR BLD AUTO: 21.1 % (ref 42–52)
HCT VFR BLD AUTO: 28.6 % (ref 42–52)
HGB BLD-MCNC: 7.1 G/DL (ref 14–18)
HGB BLD-MCNC: 9.7 G/DL (ref 14–18)
MCH RBC QN AUTO: 29 PG (ref 27–33)
MCHC RBC AUTO-ENTMCNC: 33.6 G/DL (ref 32.3–36.5)
MCV RBC AUTO: 86.1 FL (ref 81.4–97.8)
PLATELET # BLD AUTO: 141 K/UL (ref 164–446)
PMV BLD AUTO: 12.1 FL (ref 9–12.9)
POTASSIUM SERPL-SCNC: 3.7 MMOL/L (ref 3.6–5.5)
PRODUCT TYPE UPROD: NORMAL
RBC # BLD AUTO: 2.45 M/UL (ref 4.7–6.1)
SODIUM SERPL-SCNC: 136 MMOL/L (ref 135–145)
UNIT STATUS USTAT: NORMAL
WBC # BLD AUTO: 18.3 K/UL (ref 4.8–10.8)

## 2024-12-23 PROCEDURE — 86923 COMPATIBILITY TEST ELECTRIC: CPT

## 2024-12-23 PROCEDURE — 700111 HCHG RX REV CODE 636 W/ 250 OVERRIDE (IP): Mod: JZ

## 2024-12-23 PROCEDURE — 770022 HCHG ROOM/CARE - ICU (200)

## 2024-12-23 PROCEDURE — 93005 ELECTROCARDIOGRAM TRACING: CPT | Mod: TC | Performed by: NURSE PRACTITIONER

## 2024-12-23 PROCEDURE — 700102 HCHG RX REV CODE 250 W/ 637 OVERRIDE(OP): Performed by: NURSE PRACTITIONER

## 2024-12-23 PROCEDURE — 36430 TRANSFUSION BLD/BLD COMPNT: CPT

## 2024-12-23 PROCEDURE — 85014 HEMATOCRIT: CPT

## 2024-12-23 PROCEDURE — 99024 POSTOP FOLLOW-UP VISIT: CPT | Performed by: NURSE PRACTITIONER

## 2024-12-23 PROCEDURE — 85027 COMPLETE CBC AUTOMATED: CPT

## 2024-12-23 PROCEDURE — 99233 SBSQ HOSP IP/OBS HIGH 50: CPT | Performed by: INTERNAL MEDICINE

## 2024-12-23 PROCEDURE — 700102 HCHG RX REV CODE 250 W/ 637 OVERRIDE(OP)

## 2024-12-23 PROCEDURE — 93010 ELECTROCARDIOGRAM REPORT: CPT | Performed by: STUDENT IN AN ORGANIZED HEALTH CARE EDUCATION/TRAINING PROGRAM

## 2024-12-23 PROCEDURE — 97165 OT EVAL LOW COMPLEX 30 MIN: CPT

## 2024-12-23 PROCEDURE — 94669 MECHANICAL CHEST WALL OSCILL: CPT

## 2024-12-23 PROCEDURE — A9270 NON-COVERED ITEM OR SERVICE: HCPCS | Performed by: NURSE PRACTITIONER

## 2024-12-23 PROCEDURE — 85018 HEMOGLOBIN: CPT

## 2024-12-23 PROCEDURE — 80048 BASIC METABOLIC PNL TOTAL CA: CPT

## 2024-12-23 PROCEDURE — P9016 RBC LEUKOCYTES REDUCED: HCPCS

## 2024-12-23 PROCEDURE — 700111 HCHG RX REV CODE 636 W/ 250 OVERRIDE (IP): Performed by: NURSE PRACTITIONER

## 2024-12-23 PROCEDURE — 97535 SELF CARE MNGMENT TRAINING: CPT

## 2024-12-23 PROCEDURE — A9270 NON-COVERED ITEM OR SERVICE: HCPCS

## 2024-12-23 RX ORDER — FUROSEMIDE 10 MG/ML
40 INJECTION INTRAMUSCULAR; INTRAVENOUS
Status: DISCONTINUED | OUTPATIENT
Start: 2024-12-23 | End: 2024-12-25 | Stop reason: HOSPADM

## 2024-12-23 RX ORDER — POTASSIUM CHLORIDE 1500 MG/1
40 TABLET, EXTENDED RELEASE ORAL 2 TIMES DAILY
Status: DISCONTINUED | OUTPATIENT
Start: 2024-12-23 | End: 2024-12-25 | Stop reason: HOSPADM

## 2024-12-23 RX ADMIN — Medication 1 APPLICATOR: at 20:17

## 2024-12-23 RX ADMIN — ASPIRIN 81 MG: 81 TABLET, COATED ORAL at 06:25

## 2024-12-23 RX ADMIN — ACETAMINOPHEN 1000 MG: 500 TABLET ORAL at 17:55

## 2024-12-23 RX ADMIN — ACETAMINOPHEN 1000 MG: 500 TABLET ORAL at 23:29

## 2024-12-23 RX ADMIN — POTASSIUM CHLORIDE 40 MEQ: 20 TABLET, EXTENDED RELEASE ORAL at 08:55

## 2024-12-23 RX ADMIN — ENOXAPARIN SODIUM 40 MG: 100 INJECTION SUBCUTANEOUS at 17:58

## 2024-12-23 RX ADMIN — OMEPRAZOLE 20 MG: 20 CAPSULE, DELAYED RELEASE ORAL at 06:26

## 2024-12-23 RX ADMIN — ACETAMINOPHEN 1000 MG: 500 TABLET ORAL at 06:24

## 2024-12-23 RX ADMIN — SENNOSIDES AND DOCUSATE SODIUM 2 TABLET: 50; 8.6 TABLET ORAL at 06:24

## 2024-12-23 RX ADMIN — METOPROLOL TARTRATE 25 MG: 25 TABLET, FILM COATED ORAL at 06:25

## 2024-12-23 RX ADMIN — ACETAMINOPHEN 1000 MG: 500 TABLET ORAL at 12:14

## 2024-12-23 RX ADMIN — POTASSIUM CHLORIDE 40 MEQ: 20 TABLET, EXTENDED RELEASE ORAL at 17:56

## 2024-12-23 RX ADMIN — ROSUVASTATIN CALCIUM 10 MG: 10 TABLET, FILM COATED ORAL at 17:57

## 2024-12-23 RX ADMIN — Medication 1 APPLICATOR: at 08:56

## 2024-12-23 RX ADMIN — FUROSEMIDE 80 MG: 10 INJECTION INTRAMUSCULAR; INTRAVENOUS at 06:28

## 2024-12-23 RX ADMIN — MAGNESIUM HYDROXIDE 30 ML: 1200 LIQUID ORAL at 06:26

## 2024-12-23 RX ADMIN — FUROSEMIDE 40 MG: 10 INJECTION INTRAMUSCULAR; INTRAVENOUS at 16:25

## 2024-12-23 RX ADMIN — POLYETHYLENE GLYCOL 3350 1 PACKET: 17 POWDER, FOR SOLUTION ORAL at 06:26

## 2024-12-23 ASSESSMENT — PAIN DESCRIPTION - PAIN TYPE
TYPE: SURGICAL PAIN
TYPE: ACUTE PAIN
TYPE: SURGICAL PAIN
TYPE: ACUTE PAIN

## 2024-12-23 ASSESSMENT — ENCOUNTER SYMPTOMS
DEPRESSION: 0
VOMITING: 0
ABDOMINAL PAIN: 0
NECK PAIN: 0
BLOOD IN STOOL: 0
EYE DISCHARGE: 0
FLANK PAIN: 0
EYE REDNESS: 0
SORE THROAT: 0
DIZZINESS: 1
FOCAL WEAKNESS: 0
BRUISES/BLEEDS EASILY: 0
SPEECH CHANGE: 0
CHILLS: 0
SHORTNESS OF BREATH: 0
FEVER: 0
COUGH: 0
BACK PAIN: 0
NERVOUS/ANXIOUS: 0
SENSORY CHANGE: 0
NAUSEA: 0

## 2024-12-23 ASSESSMENT — COGNITIVE AND FUNCTIONAL STATUS - GENERAL
HELP NEEDED FOR BATHING: A LITTLE
TOILETING: A LITTLE
DAILY ACTIVITIY SCORE: 19
DRESSING REGULAR LOWER BODY CLOTHING: A LITTLE
PERSONAL GROOMING: A LITTLE
SUGGESTED CMS G CODE MODIFIER DAILY ACTIVITY: CK
DRESSING REGULAR UPPER BODY CLOTHING: A LITTLE

## 2024-12-23 ASSESSMENT — ACTIVITIES OF DAILY LIVING (ADL): TOILETING: INDEPENDENT

## 2024-12-23 ASSESSMENT — FIBROSIS 4 INDEX: FIB4 SCORE: 3.02

## 2024-12-23 NOTE — CARE PLAN
The patient is Watcher - Medium risk of patient condition declining or worsening    Shift Goals  Clinical Goals: weanwean 02, monitor neuro status, trend H & H  Patient Goals: comfort and apetite  Family Goals: comfort    Progress made toward(s) clinical / shift goals:    Problem: Post op day 2 CABG/Heart Valve Replacement  Goal: Optimal care of the post op CABG/heart valve replacement post op day 2  Outcome: Progressing  Intervention: FSBS: when 2 consecutive BS < 130 after post op day 2, discontinue FSBS unless patient is insulin dependent diabetic  Note: Completed per protocol.   Intervention: Daily weights in the morning  Note: Completed by noc RN   Intervention: Up in chair for all meals  Note: Pt has been up to chair since 0800, will get back to bed after dinner.   Intervention: Ambulate 4 times daily, increasing the distance each time  Note: Pt ambulating 1 lap around unit with FWW and 1-2 person assist. Pt has had 3 walks today.   Intervention: Stand at sink and wash up with assistance.  Clean incisions twice daily with soap and water.  Note: Bed bath completed by CNA and RN. Incision care done. Pt stood at sink and brushed teeth.   Intervention: IS q 1 hour while awake and record best IS volume  Note: 1250  Intervention: Consider pacer wire removal by MD  Note: Not completed.   Intervention: Consider removal of kidd and chest tube if not already done  Note: Kidd to stay in place for diuresis and I/Os. CT to stay for high output      Problem: Pain - Standard  Goal: Alleviation of pain or a reduction in pain to the patient’s comfort goal  Outcome: Progressing       Patient is not progressing towards the following goals:

## 2024-12-23 NOTE — PROGRESS NOTES
Cardiovascular Surgery Progress Note    Name: Lionel Epperson  MRN: [id number]  : 1955  Admit Date: 2024  5:09 AM  3 Days Post-Op     Procedure:  Procedure(s) and Anesthesia Type:     * AORTIC VALVE REPLACEMENT, ASCENDING AORTIC ANEURYSM REPAIR, AORTIC ROOT REPLACEMENT - General     * REPAIR, ANEURYSM, AORTA, ASCENDING - General     * ECHOCARDIOGRAM, TRANSESOPHAGEAL, INTRAOPERATIVE - General    Vitals:  Vitals:    24 0400 24 0500 24 0600 24 0649   BP: 129/65 131/66 132/62    Pulse: (!) 57 62 64 66   Resp: 17 (!) 26 (!) 26 20   Temp: 36.9 °C (98.4 °F)      TempSrc: Temporal      SpO2: 97% 94% 95% 93%   Weight:   91.7 kg (202 lb 2.6 oz)    Height:          Temp (24hrs), Av °C (98.6 °F), Min:36.7 °C (98 °F), Max:37.5 °C (99.5 °F)      Respiratory:    Respiration: 20, Pulse Oximetry: 93 %       Fluids:    Intake/Output Summary (Last 24 hours) at 2024 0734  Last data filed at 2024 0600  Gross per 24 hour   Intake 718.32 ml   Output 4500 ml   Net -3781.68 ml     Admit weight: Weight: 89.2 kg (196 lb 10.4 oz)  Current weight: Weight: 91.7 kg (202 lb 2.6 oz) (24 0600)    Labs:  Recent Labs     24  0415 24  1137 24  0016 24  0526 24  0114   WBC 17.9*  --  21.6*  --  18.3*   RBC 2.83*  --  2.20*  --  2.45*   HEMOGLOBIN 8.4*   < > 6.6* 8.2* 7.1*   HEMATOCRIT 24.4*   < > 18.9* 23.1* 21.1*   MCV 86.2  --  85.9  --  86.1   MCH 29.7  --  30.0  --  29.0   MCHC 34.4  --  34.9  --  33.6   RDW 45.1  --  45.5  --  45.7   PLATELETCT 162*  --  136*  --  141*   MPV 11.3  --  12.3  --  12.1    < > = values in this interval not displayed.     Recent Labs     24  1137 24  0016 24  0114   SODIUM 135 135 136   POTASSIUM 5.3 4.8 3.7   CHLORIDE 104 103 99   CO2 17* 20 27   GLUCOSE 171* 153* 115*   BUN 36* 46* 40*   CREATININE 2.25* 2.42* 1.50*   CALCIUM 8.4* 8.1* 8.0*     Recent Labs     24  1300 24  1340   APTT 32.1  --     INR 1.72* 1.67*       HgbA1c:  5.4    Diabetic Educator Consult:  N/A    Medications:  Scheduled Medications   Medication Dose Frequency    furosemide  80 mg TID DIURETIC    rosuvastatin  10 mg Q EVENING    enoxaparin (LOVENOX) injection  40 mg DAILY AT 1800    Nozin nasal  swab  1 Applicator BID    metoprolol tartrate  25 mg BID    aspirin  81 mg DAILY    acetaminophen  1,000 mg Q6HRS    senna-docusate  2 Tablet BID    And    polyethylene glycol/lytes  1 Packet DAILY    And    magnesium hydroxide  30 mL DAILY    omeprazole  20 mg DAILY        Exam:   Physical Exam  Vitals and nursing note reviewed.   Constitutional:       General: He is not in acute distress.     Appearance: Normal appearance.   HENT:      Head: Normocephalic.      Right Ear: External ear normal.      Left Ear: External ear normal.      Nose: Nose normal. No congestion.      Mouth/Throat:      Mouth: Mucous membranes are moist.      Pharynx: Oropharynx is clear.   Eyes:      Extraocular Movements: Extraocular movements intact.      Pupils: Pupils are equal, round, and reactive to light.   Cardiovascular:      Rate and Rhythm: Normal rate and regular rhythm.      Pulses: Normal pulses.      Heart sounds: Normal heart sounds.   Pulmonary:      Effort: Pulmonary effort is normal.      Breath sounds: Decreased breath sounds present.   Abdominal:      General: Bowel sounds are decreased. There is no distension.      Palpations: Abdomen is soft.   Musculoskeletal:      Cervical back: Normal range of motion and neck supple. No tenderness.      Right lower leg: Edema present.      Left lower leg: Edema present.   Skin:     General: Skin is warm and dry.      Comments: Midline surgical incision   Neurological:      Mental Status: He is lethargic and confused.      Comments: Recently off sedation at time of exam         Cardiac Medications:    ASA - Yes    Plavix - No; contraindicated because of Other Aortic root    Post-operative Beta Blockers -  No; contraindicated because of Sinus bradycardia/heart block and Other junctional rhythm    Ace/ARB- No; contraindicated because of Normal EF    ARNI-  No; contraindicated because of Normal EF    Statin - No; contraindicated because of No CAD    Aldactone- No; contraindicated because of Normal EF    SGLT2i-  No; contraindicated because of Normal EF    Ejection Fraction:  60%    Telemetry:   12/21 SB  12/22 SB/SR  12/23 junctional    Assessment/Plan:  POD 1  HDS, SB, neuro intact, wounds intact, abdomen soft, fluid balance positive, wt up, 2 L NC. H/H 8.4/24.4, K 5.6, 1.57. Plan:  Diurese. Monitor renal function and h/h. Keep chest tubes, pacing wires, and kidd. IS/ambulate.     POD 2  HDS, SB/SR, confused and agitated- dex and haldol overnight, wounds intact, abdomen soft, fluid balance positive, wt up,  4 L NC. H/H 6.6/18.9 -received 2 units RBCs overnight. Cr 2.42, adequate UOP. Plan: Wean dex, mobilize, reorient.  Continue aggressive diuresis, keep kidd for strict I/O. Keep chest tubes and pacing wires. IS/ambulate.     POD 3  HDS, junctional 60s, neuro intact- agitation improved- tele sitter in place, wounds intact, abdomen soft no BM, fluid balance negative, wt down, 1 L NC. Hbg 7.1- one unit RBC overnight. Cr trending down, good uop. 260 out of chest tubes overnight. Plan:  Reduce diuresis. Keep chest tubes. Hold beta blockers and keep pacing wires for junctional rhythm. IS/ambulate.     Disposition:  TBD

## 2024-12-23 NOTE — THERAPY
"Occupational Therapy   Initial Evaluation     Patient Name: Lionel Epperson  Age:  69 y.o., Sex:  male  Medical Record #: [id number]  Today's Date: 12/23/2024     Precautions  Precautions: Fall Risk, Cardiac Precautions (See Comments), Sternal Precautions (See Comments)    Assessment    Patient is 69 y.o. male s/p aortic valve replacement, ascending aortic aneurysm repair, and aortic root replacement. Other pertinent medical history includes severe aortic stenosis, HTN, encephalopathy, and DAVE. Pt seen for OT evaluation. Pt required CGA for functional mobility, standing handwashing/oral care, and min A for toilet hygiene. Pt lives with his wife who works from home and reported she can assist as needed upon d/c. Pt and wife educated regarding the role of OT and precautions in relation to ADLs. Pt current functional performance limited by impaired cognition, impaired balance, and limited knowledge of precautions. Pt will benefit from skilled OT while admitted to acute care.     Plan    Occupational Therapy Initial Treatment Plan   Treatment Interventions: Self Care / Activities of Daily Living, Adaptive Equipment, Neuro Re-Education / Balance, Therapeutic Exercises, Therapeutic Activity  Treatment Frequency: 3 Times per Week  Duration: Until Therapy Goals Met    DC Equipment Recommendations: Unable to determine at this time  Discharge Recommendations: Recommend home health for continued occupational therapy services      Subjective    \"Do you rotate and work for other ?\"    Objective     12/23/24 0944   Prior Living Situation   Prior Services None   Housing / Facility 1 Story House   Steps Into Home 0   Steps In Home 0   Bathroom Set up Walk In Shower;Built-In Shower Chair   Equipment Owned Raised Toilet Seat Without Arms   Lives with - Patient's Self Care Capacity Spouse   Comments Pt's spouse present for eval and very supportive. Pt's spouse works from home and reported she can assist as needed.   Prior " "Level of ADL Function   Self Feeding Independent   Grooming / Hygiene Independent   Bathing Independent   Dressing Independent   Toileting Independent   Prior Level of IADL Function   Medication Management Independent   Laundry Independent   Kitchen Mobility Independent   Finances Independent   Home Management Independent   Shopping Independent   Prior Level Of Mobility Independent Without Device in Community;Independent Without Device in Home   Driving / Transportation Driving Independent   Occupation (Pre-Hospital Vocational) Retired Due To Age  (was an )   History of Falls   History of Falls No   Precautions   Precautions Fall Risk;Cardiac Precautions (See Comments);Sternal Precautions (See Comments)   Vitals   Vitals Comments remained WDL throughout   Pain   Pain Scales 0 to 10 Scale    Pain 0 - 10 Group   Therapist Pain Assessment Post Activity Pain Same as Prior to Activity;Nurse Notified  (Not rated, agreeable to eval)   Cognition    Cognition / Consciousness X   Level of Consciousness Alert   Ability To Follow Commands 2 Step   New Learning Impaired   Attention Impaired   Comments very pleasant and cooperative, receptive to education, occasionally asking odd questions \"Do you rotate to work with other ?\"   Passive ROM Upper Body   Passive ROM Upper Body WDL   Active ROM Upper Body   Active ROM Upper Body  WDL   Comments tested within precautions   Strength Upper Body   Upper Body Strength  WDL   Comments tested within precautions   Sensation Upper Body   Upper Extremity Sensation  WDL   Upper Body Muscle Tone   Upper Body Muscle Tone  WDL   Coordination Upper Body   Coordination WDL   Balance Assessment   Sitting Balance (Static) Fair   Sitting Balance (Dynamic) Fair   Standing Balance (Static) Fair   Standing Balance (Dynamic) Fair -   Weight Shift Sitting Fair   Weight Shift Standing Fair   Comments w/ FWW   Bed Mobility    Comments up to recliner pre/post, discussed log roll technique   ADL " Assessment   Grooming Contact Guard Assist;Standing  (washed hands, brushed teeth)   Toileting Minimal Assist  (BM on toilet, cues for techniques to maintain precautions)   Functional Mobility   Sit to Stand Contact Guard Assist   Bed, Chair, Wheelchair Transfer Contact Guard Assist   Toilet Transfers Contact Guard Assist   Transfer Method Stand Step   Mobility chair>bathroom>chair   Comments w/ FWW   ICU Target Mobility Level   ICU Mobility - Targeted Level Level 4   Visual Perception   Visual Perception  Not Tested   Activity Tolerance   Sitting in Chair up to chair pre/post   Sitting Edge of Bed NT   Standing >5 min   Comments limited by weakness   Patient / Family Goals   Patient / Family Goal #1 to go home   Short Term Goals   Short Term Goal # 1 Pt will perform LB dressing w/ supv and AE PRN   Short Term Goal # 2 Pt will perform ADL transfer w/ supv and no cues to maintain precautions   Short Term Goal # 3 Pt will perform standing g/h w/ supv   Short Term Goal # 4 Pt will perform toilet hygiene w/ supv while maintaining precautions   Education Group   Education Provided Role of Occupational Therapist;Activities of Daily Living   Role of Occupational Therapist Patient Response Patient;Acceptance;Explanation;Verbal Demonstration;Family   Cardiac Precautions Patient Response Patient;Acceptance;Explanation;Handout;Verbal Demonstration;Reinforcement Needed;Family   Sternal Precautions Patient Response Patient;Family;Acceptance;Explanation;Demonstration;Verbal Demonstration;Action Demonstration;Handout;Reinforcement Needed   ADL Patient Response Patient;Acceptance;Explanation;Demonstration;Verbal Demonstration;Action Demonstration;Family;Reinforcement Needed   Occupational Therapy Initial Treatment Plan    Treatment Interventions Self Care / Activities of Daily Living;Adaptive Equipment;Neuro Re-Education / Balance;Therapeutic Exercises;Therapeutic Activity   Treatment Frequency 3 Times per Week   Duration Until  Therapy Goals Met   Problem List   Problem List Decreased Active Daily Living Skills;Decreased Homemaking Skills;Decreased Functional Mobility;Decreased Activity Tolerance;Impaired Postural Control / Balance;Limited Knowledge of Post Op Precautions

## 2024-12-23 NOTE — PROGRESS NOTES
MONITOR SUMMARY:     Rhythm: Sinus Noe, NSR  Rate: 50s - 60s  Ectopy: NA  Measurements: .16/.07/.42

## 2024-12-23 NOTE — PROGRESS NOTES
Critical Care Progress Note    Date of admission  12/20/2024    Chief Complaint  69 y.o. male admitted 12/20/2024 with severe aortic stenosis s/p AVR    Hospital Course  Mr. Epperson is a 69 y.o. male with the past medical history significant for hypertension, hyperlipidemia, aortic stenosis due to bicuspid aortic valve, and ascending aortic enlargement who was sent to cardiothoracic, Dr. Mina, for consideration of valve replacement due to worsening dizziness with position changes.  The patient is active at baseline.  Cardiothoracic recommended aortic valve replacement with aortic aneurysm repair.  The patient was taken to the operating room on 12/20/2024 where he underwent elective aortic valve replacement with coronary artery reimplantation.  The surgery itself was uncomplicated; however, when the patient was coming off pump he was found to have a mild bleed that was repaired by Dr. Rizvi and received 1 unit of platelets due to oozing.  The patient also went into ventricular tachycardia coming off pump requiring 300mg of amiodarone and 100 mg of lidocaine.  He was then 100% paced when he was brought up to the ICU.  While getting settled in the ICU it was noted that his chest tubes had already put out close to 300 cc of bright red blood along with increasing Levophed requirements.  The patient was given boluses of IV fluids as well as 2 units of FFP and cryoprecipitate.  The critical care team was consulted to assist with postoperative management.     12/22 -received 2 additional units of blood, moderate output from chest tubes, confusion but agitation better weaned off Precedex DAVE worse but UO good, hemodynamics acceptable    Interval Problem Update  Reviewed last 24 hour events:    POD#3  A&O x 3-4, still confused  Off Precedex  Wife has noticed improvements in mentation over a couple of days including this morning  SB/SR 50-60s  SBp 110-130s  UO 1650, 3.5L/24 hours  Fluid balance -3.1 L  WOB low  O2 1 lmp  NC  O2 sats 95-98%  CXR film   cc last night  IS 1500  Tm 99.0  WBC 18.3, improved  Lactic acid down to 1.5 yesterday  , K3.7, HCO3 27, AG 10, Ca 8.0  BUN 40, creatinine 1.5-both improved  HGB 7.1,   Glucose 82, 131, 144, 148, 125  Enoxaparin and omeprazole PPx  ASA, rosuvastatin    D/c BB  Chest tubes per CVS  Transfuse one unit a.m.  Incentive spirometry  Mobilize  Reduce Lasix?  Pulled kidd      EKG JR 50-60s  Keep V wires  Avoid AV karen blocking agents  ERP      Neuro follow-up later in day, LOC improved and confusion seems to be resolving.  Will see if he sundown's again tonight?    Follow-up hemoglobin 9.7 after transfusion, continue to trend, no bleeding clinically    YESTERDAY   - POD#2   - Hb of 6.6 and received another 2 units pRBCs   - pt became delirious overnight requiring Haldol and then started on Precedex   - sedated this morning-->precedex 0.9   - stopped Precedex this morning   - ambulated to new room, anxious to walk more   - still confused, A/Ox3   - SB 50s   - SBP 90-100s   - cardiac diet   - UOP of 1450cc overnight, kidd   - BM pta   - chest tubes with 240cc overnight   - up to chair and walked   - PEP   - IS 1500cc   - SCDs   - PPI   - Hb 8.2   - WBCs 21   - platelets 136   - K 4.8   - Creat 2.42    Review of Systems  Review of Systems   Constitutional:  Positive for malaise/fatigue. Negative for chills and fever.   HENT:  Negative for congestion and sore throat.    Eyes:  Negative for discharge and redness.   Respiratory:  Negative for cough and shortness of breath.    Cardiovascular:  Positive for chest pain (Visional, improved). Negative for leg swelling.   Gastrointestinal:  Negative for abdominal pain, blood in stool, nausea and vomiting.   Genitourinary:  Negative for flank pain and hematuria.   Musculoskeletal:  Negative for back pain and neck pain.   Skin:  Negative for rash.   Neurological:  Positive for dizziness. Negative for sensory change, speech change and  focal weakness.   Endo/Heme/Allergies:  Does not bruise/bleed easily.   Psychiatric/Behavioral:  Negative for depression. The patient is not nervous/anxious.         Vital Signs for last 24 hours   Temp:  [36.7 °C (98 °F)-37.2 °C (99 °F)] 36.9 °C (98.4 °F)  Pulse:  [54-72] 62  Resp:  [13-28] 23  BP: ()/(45-68) 108/56  SpO2:  [90 %-99 %] 90 %    Hemodynamic parameters for last 24 hours       Respiratory Information for the last 24 hours       Physical Exam   Physical Exam  Vitals and nursing note reviewed.   Constitutional:       General: He is not in acute distress.     Appearance: He is ill-appearing (Looks better per RN/family). He is not toxic-appearing.   HENT:      Head: Normocephalic and atraumatic.      Right Ear: External ear normal.      Left Ear: External ear normal.      Nose: Nose normal. No rhinorrhea.      Mouth/Throat:      Mouth: Mucous membranes are moist.      Pharynx: Oropharynx is clear. No oropharyngeal exudate.   Eyes:      General: No scleral icterus.     Extraocular Movements: Extraocular movements intact.      Conjunctiva/sclera: Conjunctivae normal.      Pupils: Pupils are equal, round, and reactive to light.   Neck:      Comments: Right IJ TLC  Cardiovascular:      Rate and Rhythm: Normal rate and regular rhythm. No extrasystoles are present.     Pulses:           Dorsalis pedis pulses are 1+ on the right side and 1+ on the left side.      Heart sounds: Heart sounds are distant. No murmur heard.     No gallop.      Comments: Junctional rhythm on the monitor  Pulmonary:      Breath sounds: No wheezing.      Comments: chest tubes in place, no airleak, still a fair amount of watery output lets blood tinged  Chest:      Chest wall: No tenderness.      Comments: V wires in place  Abdominal:      General: Bowel sounds are normal. There is no distension.      Palpations: Abdomen is soft.      Tenderness: There is no abdominal tenderness. There is no guarding or rebound.   Genitourinary:      Comments: Zamora in place  Musculoskeletal:         General: Normal range of motion.      Cervical back: Normal range of motion and neck supple.      Right lower leg: No edema.      Left lower leg: No edema.   Lymphadenopathy:      Cervical: No cervical adenopathy.   Skin:     General: Skin is warm and dry.      Capillary Refill: Capillary refill takes less than 2 seconds.      Findings: No rash.   Neurological:      Mental Status: He is alert and oriented to person, place, and time.      Cranial Nerves: No cranial nerve deficit.      Sensory: No sensory deficit.      Motor: No weakness.   Psychiatric:         Mood and Affect: Mood normal.         Behavior: Behavior normal.         Thought Content: Thought content normal.         Medications  Current Facility-Administered Medications   Medication Dose Route Frequency Provider Last Rate Last Admin    furosemide (Lasix) injection 40 mg  40 mg Intravenous BID DIURETIC CATHLEEN BergerP.R.NThad        potassium chloride SA (Kdur) tablet 40 mEq  40 mEq Oral BID CATHLEEN BergerP.RThadNThad        haloperidol lactate (Haldol) injection 5 mg  5 mg Intravenous Q4HRS PRN CATHLEEN BergerPThadR.N.   5 mg at 12/22/24 0101    rosuvastatin (Crestor) tablet 10 mg  10 mg Oral Q EVENING KVNG SmithA.-C.   10 mg at 12/22/24 1704    Respiratory Therapy Consult   Nebulization Continuous RT CHRISSY SmithCThad        enoxaparin (Lovenox) inj 40 mg  40 mg Subcutaneous DAILY AT 1800 KVNG SmithAThad-CThad   40 mg at 12/22/24 1704    Nozin nasal  swab  1 Applicator Each Nostril BID CHRISSY SmithCThad   1 Applicator at 12/22/24 2011    calcium CHLORIDE 10 % 1,000 mg in  mL IVPB  1,000 mg Intravenous Once PRN FROILAN Smith.-C.        aspirin EC tablet 81 mg  81 mg Oral DAILY LIZY Smith.A.-C.   81 mg at 12/23/24 0625    acetaminophen (Tylenol) tablet 1,000 mg  1,000 mg Oral Q6HRS DIONNA Smith-CThad   1,000 mg at 12/23/24 0624    Followed by    [START ON  12/30/2024] acetaminophen (Tylenol) tablet 1,000 mg  1,000 mg Oral Q6HRS PRN LIZY Smith.A.-C.        oxyCODONE immediate-release (Roxicodone) tablet 5 mg  5 mg Oral Q3HRS PRN LIZY Smith.A.-C.   5 mg at 12/22/24 2126    Or    oxyCODONE immediate release (Roxicodone) tablet 10 mg  10 mg Oral Q3HRS PRN LIZY Smith.A.-C.   10 mg at 12/21/24 0350    Or    fentaNYL (Sublimaze) injection 50 mcg  50 mcg Intravenous Q3HRS PRN LIZY Smith.A.-C.   50 mcg at 12/22/24 2233    ondansetron (Zofran) syringe/vial injection 8 mg  8 mg Intravenous Q6HRS PRN LIZY Smith.A.-C.   8 mg at 12/21/24 0632    Or    prochlorperazine (Compazine) injection 10 mg  10 mg Intravenous Q6HRS PRN LIZY Smith.A.-C.        acetaminophen (Tylenol) tablet 650 mg  650 mg Oral Q4HRS PRN LIZY Smith.A.-C.        Or    acetaminophen (Tylenol) suppository 650 mg  650 mg Rectal Q4HRS PRN KVNG SmithA.-C.        senna-docusate (Pericolace Or Senokot S) 8.6-50 MG per tablet 2 Tablet  2 Tablet Oral BID KVNG SmithA.-C.   2 Tablet at 12/23/24 0624    And    polyethylene glycol/lytes (Miralax) Packet 1 Packet  1 Packet Oral DAILY LIZY Smith.A.-C.   1 Packet at 12/23/24 0626    And    magnesium hydroxide (Milk Of Magnesia) suspension 30 mL  30 mL Oral DAILY KVNG SmithA.-C.   30 mL at 12/23/24 0626    And    bisacodyl (Dulcolax) suppository 10 mg  10 mg Rectal QDAY PRN LIZY Smith.A.-C.        omeprazole (PriLOSEC) capsule 20 mg  20 mg Oral DAILY KVNG SmithA.-C.   20 mg at 12/23/24 0626    mag hydrox-al hydrox-simeth (Maalox Plus Es Or Mylanta Ds) suspension 30 mL  30 mL Oral Q4HRS PRN Alejandro Nash P.A.-C.           Fluids    Intake/Output Summary (Last 24 hours) at 12/23/2024 0845  Last data filed at 12/23/2024 0803  Gross per 24 hour   Intake 680 ml   Output 5885 ml   Net -5205 ml       Laboratory  Recent Labs     12/20/24  1700 12/20/24  1842 12/20/24  2301 12/21/24  1634 12/22/24  0521   ISTATAPH 7.335*  7.369 7.412 7.389 7.432   ISTATAPCO2 31.0* 28.9* 27.8* 30.3* 34.4   ISTATAPO2 81* 90 86 53* 55*   ISTATATCO2 17* 18* 19* 19* 24*   [id number] 95 97 97 87* 89*   ISTATARTHCO3 16.5* 16.7* 17.7* 18.3* 23.0   ISTATARTBE -9* -8* -6* -6* -1   ISTATTEMP 36.3 C 36.9 C 37.8 C 97.8 F 37.7 C   ISTATFIO2 30 30 30  --   --    ISTATSPEC Arterial Arterial Arterial Arterial Arterial   ISTATAPHTC 7.345* 7.371 7.401 7.395 7.422   [id number] 77 89* 91* 51* 58*         Recent Labs     12/20/24  1300 12/20/24  1735 12/21/24  1137 12/22/24  0016 12/23/24  0114   SODIUM  --    < > 135 135 136   POTASSIUM 4.5   < > 5.3 4.8 3.7   CHLORIDE  --    < > 104 103 99   CO2  --    < > 17* 20 27   BUN  --    < > 36* 46* 40*   CREATININE  --    < > 2.25* 2.42* 1.50*   MAGNESIUM 4.3*  --   --   --   --    PHOSPHORUS  --   --  6.7*  --   --    CALCIUM  --    < > 8.4* 8.1* 8.0*    < > = values in this interval not displayed.     Recent Labs     12/21/24  1137 12/22/24  0016 12/23/24  0114   GLUCOSE 171* 153* 115*     Recent Labs     12/21/24  0415 12/22/24  0016 12/23/24  0114   WBC 17.9* 21.6* 18.3*     Recent Labs     12/20/24  1300 12/20/24  1340 12/20/24  1735 12/21/24  0415 12/21/24  1137 12/22/24  0016 12/22/24  0526 12/23/24  0114   RBC  --   --   --  2.83*  --  2.20*  --  2.45*   HEMOGLOBIN 11.9* 8.3*   < > 8.4*   < > 6.6* 8.2* 7.1*   HEMATOCRIT 35.0* 24.7*   < > 24.4*   < > 18.9* 23.1* 21.1*   PLATELETCT 184  --   --  162*  --  136*  --  141*   PROTHROMBTM 20.2* 19.8*  --   --   --   --   --   --    APTT 32.1  --   --   --   --   --   --   --    INR 1.72* 1.67*  --   --   --   --   --   --     < > = values in this interval not displayed.       Imaging  Reviewed    Assessment/Plan  * Severe aortic stenosis- (present on admission)  Assessment & Plan  Due to bicuspid aortic valve  S/p AVR with Dr. Mina on 12/20  Hemodynamics per CVS  V wires per CVS, junctional rhythm 12/23-likely prudent to keep V wires and avoid AV karen blocking  agents  Optimize electrolytes  SBP goals > 90 and < 120  S/p vasopressors  Monitor chest tube output for bleeding  Serial Hb and BMP    Acute blood loss anemia- (present on admission)  Assessment & Plan  Due to OR and AVR -no bleeding clinically now including from chest tubes  No history of bleeding issues or anemia of any kind  S/p 4 units FFP, 2 platelets, 2 cryo, CaCl 1g, and factor VII on 12/20  S/p 2 units pRBCs on 12/20,   S/p 2 units of pRBCs on 12/22  S/p 1 unit of  pRBCs on 12/23  Serial Hb/Hct  Transfuse for Hb< 8  Continue to monitor chest tube output     DAVE (acute kidney injury) ([facility])  Assessment & Plan  ?due to ischemic ATN after acute blood loss and hypotension  Lasix ongoing  Avoid nephrotoxins, renally dose medications as clinically appropriate  Monitor UOP Zamora catheter/serial BMP    Urine output good and creatinine/BUN definitely improving  Likely can reduce Lasix dose  Demise electrolytes    Acute encephalopathy  Assessment & Plan  Suspect metabolic with DAVE and acute blood loss anemia  Precedex for RASS goals -1 to +1, weaned off 12/22  Resume titrating Precedex for RASS goals as needed  Limit sedatives and ICU stimuli  Maintain activity levels during the day and limit stimulation at night  LOC better, still a bit confused, not quite back to baseline per wife    Encounter for weaning from ventilator ([facility])  Assessment & Plan  Intubated for AVR on 12/20  Extubated later in the evening  RT/O2 protocols  Encouraged IS/early mobility   Room air trial 12/23    Essential hypertension- (present on admission)  Assessment & Plan  Holding BP medications today  Resume ACE inhibitor as clinically appropriate  Junctional rhythm by EKG a.m. 12/23-hold beta-blockers/AV karen agents for now    Thrombocytopenia ([facility])  Assessment & Plan  Platelet count now trending up-likely down secondary to the same reasons he is anemic  Juanjo 136 currently 141  With frequent transfusions ideally keep platelet count greater  than  K range  Serial CBC    Aneurysm of ascending aorta without rupture ([facility])- (present on admission)  Assessment & Plan  Only 4.4cm and not repaired  Would likely benefit from outpatient follow-up imaging      Pure hypercholesterolemia- (present on admission)  Assessment & Plan  Cont home rosuvastatin 10mg PO daily     Bicuspid aortic valve- (present on admission)  Assessment & Plan  S/p AVR on 12/20         VTE:  Lovenox  Ulcer: PPI  Lines: Central Line  Ongoing indication addressed, Arterial Line  D/C'd today, and Zamora Catheter  Ongoing indication addressed    I have performed a physical exam and reviewed and updated ROS and Plan today (12/23/2024). In review of yesterday's note (12/22/2024), there are no changes except as documented above.     Discussed patient condition and risk of morbidity and/or mortality with RN, RT, Pharmacy, Charge nurse / hot rounds, Patient, and CVS

## 2024-12-23 NOTE — DISCHARGE PLANNING
Discharge Appointments/outpatient referrals/ and HH set up:    Cardiac surgery follow up appointment made for 4-5 weeks out    Hospital discharge team/schedulers called for cardiology f/u appointment for MD or APRN within 3-4 weeks if possible.    Aortic surveillance program/vascular medicine referral not needed, orders not placed.    Anticoagulation referral/ coumadin clinic referral not needed and orders not placed.    INR draws are not indicated for aortic root replacement procedure.    Will await PT/OT notes and medical progression to determine further discharge needs.

## 2024-12-23 NOTE — CARE PLAN
The patient is Watcher - Medium risk of patient condition declining or worsening    Shift Goals  Clinical Goals: Wean O2 to room air, mobilize, patient safety, encourage IS useage  Patient Goals: sleep  Family Goals: Patient comfort    Progress made toward(s) clinical / shift goals:    Problem: Pain - Standard  Goal: Alleviation of pain or a reduction in pain to the patient’s comfort goal  Outcome: Progressing     Problem: Post op day 2 CABG/Heart Valve Replacement  Goal: Optimal care of the post op CABG/heart valve replacement post op day 2  Intervention: Daily weights in the morning  Note: Stand up weight 99.6kg  Intervention: Up in chair for all meals  Note: Completed by day nurse  Intervention: Ambulate 4 times daily, increasing the distance each time  Note: Patient ambulated full lab around the unit 5 times.  Intervention: Stand at sink and wash up with assistance.  Clean incisions twice daily with soap and water.  Note: Completed  Intervention: IS q 1 hour while awake and record best IS volume  Note: Best IS 1250ml  Intervention: Consider pacer wire removal by MD  Note: Wires capped  Intervention: Consider removal of kidd and chest tube if not already done  Note: Kidd still in place       Patient is not progressing towards the following goals:

## 2024-12-23 NOTE — PROGRESS NOTES
Received report from JEAN-CLAUDE Coronado. Assumed care of patient; lines and gtts verified. Plan of care discussed with patient and family. Patient is A&Ox4 and has been educated to call for assistance before getting up; he verbalizes understanding.    GABRIELE Talamantes, at bedside. Discussed with her that patient appears to be in junctional on GE monitor, orders received for an EKG to confirm. EKG ordered and showed junctional. Pacing wires to stay today. Orders received to remove kidd catheter but to keep chest tubes; orders verified and implemented.     Tele monitor and chair alarm in place to help prevent patient falls.     Dr. Conteh came to bedside to assess patient; he reviewed EKG.

## 2024-12-23 NOTE — ASSESSMENT & PLAN NOTE
Platelet count now trending up-likely down secondary to the same reasons he is anemic  Juanjo 136 currently 141  With frequent transfusions ideally keep platelet count greater than  K range  Serial CBC

## 2024-12-24 PROBLEM — I48.91 ATRIAL FIBRILLATION (HCC): Status: ACTIVE | Noted: 2024-12-24

## 2024-12-24 LAB
ANION GAP SERPL CALC-SCNC: 12 MMOL/L (ref 7–16)
BUN SERPL-MCNC: 33 MG/DL (ref 8–22)
CALCIUM SERPL-MCNC: 7.9 MG/DL (ref 8.5–10.5)
CHLORIDE SERPL-SCNC: 101 MMOL/L (ref 96–112)
CO2 SERPL-SCNC: 26 MMOL/L (ref 20–33)
CREAT SERPL-MCNC: 1.09 MG/DL (ref 0.5–1.4)
EKG IMPRESSION: NORMAL
ERYTHROCYTE [DISTWIDTH] IN BLOOD BY AUTOMATED COUNT: 43.9 FL (ref 35.9–50)
GFR SERPLBLD CREATININE-BSD FMLA CKD-EPI: 74 ML/MIN/1.73 M 2
GLUCOSE SERPL-MCNC: 102 MG/DL (ref 65–99)
HCT VFR BLD AUTO: 25.9 % (ref 42–52)
HGB BLD-MCNC: 9.1 G/DL (ref 14–18)
MAGNESIUM SERPL-MCNC: 2.4 MG/DL (ref 1.5–2.5)
MCH RBC QN AUTO: 30.4 PG (ref 27–33)
MCHC RBC AUTO-ENTMCNC: 35.1 G/DL (ref 32.3–36.5)
MCV RBC AUTO: 86.6 FL (ref 81.4–97.8)
PLATELET # BLD AUTO: 161 K/UL (ref 164–446)
PMV BLD AUTO: 11.8 FL (ref 9–12.9)
POTASSIUM SERPL-SCNC: 3.8 MMOL/L (ref 3.6–5.5)
RBC # BLD AUTO: 2.99 M/UL (ref 4.7–6.1)
SODIUM SERPL-SCNC: 139 MMOL/L (ref 135–145)
WBC # BLD AUTO: 15.2 K/UL (ref 4.8–10.8)

## 2024-12-24 PROCEDURE — 99024 POSTOP FOLLOW-UP VISIT: CPT | Performed by: THORACIC SURGERY (CARDIOTHORACIC VASCULAR SURGERY)

## 2024-12-24 PROCEDURE — 85027 COMPLETE CBC AUTOMATED: CPT

## 2024-12-24 PROCEDURE — 97535 SELF CARE MNGMENT TRAINING: CPT

## 2024-12-24 PROCEDURE — 93005 ELECTROCARDIOGRAM TRACING: CPT | Mod: TC | Performed by: THORACIC SURGERY (CARDIOTHORACIC VASCULAR SURGERY)

## 2024-12-24 PROCEDURE — 97163 PT EVAL HIGH COMPLEX 45 MIN: CPT

## 2024-12-24 PROCEDURE — 99291 CRITICAL CARE FIRST HOUR: CPT | Performed by: INTERNAL MEDICINE

## 2024-12-24 PROCEDURE — 80048 BASIC METABOLIC PNL TOTAL CA: CPT

## 2024-12-24 PROCEDURE — 700111 HCHG RX REV CODE 636 W/ 250 OVERRIDE (IP): Performed by: NURSE PRACTITIONER

## 2024-12-24 PROCEDURE — 700102 HCHG RX REV CODE 250 W/ 637 OVERRIDE(OP)

## 2024-12-24 PROCEDURE — A9270 NON-COVERED ITEM OR SERVICE: HCPCS | Performed by: NURSE PRACTITIONER

## 2024-12-24 PROCEDURE — 83735 ASSAY OF MAGNESIUM: CPT

## 2024-12-24 PROCEDURE — 700102 HCHG RX REV CODE 250 W/ 637 OVERRIDE(OP): Performed by: NURSE PRACTITIONER

## 2024-12-24 PROCEDURE — A9270 NON-COVERED ITEM OR SERVICE: HCPCS

## 2024-12-24 PROCEDURE — 770020 HCHG ROOM/CARE - TELE (206)

## 2024-12-24 PROCEDURE — 700111 HCHG RX REV CODE 636 W/ 250 OVERRIDE (IP)

## 2024-12-24 PROCEDURE — 93010 ELECTROCARDIOGRAM REPORT: CPT | Performed by: STUDENT IN AN ORGANIZED HEALTH CARE EDUCATION/TRAINING PROGRAM

## 2024-12-24 RX ORDER — AMIODARONE HYDROCHLORIDE 200 MG/1
400 TABLET ORAL 2 TIMES DAILY
Status: DISCONTINUED | OUTPATIENT
Start: 2024-12-24 | End: 2024-12-24

## 2024-12-24 RX ORDER — AMIODARONE HYDROCHLORIDE 200 MG/1
400 TABLET ORAL 2 TIMES DAILY
Status: DISCONTINUED | OUTPATIENT
Start: 2024-12-24 | End: 2024-12-25 | Stop reason: HOSPADM

## 2024-12-24 RX ADMIN — ACETAMINOPHEN 1000 MG: 500 TABLET ORAL at 12:29

## 2024-12-24 RX ADMIN — AMIODARONE HYDROCHLORIDE 1 MG/MIN: 1.8 INJECTION, SOLUTION INTRAVENOUS at 01:43

## 2024-12-24 RX ADMIN — Medication 1 APPLICATOR: at 07:33

## 2024-12-24 RX ADMIN — POTASSIUM CHLORIDE 40 MEQ: 20 TABLET, EXTENDED RELEASE ORAL at 05:22

## 2024-12-24 RX ADMIN — MAGNESIUM HYDROXIDE 30 ML: 1200 LIQUID ORAL at 05:21

## 2024-12-24 RX ADMIN — SENNOSIDES AND DOCUSATE SODIUM 2 TABLET: 50; 8.6 TABLET ORAL at 05:22

## 2024-12-24 RX ADMIN — OMEPRAZOLE 20 MG: 20 CAPSULE, DELAYED RELEASE ORAL at 05:22

## 2024-12-24 RX ADMIN — FUROSEMIDE 40 MG: 10 INJECTION INTRAMUSCULAR; INTRAVENOUS at 05:21

## 2024-12-24 RX ADMIN — AMIODARONE HYDROCHLORIDE 400 MG: 200 TABLET ORAL at 22:40

## 2024-12-24 RX ADMIN — FUROSEMIDE 40 MG: 10 INJECTION INTRAMUSCULAR; INTRAVENOUS at 17:32

## 2024-12-24 RX ADMIN — AMIODARONE HYDROCHLORIDE 0.5 MG/MIN: 1.8 INJECTION, SOLUTION INTRAVENOUS at 07:28

## 2024-12-24 RX ADMIN — Medication 1 APPLICATOR: at 22:02

## 2024-12-24 RX ADMIN — ENOXAPARIN SODIUM 40 MG: 100 INJECTION SUBCUTANEOUS at 17:32

## 2024-12-24 RX ADMIN — ROSUVASTATIN CALCIUM 10 MG: 10 TABLET, FILM COATED ORAL at 17:32

## 2024-12-24 RX ADMIN — ASPIRIN 81 MG: 81 TABLET, COATED ORAL at 05:22

## 2024-12-24 RX ADMIN — AMIODARONE HYDROCHLORIDE 150 MG: 1.5 INJECTION, SOLUTION INTRAVENOUS at 01:26

## 2024-12-24 RX ADMIN — POLYETHYLENE GLYCOL 3350 1 PACKET: 17 POWDER, FOR SOLUTION ORAL at 05:22

## 2024-12-24 RX ADMIN — ACETAMINOPHEN 1000 MG: 500 TABLET ORAL at 17:31

## 2024-12-24 RX ADMIN — AMIODARONE HYDROCHLORIDE 400 MG: 200 TABLET ORAL at 12:30

## 2024-12-24 RX ADMIN — POTASSIUM CHLORIDE 40 MEQ: 20 TABLET, EXTENDED RELEASE ORAL at 18:00

## 2024-12-24 RX ADMIN — ACETAMINOPHEN 1000 MG: 500 TABLET ORAL at 05:25

## 2024-12-24 ASSESSMENT — ENCOUNTER SYMPTOMS
COUGH: 0
SORE THROAT: 0
BLOOD IN STOOL: 0
CHILLS: 0
SPEECH CHANGE: 0
EYE REDNESS: 0
NERVOUS/ANXIOUS: 0
EYE DISCHARGE: 0
NECK PAIN: 0
DIZZINESS: 0
VOMITING: 0
SHORTNESS OF BREATH: 0
BACK PAIN: 0
DEPRESSION: 0
FOCAL WEAKNESS: 0
NAUSEA: 0
FEVER: 0
SENSORY CHANGE: 0
BRUISES/BLEEDS EASILY: 0
ABDOMINAL PAIN: 0
FLANK PAIN: 0

## 2024-12-24 ASSESSMENT — GAIT ASSESSMENTS
DISTANCE (FEET): 600
GAIT LEVEL OF ASSIST: SUPERVISED
ASSISTIVE DEVICE: FRONT WHEEL WALKER

## 2024-12-24 ASSESSMENT — PAIN DESCRIPTION - PAIN TYPE
TYPE: ACUTE PAIN;SURGICAL PAIN
TYPE: SURGICAL PAIN

## 2024-12-24 ASSESSMENT — COGNITIVE AND FUNCTIONAL STATUS - GENERAL
TURNING FROM BACK TO SIDE WHILE IN FLAT BAD: A LITTLE
SUGGESTED CMS G CODE MODIFIER MOBILITY: CJ
MOVING FROM LYING ON BACK TO SITTING ON SIDE OF FLAT BED: A LITTLE
MOBILITY SCORE: 22

## 2024-12-24 ASSESSMENT — FIBROSIS 4 INDEX: FIB4 SCORE: 2.65

## 2024-12-24 NOTE — ASSESSMENT & PLAN NOTE
Post AVR and junctional rhythm with A-fib RVR last night  IV amiodarone initiated by [facility] overnight  Optimize electrolytes  Keeping pacer wires  May need cardiology consultation  Watching rhythm and rate, caution with amiodarone, would like to discontinue possible  Patient relatively asymptomatic  Reviewed with wife [facility]    POD #4 hopefully rhythm issues will resolve in the next 24 hours and will not need intervention

## 2024-12-24 NOTE — PROGRESS NOTES
0046 notified Alejandro Nash that the patient converted into afib and an EKG was ordered. Patient asymptomatic. Alejandro Nash gave orders to check potassium and magnesium and start the amio protocol with bolus, gtt, and PO.

## 2024-12-24 NOTE — PROGRESS NOTES
Cardiovascular Surgery Progress Note    Name: Lionel Epperson  MRN: [id number]  : 1955  Admit Date: 2024  5:09 AM  4 Days Post-Op     Procedure:  Procedure(s) and Anesthesia Type:     * AORTIC VALVE REPLACEMENT, ASCENDING AORTIC ANEURYSM REPAIR, AORTIC ROOT REPLACEMENT - General     * REPAIR, ANEURYSM, AORTA, ASCENDING - General     * ECHOCARDIOGRAM, TRANSESOPHAGEAL, INTRAOPERATIVE - General    Vitals:  Vitals:    24 0700 24 0800 24 0900 24 1000   BP: 119/64 94/51 134/64 136/63   Pulse: 66 (!) 59 62 60   Resp: 20 19 (!) 22    Temp:   36.7 °C (98 °F)    TempSrc:   Temporal    SpO2: 91% 93% 97% 94%   Weight:       Height:          Temp (24hrs), Av.7 °C (98 °F), Min:36.3 °C (97.3 °F), Max:37.3 °C (99.1 °F)      Respiratory:    Respiration: (!) 22, Pulse Oximetry: 94 %       Fluids:    Intake/Output Summary (Last 24 hours) at 2024 1108  Last data filed at 2024 0850  Gross per 24 hour   Intake 285.83 ml   Output 2240 ml   Net -1954.17 ml     Admit weight: Weight: 89.2 kg (196 lb 10.4 oz)  Current weight: Weight: 89.4 kg (197 lb 1.5 oz) (24 05)    Labs:  Recent Labs     24  0016 24  0526 24  0114 24  0924  0117   WBC 21.6*  --  18.3*  --  15.2*   RBC 2.20*  --  2.45*  --  2.99*   HEMOGLOBIN 6.6*   < > 7.1* 9.7* 9.1*   HEMATOCRIT 18.9*   < > 21.1* 28.6* 25.9*   MCV 85.9  --  86.1  --  86.6   MCH 30.0  --  29.0  --  30.4   MCHC 34.9  --  33.6  --  35.1   RDW 45.5  --  45.7  --  43.9   PLATELETCT 136*  --  141*  --  161*   MPV 12.3  --  12.1  --  11.8    < > = values in this interval not displayed.     Recent Labs     24  0016 24  0114 24  0117   SODIUM 135 136 139   POTASSIUM 4.8 3.7 3.8   CHLORIDE 103 99 101   CO2 20 27 26   GLUCOSE 153* 115* 102*   BUN 46* 40* 33*   CREATININE 2.42* 1.50* 1.09   CALCIUM 8.1* 8.0* 7.9*             HgbA1c:  5.4    Diabetic Educator Consult:  N/A    Medications:  Scheduled  Medications   Medication Dose Frequency    amiodarone  400 mg BID    furosemide  40 mg BID DIURETIC    potassium chloride SA  40 mEq BID    rosuvastatin  10 mg Q EVENING    enoxaparin (LOVENOX) injection  40 mg DAILY AT 1800    Nozin nasal  swab  1 Applicator BID    aspirin  81 mg DAILY    acetaminophen  1,000 mg Q6HRS    senna-docusate  2 Tablet BID    And    polyethylene glycol/lytes  1 Packet DAILY    And    magnesium hydroxide  30 mL DAILY    omeprazole  20 mg DAILY        Exam:   Physical Exam  Vitals and nursing note reviewed.   Constitutional:       General: He is not in acute distress.     Appearance: Normal appearance.   HENT:      Head: Normocephalic.      Right Ear: External ear normal.      Left Ear: External ear normal.      Nose: Nose normal. No congestion.      Mouth/Throat:      Mouth: Mucous membranes are moist.      Pharynx: Oropharynx is clear.   Eyes:      Extraocular Movements: Extraocular movements intact.      Pupils: Pupils are equal, round, and reactive to light.   Cardiovascular:      Rate and Rhythm: Normal rate and regular rhythm.      Pulses: Normal pulses.      Heart sounds: Normal heart sounds.   Pulmonary:      Effort: Pulmonary effort is normal.      Breath sounds: Decreased breath sounds present.   Abdominal:      General: Bowel sounds are decreased. There is no distension.      Palpations: Abdomen is soft.   Musculoskeletal:      Cervical back: Normal range of motion and neck supple. No tenderness.      Right lower leg: Edema present.      Left lower leg: Edema present.   Skin:     General: Skin is warm and dry.      Comments: Midline surgical incision   Neurological:      Mental Status: He is lethargic and confused.      Comments: Recently off sedation at time of exam         Cardiac Medications:    ASA - Yes    Plavix - No; contraindicated because of Other Aortic root    Post-operative Beta Blockers - No; contraindicated because of Sinus bradycardia/heart block and Other  junctional rhythm    Ace/ARB- No; contraindicated because of Normal EF    ARNI-  No; contraindicated because of Normal EF    Statin - No; contraindicated because of No CAD    Aldactone- No; contraindicated because of Normal EF    SGLT2i-  No; contraindicated because of Normal EF    Ejection Fraction:  60%    Telemetry:   12/21 SB  12/22 SB/SR  12/23 junctional  12/24 a fib to junctional to SR    Assessment/Plan:  POD 1  HDS, SB, neuro intact, wounds intact, abdomen soft, fluid balance positive, wt up, 2 L NC. H/H 8.4/24.4, K 5.6, 1.57. Plan:  Diurese. Monitor renal function and h/h. Keep chest tubes, pacing wires, and kidd. IS/ambulate.     POD 2  HDS, SB/SR, confused and agitated- dex and haldol overnight, wounds intact, abdomen soft, fluid balance positive, wt up,  4 L NC. H/H 6.6/18.9 -received 2 units RBCs overnight. Cr 2.42, adequate UOP. Plan: Wean dex, mobilize, reorient.  Continue aggressive diuresis, keep kidd for strict I/O. Keep chest tubes and pacing wires. IS/ambulate.     POD 3  HDS, junctional 60s, neuro intact- agitation improved- tele sitter in place, wounds intact, abdomen soft no BM, fluid balance negative, wt down, 1 L NC. Hbg 7.1- one unit RBC overnight. Cr trending down, good uop. 260 out of chest tubes overnight. Plan:  Reduce diuresis. Keep chest tubes. Hold beta blockers and keep pacing wires for junctional rhythm. IS/ambulate.     POD 4  HDS, a fib last night, junctional this AM, now SB/SR, neuro intact, wounds intact, abdomen soft +BM, fluid balance negative, wt down,  room air. 100 out of chest tubes. Plan:  Stop amio gtt, continue PO. Keep pacing wires one more day. Remove chest tubes. IS/ambulate. Transfer to tele. Plan home tomorrow.     Disposition:  [facility]

## 2024-12-24 NOTE — PROGRESS NOTES
Critical Care Progress Note    Date of admission  12/20/2024    Chief Complaint  69 y.o. male admitted 12/20/2024 with severe aortic stenosis s/p AVR    Hospital Course  Mr. Epperson is a 69 y.o. male with the past medical history significant for hypertension, hyperlipidemia, aortic stenosis due to bicuspid aortic valve, and ascending aortic enlargement who was sent to cardiothoracic, Dr. Mina, for consideration of valve replacement due to worsening dizziness with position changes.  The patient is active at baseline.  Cardiothoracic recommended aortic valve replacement with aortic aneurysm repair.  The patient was taken to the operating room on 12/20/2024 where he underwent elective aortic valve replacement with coronary artery reimplantation.  The surgery itself was uncomplicated; however, when the patient was coming off pump he was found to have a mild bleed that was repaired by Dr. Rizvi and received 1 unit of platelets due to oozing.  The patient also went into ventricular tachycardia coming off pump requiring 300mg of amiodarone and 100 mg of lidocaine.  He was then 100% paced when he was brought up to the ICU.  While getting settled in the ICU it was noted that his chest tubes had already put out close to 300 cc of bright red blood along with increasing Levophed requirements.  The patient was given boluses of IV fluids as well as 2 units of FFP and cryoprecipitate.  The critical care team was consulted to assist with postoperative management.     12/22 -received 2 additional units of blood, moderate output from chest tubes, confusion but agitation better weaned off Precedex DAVE worse but UO good, hemodynamics acceptable    12/23 -received 1 unit of PRBCs, still with a fair amount of chest tube output, confusion better weaned off Precedex, DAVE better    Interval Problem Update  Reviewed last 24 hour events:    AF RVR overnight and started on amiodarone currently at 0.5  Current rhythm junctional with  normal blood pressures    A&O x4  Feels better with less pain  Wife still thinks his cognition/mentation is just a little off but better than yesterday  JR 60s  At around infusion ongoing  -140s   cc / 12 hours  Fluid balance -3.3 mL / 24 hours, dosed with Lasix every morning  ARB -3.6 5L  WOB low  O2 sats 91-96%  O2 weaned from 1 L to room air  I-S 2000 cc  No CXR today  TMax 99.1, WBC 15.2  Electrolytes normal, BUN improved to 33, creatinine improved to 1.09  ASA, rosuvastatin  Lovenox PPx  Furosemide 40 mg IV daily    Possible need to consult cardiology re: arrhythmia, keep pacer wires, low threshold to stop amiodarone if it suppresses rhythm    Case reviewed with [facility]  Rhythm now in sinus  Amiodarone discontinued     YESTERDAY  POD#3  A&O x 3-4, still confused  Off Precedex  Wife has noticed improvements in mentation over a couple of days including this morning  SB/SR 50-60s  SBp 110-130s  UO 1650, 3.5L/24 hours  Fluid balance -3.1 L  WOB low  O2 1 lmp NC  O2 sats 95-98%  CXR film   cc last night  IS 1500  Tm 99.0  WBC 18.3, improved  Lactic acid down to 1.5 yesterday  , K3.7, HCO3 27, AG 10, Ca 8.0  BUN 40, creatinine 1.5-both improved  HGB 7.1,   Glucose 82, 131, 144, 148, 125  Enoxaparin and omeprazole PPx  ASA, rosuvastatin    D/c BB  Chest tubes per [facility]  Transfuse one unit a.m.  Incentive spirometry  Mobilize  Reduce Lasix?  Pulled kidd      EKG JR 50-60s  Keep V wires  Avoid AV karen blocking agents  ERP      Neuro follow-up later in day, LOC improved and confusion seems to be resolving.  Will see if he sundown's again tonight?    Follow-up hemoglobin 9.7 after transfusion, continue to trend, no bleeding clinically    Review of Systems  Review of Systems   Constitutional:  Negative for chills, fever and malaise/fatigue.   HENT:  Negative for congestion and sore throat.    Eyes:  Negative for discharge and redness.   Respiratory:  Negative for cough and shortness of breath.     Cardiovascular:  Positive for chest pain (Improved again). Negative for leg swelling.   Gastrointestinal:  Negative for abdominal pain, blood in stool, nausea and vomiting.   Genitourinary:  Negative for flank pain and hematuria.   Musculoskeletal:  Negative for back pain and neck pain.   Skin:  Negative for rash.   Neurological:  Negative for dizziness, sensory change, speech change and focal weakness.   Endo/Heme/Allergies:  Does not bruise/bleed easily.   Psychiatric/Behavioral:  Negative for depression. The patient is not nervous/anxious.         Vital Signs for last 24 hours   Temp:  [36.3 °C (97.3 °F)-37.3 °C (99.1 °F)] 36.7 °C (98 °F)  Pulse:  [] 60  Resp:  [17-46] 22  BP: ()/(51-77) 136/63  SpO2:  [88 %-99 %] 94 %    Hemodynamic parameters for last 24 hours       Respiratory Information for the last 24 hours       Physical Exam   Physical Exam  Vitals and nursing note reviewed.   Constitutional:       General: He is not in acute distress.     Appearance: He is not ill-appearing (Looks better and family agrees) or toxic-appearing.   HENT:      Head: Normocephalic and atraumatic.      Right Ear: External ear normal.      Left Ear: External ear normal.      Nose: Nose normal. No rhinorrhea.      Mouth/Throat:      Mouth: Mucous membranes are moist.      Pharynx: Oropharynx is clear. No oropharyngeal exudate.   Eyes:      General: No scleral icterus.     Extraocular Movements: Extraocular movements intact.      Conjunctiva/sclera: Conjunctivae normal.      Pupils: Pupils are equal, round, and reactive to light.   Neck:      Comments: Right IJ TLC  Cardiovascular:      Rate and Rhythm: Normal rate and regular rhythm. No extrasystoles are present.     Pulses:           Dorsalis pedis pulses are 1+ on the right side and 1+ on the left side.      Heart sounds: Heart sounds are distant. No murmur heard.     No gallop.      Comments: Junctional rhythm on the monitor  Pulmonary:      Breath sounds: No  wheezing.      Comments: chest tubes in place, no airleak, still a fair amount of watery output lets blood tinged  Chest:      Chest wall: No tenderness.      Comments: V wires in place, sternotomy incision  Abdominal:      General: Bowel sounds are normal. There is no distension.      Palpations: Abdomen is soft.      Tenderness: There is no abdominal tenderness. There is no guarding or rebound.   Genitourinary:     Comments: Zamora removed  Musculoskeletal:         General: Normal range of motion.      Cervical back: Normal range of motion and neck supple.      Right lower leg: No edema.      Left lower leg: No edema.   Lymphadenopathy:      Cervical: No cervical adenopathy.   Skin:     General: Skin is warm and dry.      Capillary Refill: Capillary refill takes less than 2 seconds.      Coloration: Skin is not cyanotic or mottled.      Findings: No rash.      Nails: There is no clubbing.   Neurological:      Mental Status: He is alert and oriented to person, place, and time.      GCS: GCS eye subscore is 4. GCS verbal subscore is 5. GCS motor subscore is 6.      Cranial Nerves: No cranial nerve deficit.      Sensory: No sensory deficit.      Motor: No weakness.      Comments: Confusion at times but improved   Psychiatric:         Mood and Affect: Mood normal.         Speech: Speech normal.         Behavior: Behavior normal.         Thought Content: Thought content normal.         Medications  Current Facility-Administered Medications   Medication Dose Route Frequency Provider Last Rate Last Admin    amiodarone (Nexterone) 360 mg/200 mL infusion  0.5 mg/min Intravenous Continuous KVNG SmithA.-C.   Stopped at 12/24/24 0823    amiodarone (Cordarone) tablet 400 mg  400 mg Oral BID KVNG SmithA.-C.        furosemide (Lasix) injection 40 mg  40 mg Intravenous BID DIURETIC CATHLEEN BergerP.R.N.   40 mg at 12/24/24 0521    potassium chloride SA (Kdur) tablet 40 mEq  40 mEq Oral BID Yolande Nash  A.P.R.N.   40 mEq at 12/24/24 0522    haloperidol lactate (Haldol) injection 5 mg  5 mg Intravenous Q4HRS PRN YULISA Berger.R.N.   5 mg at 12/22/24 0101    rosuvastatin (Crestor) tablet 10 mg  10 mg Oral Q EVENING LIZY Smith.A.-C.   10 mg at 12/23/24 1757    Respiratory Therapy Consult   Nebulization Continuous RT DOINNA Smith-CThad        enoxaparin (Lovenox) inj 40 mg  40 mg Subcutaneous DAILY AT 1800 KVNG SmithA.-C.   40 mg at 12/23/24 1758    Nozin nasal  swab  1 Applicator Each Nostril BID Alejandro Nash P.A.-C.   1 Applicator at 12/24/24 0733    calcium CHLORIDE 10 % 1,000 mg in  mL IVPB  1,000 mg Intravenous Once PRN DIONNA Smith-CThad        aspirin EC tablet 81 mg  81 mg Oral DAILY KVNG SmithAThad-C.   81 mg at 12/24/24 0522    acetaminophen (Tylenol) tablet 1,000 mg  1,000 mg Oral Q6HRS KVNG SmithA.-C.   1,000 mg at 12/24/24 0525    Followed by    [START ON 12/30/2024] acetaminophen (Tylenol) tablet 1,000 mg  1,000 mg Oral Q6HRS PRN KVNG SmithA.-C.        oxyCODONE immediate-release (Roxicodone) tablet 5 mg  5 mg Oral Q3HRS PRN KVNG SmithA.-C.   5 mg at 12/22/24 2126    Or    oxyCODONE immediate release (Roxicodone) tablet 10 mg  10 mg Oral Q3HRS PRN KVNG SmithAThad-C.   10 mg at 12/21/24 0350    Or    fentaNYL (Sublimaze) injection 50 mcg  50 mcg Intravenous Q3HRS PRN LIZY Smith.A.-C.   50 mcg at 12/22/24 2233    ondansetron (Zofran) syringe/vial injection 8 mg  8 mg Intravenous Q6HRS PRN LIZY Smith.A.-C.   8 mg at 12/21/24 0632    Or    prochlorperazine (Compazine) injection 10 mg  10 mg Intravenous Q6HRS PRN KVNG SmithA.-C.        acetaminophen (Tylenol) tablet 650 mg  650 mg Oral Q4HRS PRN KVNG SmithA.-C.        Or    acetaminophen (Tylenol) suppository 650 mg  650 mg Rectal Q4HRS PRN KVNG SmithA.-C.        senna-docusate (Pericolace Or Senokot S) 8.6-50 MG per tablet 2 Tablet  2 Tablet Oral BID Alejandro Nash,  P.A.-C.   2 Tablet at 12/24/24 0522    And    polyethylene glycol/lytes (Miralax) Packet 1 Packet  1 Packet Oral DAILY Alejandro Nash P.A.-C.   1 Packet at 12/24/24 0522    And    magnesium hydroxide (Milk Of Magnesia) suspension 30 mL  30 mL Oral DAILY DIONNA Smith-CThad   30 mL at 12/24/24 0521    And    bisacodyl (Dulcolax) suppository 10 mg  10 mg Rectal QDAY PRN Alejandro Nash P.A.-C.        omeprazole (PriLOSEC) capsule 20 mg  20 mg Oral DAILY DIONNA Smith-FLORENCIA   20 mg at 12/24/24 0522    mag hydrox-al hydrox-simeth (Maalox Plus Es Or Mylanta Ds) suspension 30 mL  30 mL Oral Q4HRS PRN Alejandro Nash P.A.-C.           Fluids    Intake/Output Summary (Last 24 hours) at 12/24/2024 1131  Last data filed at 12/24/2024 0850  Gross per 24 hour   Intake 285.83 ml   Output 2240 ml   Net -1954.17 ml       Laboratory  Recent Labs     12/21/24  1634 12/22/24  0521   ISTATAPH 7.389 7.432   ISTATAPCO2 30.3* 34.4   ISTATAPO2 53* 55*   ISTATATCO2 19* 24*   UTKGKGV1XHJ 87* 89*   ISTATARTHCO3 18.3* 23.0   ISTATARTBE -6* -1   ISTATTEMP 97.8 F 37.7 C   ISTATSPEC Arterial Arterial   ISTATAPHTC 7.395 7.422   UPCYQXYL1DC 51* 58*         Recent Labs     12/21/24  1137 12/22/24  0016 12/23/24  0114 12/24/24  0117   SODIUM 135 135 136 139   POTASSIUM 5.3 4.8 3.7 3.8   CHLORIDE 104 103 99 101   CO2 17* 20 27 26   BUN 36* 46* 40* 33*   CREATININE 2.25* 2.42* 1.50* 1.09   MAGNESIUM  --   --   --  2.4   PHOSPHORUS 6.7*  --   --   --    CALCIUM 8.4* 8.1* 8.0* 7.9*     Recent Labs     12/22/24  0016 12/23/24  0114 12/24/24  0117   GLUCOSE 153* 115* 102*     Recent Labs     12/22/24  0016 12/23/24  0114 12/24/24  0117   WBC 21.6* 18.3* 15.2*     Recent Labs     12/22/24  0016 12/22/24  0526 12/23/24  0114 12/23/24  0900 12/24/24  0117   RBC 2.20*  --  2.45*  --  2.99*   HEMOGLOBIN 6.6*   < > 7.1* 9.7* 9.1*   HEMATOCRIT 18.9*   < > 21.1* 28.6* 25.9*   PLATELETCT 136*  --  141*  --  161*    < > = values in this interval not displayed.        Imaging  Reviewed    Assessment/Plan  * Severe aortic stenosis- (present on admission)  Assessment & Plan  Due to bicuspid aortic valve  S/p AVR with Dr. Mina on 12/20  Hemodynamics per [facility]  V wires per [facility], junctional rhythm 12/23-likely prudent to keep V wires and avoid AV karen blocking agents  Optimize electrolytes  SBP goals > 90 and < 120  S/p vasopressors  Monitor chest tube output for bleeding  Serial Hb and BMP  Needed by junctional rhythm and A-fib RVR    Atrial fibrillation (HCC)  Assessment & Plan  Post AVR and junctional rhythm with A-fib RVR last night  IV amiodarone initiated by [facility] overnight  Optimize electrolytes  Keeping pacer wires  May need cardiology consultation  Watching rhythm and rate, caution with amiodarone, would like to discontinue possible  Patient relatively asymptomatic  Reviewed with wife [facility]    POD #4 hopefully rhythm issues will resolve in the next 24 hours and will not need intervention    Acute blood loss anemia- (present on admission)  Assessment & Plan  Due to OR and AVR -no bleeding clinically now including from chest tubes  No history of bleeding issues or anemia of any kind  S/p 4 units FFP, 2 platelets, 2 cryo, CaCl 1g, and factor VII on 12/20  S/p 2 units pRBCs on 12/20,   S/p 2 units of pRBCs on 12/22  S/p 1 unit of  pRBCs on 12/23  Serial Hb/Hct  Transfuse for Hb< 8  Continue to monitor chest tube output   Output improved    DAVE (acute kidney injury) (HCC)  Assessment & Plan  ?due to ischemic ATN after acute blood loss and hypotension  Lasix ongoing  Avoid nephrotoxins, renally dose medications as clinically appropriate  Monitor UOP Zamora catheter/serial BMP    Output remains good  Renal function improved again  May be able to DC Lasix    Acute encephalopathy  Assessment & Plan  Suspect metabolic with ADVE and acute blood loss anemia  Precedex for RASS goals -1 to +1, weaned off 12/22  Resume titrating Precedex for RASS goals as needed  Limit sedatives and ICU  stimuli  Maintain activity levels during the day and limit stimulation at night    LOC better again but still confused about some issues, oriented x 3-4  Okay to DC sitter    Encounter for weaning from ventilator (HCC)  Assessment & Plan  Intubated for AVR on 12/20  Extubated later in the evening  RT/O2 protocols  Encouraged IS again, mobilizing as rhythm allows  Room air trial 12/23    Essential hypertension- (present on admission)  Assessment & Plan  Holding BP medications today  Resume ACE inhibitor as clinically appropriate  Junctional rhythm by EKG a.m. 12/23-hold beta-blockers/AV karen agents for now    Thrombocytopenia (HCC)  Assessment & Plan  Platelet count now trending up-likely down secondary to the same reasons he is anemic  Juanjo 136 currently 141  With frequent transfusions ideally keep platelet count greater than  K range  Serial CBC    Aneurysm of ascending aorta without rupture (HCC)- (present on admission)  Assessment & Plan  Only 4.4cm and not repaired  Would likely benefit from outpatient follow-up imaging      Pure hypercholesterolemia- (present on admission)  Assessment & Plan  Cont home rosuvastatin 10mg PO daily   Dietary education as needed    Bicuspid aortic valve- (present on admission)  Assessment & Plan  S/p AVR on 12/20         VTE:  Lovenox  Ulcer: PPI  Lines: Central Line  Ongoing indication addressed, Arterial Line  D/C'd today, and Zamora Catheter  Ongoing indication addressed    I have performed a physical exam and reviewed and updated ROS and Plan today (12/24/2024). In review of yesterday's note (12/23/2024), there are no changes except as documented above.     Discussed patient condition and risk of morbidity and/or mortality with Family, RN, RT, Pharmacy, Charge nurse / hot rounds, Patient, and cardiology and [facility]    The patient remains critically ill.  Infusing IV amiodarone.  Keeping pacer wires in case active pacing required.  Critical care time = 45 minutes in directly  providing and coordinating critical care and extensive data review.  No time overlap and excludes procedures.

## 2024-12-24 NOTE — CARE PLAN
The patient is Stable - Low risk of patient condition declining or worsening    Shift Goals  Clinical Goals: A&Ox4, DC tele sitter, walk, IS, shower, pain control  Patient Goals: sleep  Family Goals: Patient comfort    Progress made toward(s) clinical / shift goals:  Patient is A&Ox4, yet he is forgetful and impulsive, therefore the tele sitter was not discontinued. Patient walked, used the IS, got in the shower, and pain was controlled.     Patient is not progressing towards the following goals: tele sitter was not able to be discontinued.        Problem: Post Op Day 3 CABG/Heart Valve replacement  Goal: Optimal care of the post op CABG/Heart Valve replacement post op day 3  Intervention: Daily weights in the morning  Note: Daily weight obtained by NOC RN: 91.7kg  Intervention: Shower daily and clean incisions twice daily with soap and water  Note: Patient showered and his incisions were cleaned with soap and water.   Intervention: Up in chair for all meals  Note: Patient has been up to the chair for all meals, with plans for him to be up to the chair for dinner too.   Intervention: Ambulate 4 times daily, increasing the distance each time  Note: Patient has ambulated up to the bathroom 3 times, and around the unit 2 times this shift.  Intervention: IS q 1 hour while awake and record best IS volume  Note: Best IS: 1750  Intervention: Consider removal of kidd, chest tube and pacer wires if not already done  Note: Kidd was removed and patient has voided. Chest tubes stayed for high chest tube output. Pacing wires were not discontinued because patient is in a junctional rhythm.      Problem: Pain - Standard  Goal: Alleviation of pain or a reduction in pain to the patient’s comfort goal  Outcome: Progressing  Note: Patient has had a low level of pain this shift. He has been educated to  his pain when using his IS or taking a deep breath in, which he is able to do. His pain has been controlled with the scheduled  tylenol.

## 2024-12-24 NOTE — CARE PLAN
The patient is Watcher - Medium risk of patient condition declining or worsening    Shift Goals  Clinical Goals: safety, oxygen saturation greater than 90%, MAP greater than 65  Patient Goals: rest/comfort  Family Goals: updates    Progress made toward(s) clinical / shift goals:    Problem: Post Op Day 3 CABG/Heart Valve replacement  Goal: Optimal care of the post op CABG/Heart Valve replacement post op day 3  Intervention: Daily weights in the morning  Note: Completed with the standing scale.  Intervention: Shower daily and clean incisions twice daily with soap and water  Note: Incision care completed before bed. Daily showers during day shift.   Intervention: Up in chair for all meals  Note: Up to the chair for meals.   Intervention: Ambulate 4 times daily, increasing the distance each time  Note: Completed 450 ft ambulation before bedtime. Completed 450 ft ambulation before getting up to the chair.   Intervention: IS q 1 hour while awake and record best IS volume  Note: Best IS was 1000.   Intervention: Consider removal of kidd, chest tube and pacer wires if not already done  Note: Kidd removed. Chest tube and pacer wires in place. CTS will re-evaluate during day shift.   Intervention: Assess need for case management and  for discharge planning  Note: See CTS note.   Intervention: Discharge planning (See discharge barriers/planning problem on care plan)  Note: See CTS note.      Problem: Pain - Standard  Goal: Alleviation of pain or a reduction in pain to the patient’s comfort goal  Outcome: Progressing  Note: Medication available per MAR.     Problem: Fall Risk  Goal: Patient will remain free from falls  Outcome: Progressing

## 2024-12-24 NOTE — THERAPY
Physical Therapy   Initial Evaluation     Patient Name: Lionel Epperson  Age:  69 y.o., Sex:  male  Medical Record #: [id number]  Today's Date: 12/24/2024     Precautions  Precautions: Fall Risk;Cardiac Precautions (See Comments);Sternal Precautions (See Comments)    Assessment  Patient is 69 y.o. male with a diagnosis of Aortic root replacement, AAA repair.  Additional factors influencing patient status / progress : today, pt is alert and able to follow precautions well, conversation is appropriate, no unusual comments or questions as were noted yesterday. Pt is supervised with transfers, chooses to hug pillow, supervised with ambulation around entire unit with FWW, good activity tolerance, able to have conversation during ambulation. Pt's wife is concerned about his confusion yesterday, pleased to see that he seems better today. Education packet is reviewed in detail with pt's wife, very attentive and asking good questions. Wife asking about shower advice, OT is updated and will come see pt. See details below. .      Plan    Physical Therapy Initial Treatment Plan   Treatment Plan : Bed Mobility, Gait Training, Neuro Re-Education / Balance, Therapeutic Activities, Self Care / Home Evaluation  Treatment Frequency: 4 Times per Week  Duration: Until Therapy Goals Met    DC Equipment Recommendations: Front-Wheel Walker  Discharge Recommendations: Recommend home health for continued physical therapy services (depending on progress.)            Objective       12/24/24 0845   Initial Contact Note    Initial Contact Note Order Received and Verified, Physical Therapy Evaluation in Progress with Full Report to Follow.   Precautions   Precautions Fall Risk;Cardiac Precautions (See Comments);Sternal Precautions (See Comments)   Vitals   Pulse 69  (with ambulation)   O2 Delivery Device None - Room Air   Pain 0 - 10 Group   Therapist Pain Assessment During Activity;Nurse Notified;0   Prior Living Situation   Prior Services  "None   Housing / Facility 1 Story House   Steps Into Home 0   Steps In Home 0   Equipment Owned None   Lives with - Patient's Self Care Capacity Spouse  (Femi, works from home and can assist as needed)   Prior Level of Functional Mobility   Bed Mobility Independent  (mechanical HOB at home)   Transfer Status Independent   Ambulation Independent   Ambulation Distance community, very active   Assistive Devices Used None   Stairs Independent   Cognition    Level of Consciousness Alert   Comments pt shows good compliance with sternal precautions, no unusual comments/questions as were noted yesterday. Pt reports still feeling a bit \"off\".   Active ROM Lower Body    Active ROM Lower Body  WDL   Strength Lower Body   Lower Body Strength  WDL   Balance Assessment   Sitting Balance (Static) Good   Sitting Balance (Dynamic) Good   Standing Balance (Static) Fair   Standing Balance (Dynamic) Fair   Weight Shift Sitting Good   Weight Shift Standing Good   Comments with FWW   Bed Mobility    Supine to Sit   (up chair.)   Sit to Supine   (up chair to visit with friend)   Scooting Supervised  (hugs pillow, good seated scoot)   Gait Analysis   Gait Level Of Assist Supervised   Assistive Device Front Wheel Walker   Distance (Feet) 600   # of Times Distance was Traveled 1   Deviation   (steady gait, pt able to talk throughout, mild SOB.)   Functional Mobility   Sit to Stand Supervised   Bed, Chair, Wheelchair Transfer Supervised   Transfer Method Stand Step   6 Clicks Assessment - How much HELP from from another person do you currently need... (If the patient hasn't done an activity recently, how much help from another person do you think he/she would need if he/she tried?)   Turning from your back to your side while in a flat bed without using bedrails? 3   Moving from lying on your back to sitting on the side of a flat bed without using bedrails? 3   Moving to and from a bed to a chair (including a wheelchair)? 4   Standing up from " a chair using your arms (e.g., wheelchair, or bedside chair)? 4   Walking in hospital room? 4   Climbing 3-5 steps with a railing? 4   6 clicks Mobility Score 22   Activity Tolerance   Sitting in Chair 20+   Standing 5+   Short Term Goals    Short Term Goal # 1 Pt will perform bed mobility with HOB up (mechanical HOB at home) no rail and supervision in 6 visits   Short Term Goal # 2 Pt will perform all transfers with mod indep in 6 visits to improve functional indep.   Short Term Goal # 3 Pt will ambulate x 200 feet using FWW vs no AD with supervision in 6 visits to improve functional indep.   Short Term Goal # 4 Pt and wife with review education handout and ask questions as needed in 6 visits.   Education Group   Education Provided Cardiac Precautions;Sternal Precautions   Cardiac Precautions Patient Response Patient;Family;Acceptance;Explanation;Verbal Demonstration   Sternal Precautions Patient Response Patient;Family;Acceptance;Explanation;Handout;Action Demonstration;Verbal Demonstration;Demonstration   Additional Comments review of packet with wife in detail, review of sternal precautions again with pt who is showing good understanding today.   Physical Therapy Initial Treatment Plan    Treatment Plan  Bed Mobility;Gait Training;Neuro Re-Education / Balance;Therapeutic Activities;Self Care / Home Evaluation   Treatment Frequency 4 Times per Week   Duration Until Therapy Goals Met   Problem List    Problems Impaired Bed Mobility;Impaired Transfers;Impaired Ambulation;Impaired Balance;Limited Knowledge of Post-Op Precautions   Anticipated Discharge Equipment and Recommendations   DC Equipment Recommendations Front-Wheel Walker   Discharge Recommendations Recommend home health for continued physical therapy services  (depending on progress.)   Interdisciplinary Plan of Care Collaboration   IDT Collaboration with  Nursing   Patient Position at End of Therapy Seated;Call Light within Reach;Tray Table within  Reach;Phone within Reach;Family / Friend in Room   Collaboration Comments nsg updated   Session Information   Date / Session Number  12/24-1 (1/4, 12/30)

## 2024-12-25 ENCOUNTER — PHARMACY VISIT (OUTPATIENT)
Dept: PHARMACY | Facility: MEDICAL CENTER | Age: 69
End: 2024-12-25
Payer: COMMERCIAL

## 2024-12-25 VITALS
TEMPERATURE: 97.5 F | DIASTOLIC BLOOD PRESSURE: 75 MMHG | BODY MASS INDEX: 25.62 KG/M2 | HEART RATE: 58 BPM | SYSTOLIC BLOOD PRESSURE: 178 MMHG | RESPIRATION RATE: 18 BRPM | OXYGEN SATURATION: 92 % | WEIGHT: 193.34 LBS | HEIGHT: 73 IN

## 2024-12-25 LAB
ANION GAP SERPL CALC-SCNC: 12 MMOL/L (ref 7–16)
BUN SERPL-MCNC: 26 MG/DL (ref 8–22)
CALCIUM SERPL-MCNC: 8.6 MG/DL (ref 8.5–10.5)
CHLORIDE SERPL-SCNC: 100 MMOL/L (ref 96–112)
CO2 SERPL-SCNC: 25 MMOL/L (ref 20–33)
CREAT SERPL-MCNC: 1.18 MG/DL (ref 0.5–1.4)
ERYTHROCYTE [DISTWIDTH] IN BLOOD BY AUTOMATED COUNT: 44.9 FL (ref 35.9–50)
GFR SERPLBLD CREATININE-BSD FMLA CKD-EPI: 67 ML/MIN/1.73 M 2
GLUCOSE SERPL-MCNC: 115 MG/DL (ref 65–99)
HCT VFR BLD AUTO: 29.4 % (ref 42–52)
HGB BLD-MCNC: 9.6 G/DL (ref 14–18)
MCH RBC QN AUTO: 29.2 PG (ref 27–33)
MCHC RBC AUTO-ENTMCNC: 32.7 G/DL (ref 32.3–36.5)
MCV RBC AUTO: 89.4 FL (ref 81.4–97.8)
PLATELET # BLD AUTO: 242 K/UL (ref 164–446)
PLATELETS.RETICULATED NFR BLD AUTO: 7.1 % (ref 0.6–13.1)
PMV BLD AUTO: 11.1 FL (ref 9–12.9)
POTASSIUM SERPL-SCNC: 3.9 MMOL/L (ref 3.6–5.5)
RBC # BLD AUTO: 3.29 M/UL (ref 4.7–6.1)
SODIUM SERPL-SCNC: 137 MMOL/L (ref 135–145)
WBC # BLD AUTO: 15.9 K/UL (ref 4.8–10.8)

## 2024-12-25 PROCEDURE — 700102 HCHG RX REV CODE 250 W/ 637 OVERRIDE(OP)

## 2024-12-25 PROCEDURE — 85027 COMPLETE CBC AUTOMATED: CPT

## 2024-12-25 PROCEDURE — 80048 BASIC METABOLIC PNL TOTAL CA: CPT

## 2024-12-25 PROCEDURE — 85055 RETICULATED PLATELET ASSAY: CPT

## 2024-12-25 PROCEDURE — A9270 NON-COVERED ITEM OR SERVICE: HCPCS

## 2024-12-25 PROCEDURE — RXMED WILLOW AMBULATORY MEDICATION CHARGE: Performed by: NURSE PRACTITIONER

## 2024-12-25 PROCEDURE — 99024 POSTOP FOLLOW-UP VISIT: CPT | Performed by: NURSE PRACTITIONER

## 2024-12-25 PROCEDURE — 700111 HCHG RX REV CODE 636 W/ 250 OVERRIDE (IP): Performed by: NURSE PRACTITIONER

## 2024-12-25 PROCEDURE — 700102 HCHG RX REV CODE 250 W/ 637 OVERRIDE(OP): Performed by: NURSE PRACTITIONER

## 2024-12-25 PROCEDURE — A9270 NON-COVERED ITEM OR SERVICE: HCPCS | Performed by: NURSE PRACTITIONER

## 2024-12-25 RX ORDER — POTASSIUM CHLORIDE 750 MG/1
10 TABLET, EXTENDED RELEASE ORAL DAILY
Qty: 30 TABLET | Refills: 0 | Status: SHIPPED | OUTPATIENT
Start: 2024-12-25

## 2024-12-25 RX ORDER — LISINOPRIL 10 MG/1
10 TABLET ORAL DAILY
Qty: 30 TABLET | Refills: 2 | Status: SHIPPED | OUTPATIENT
Start: 2024-12-25

## 2024-12-25 RX ORDER — ACETAMINOPHEN 500 MG
1000 TABLET ORAL EVERY 6 HOURS PRN
COMMUNITY
Start: 2024-12-25

## 2024-12-25 RX ORDER — LISINOPRIL 10 MG/1
10 TABLET ORAL
Status: DISCONTINUED | OUTPATIENT
Start: 2024-12-25 | End: 2024-12-25 | Stop reason: HOSPADM

## 2024-12-25 RX ORDER — FUROSEMIDE 20 MG/1
20 TABLET ORAL DAILY
Qty: 30 TABLET | Refills: 0 | Status: SHIPPED | OUTPATIENT
Start: 2024-12-25

## 2024-12-25 RX ORDER — AMIODARONE HYDROCHLORIDE 200 MG/1
TABLET ORAL
Qty: 56 TABLET | Refills: 0 | Status: SHIPPED | OUTPATIENT
Start: 2024-12-25 | End: 2025-02-07

## 2024-12-25 RX ORDER — OXYCODONE HYDROCHLORIDE 5 MG/1
5 TABLET ORAL EVERY 6 HOURS PRN
Qty: 56 TABLET | Refills: 0 | Status: SHIPPED | OUTPATIENT
Start: 2024-12-25 | End: 2025-01-08

## 2024-12-25 RX ADMIN — Medication 1 APPLICATOR: at 08:43

## 2024-12-25 RX ADMIN — OMEPRAZOLE 20 MG: 20 CAPSULE, DELAYED RELEASE ORAL at 04:46

## 2024-12-25 RX ADMIN — LISINOPRIL 10 MG: 10 TABLET ORAL at 08:43

## 2024-12-25 RX ADMIN — FUROSEMIDE 40 MG: 10 INJECTION INTRAMUSCULAR; INTRAVENOUS at 04:46

## 2024-12-25 RX ADMIN — POTASSIUM CHLORIDE 40 MEQ: 20 TABLET, EXTENDED RELEASE ORAL at 04:46

## 2024-12-25 RX ADMIN — ASPIRIN 81 MG: 81 TABLET, COATED ORAL at 04:46

## 2024-12-25 RX ADMIN — AMIODARONE HYDROCHLORIDE 400 MG: 200 TABLET ORAL at 04:46

## 2024-12-25 ASSESSMENT — LIFESTYLE VARIABLES
TOTAL SCORE: 0
HAVE PEOPLE ANNOYED YOU BY CRITICIZING YOUR DRINKING: NO
TOTAL SCORE: 0
HOW MANY TIMES IN THE PAST YEAR HAVE YOU HAD 5 OR MORE DRINKS IN A DAY: 0
AVERAGE NUMBER OF DAYS PER WEEK YOU HAVE A DRINK CONTAINING ALCOHOL: 0
EVER FELT BAD OR GUILTY ABOUT YOUR DRINKING: NO
HAVE YOU EVER FELT YOU SHOULD CUT DOWN ON YOUR DRINKING: NO
CONSUMPTION TOTAL: NEGATIVE
ON A TYPICAL DAY WHEN YOU DRINK ALCOHOL HOW MANY DRINKS DO YOU HAVE: 0
TOTAL SCORE: 0
ALCOHOL_USE: NO
EVER HAD A DRINK FIRST THING IN THE MORNING TO STEADY YOUR NERVES TO GET RID OF A HANGOVER: NO

## 2024-12-25 ASSESSMENT — COGNITIVE AND FUNCTIONAL STATUS - GENERAL
SUGGESTED CMS G CODE MODIFIER DAILY ACTIVITY: CK
TOILETING: A LITTLE
DRESSING REGULAR UPPER BODY CLOTHING: A LITTLE
DRESSING REGULAR LOWER BODY CLOTHING: A LITTLE
MOBILITY SCORE: 24
HELP NEEDED FOR BATHING: A LOT
DAILY ACTIVITIY SCORE: 19
SUGGESTED CMS G CODE MODIFIER MOBILITY: CH

## 2024-12-25 ASSESSMENT — SOCIAL DETERMINANTS OF HEALTH (SDOH)
WITHIN THE PAST 12 MONTHS, YOU WORRIED THAT YOUR FOOD WOULD RUN OUT BEFORE YOU GOT THE MONEY TO BUY MORE: NEVER TRUE
IN THE PAST 12 MONTHS, HAS THE ELECTRIC, GAS, OIL, OR WATER COMPANY THREATENED TO SHUT OFF SERVICE IN YOUR HOME?: NO
WITHIN THE PAST 12 MONTHS, THE FOOD YOU BOUGHT JUST DIDN'T LAST AND YOU DIDN'T HAVE MONEY TO GET MORE: NEVER TRUE

## 2024-12-25 ASSESSMENT — FIBROSIS 4 INDEX: FIB4 SCORE: 2.65

## 2024-12-25 NOTE — CARE PLAN
The patient is Stable - Low risk of patient condition declining or worsening    Shift Goals  Clinical Goals: Mobilize, IS, Monitor VS/Labs  Patient Goals: comfort, sleep, go home  Family Goals: safety, comfort    Progress made toward(s) clinical / shift goals:    Problem: Post Op Day 4 CABG/Heart Valve Replacement  Goal: Optimal care of the Post Op CABG/Heart Valve replacement Post Op Day 4  Outcome: Progressing   Oral Care done, I/O's documented, AM weight done. Assessment wise, Edema is gone, lung sounds clear. Patient ambulating and performing all adl's including incision care well and verbalizing appropriate care. Pulling 2000 on the IS. Patient verbalizing he has additional assistance at home for care. HR stable on the monitor aside from one brief rate controlled episode of afib up to 110.    Problem: Hemodynamics  Goal: Patient's hemodynamics, fluid balance and neurologic status will be stable or improve  Outcome: Progressing       Patient is not progressing towards the following goals:

## 2024-12-25 NOTE — CARE PLAN
Problem: Knowledge Deficit - Standard  Goal: Patient and family/care givers will demonstrate understanding of plan of care, disease process/condition, diagnostic tests and medications  Outcome: Met     Problem: Pre Op  Goal: Optimal preparation for CABG/Heart Valve surgery  Outcome: Met     Problem: Day of surgery post CABG/Heart valve replacement  Goal: Stabilization in immediate post op period  Outcome: Met     Problem: Post Op Day 1 CABG/Heart Valve Replacement  Goal: Optimal care of the post op CABG/heart valve replacement Post Op Day 1  Outcome: Met     Problem: Post op day 2 CABG/Heart Valve Replacement  Goal: Optimal care of the post op CABG/heart valve replacement post op day 2  Outcome: Met     Problem: Post Op Day 3 CABG/Heart Valve replacement  Goal: Optimal care of the post op CABG/Heart Valve replacement post op day 3  Outcome: Met     Problem: Post Op Day 4 CABG/Heart Valve Replacement  Goal: Optimal care of the Post Op CABG/Heart Valve replacement Post Op Day 4  Outcome: Met     Problem: Post Op Day 5 CABG/Heart Valve Replacement  Goal: Optimal care of the Post Op CABG/Heart Valve replacement Post Op Day 5  Outcome: Met     Problem: Hemodynamics  Goal: Patient's hemodynamics, fluid balance and neurologic status will be stable or improve  Outcome: Met     Problem: Respiratory  Goal: Patient will achieve/maintain optimum respiratory ventilation and gas exchange  Outcome: Met     Problem: Risk for Aspiration  Goal: Patient's risk for aspiration will be absent or decrease  Outcome: Met     Problem: Nutrition - Advanced  Goal: Patient will display progressive weight gain toward goal have adequate food and fluid intake  Outcome: Met     Problem: Self Care  Goal: Patient will have the ability to perform ADLs independently or with assistance (bathe, groom, dress, toilet and feed)  Outcome: Met     Problem: Bowel Elimination - Post Surgical  Goal: Patient will resume regular bowel sounds and function with no  discomfort or distention  Outcome: Met     Problem: Pain - Standard  Goal: Alleviation of pain or a reduction in pain to the patient’s comfort goal  Outcome: Met     Problem: Fall Risk  Goal: Patient will remain free from falls  Outcome: Met   The patient is Stable - Low risk of patient condition declining or worsening    Shift Goals  Clinical Goals: Remain free of falls, no skin breakdown  Patient Goals: Rest and discharge  Family Goals: GHASSAN    Progress made toward(s) clinical / shift goals:      Pt educated on plan of care, pt verbalizes understanding, reinforcement needed. Pt educated on pain/anxiety scales, alleviating factors, pain/anxiety medications, and side effects, and need for pain/anxiety reassessment, pt pain/anxiety controlled at this time, reinforcement needed. Pt educated on the need to reposition frequently and remain dry to avoid skin breakdown, reinforcement needed, no further skin breakdown during shift. Fall risk education provided to pt, pt educated about the need to call for assistance while attempting to ambulate, reinforcement needed, pt remains free of falls this shift. Pt given post open heart surgical instructions.

## 2024-12-25 NOTE — PROGRESS NOTES
Pt discharged home to self care. Pt's IV access and telemetry box removed prior to leaving unit. Pt was fully clothed and stated he had all of his belongings with him. Pt was A&Ox4, VSS, and on RA. Pt was given post open heart surgery instructions. Pt's medications were delivered bedside.

## 2024-12-25 NOTE — PROGRESS NOTES
Report received from night RN at 0700. PT seen at bedside and pt care assumed. Pt is A&Ox4,  on RA, denies pain. PIV flushed and intact. PT is SB on telemetry. PT is up self.     Plan of care reviewed with pt, call light, phone, and personal belongings within reach. Bed alarm on, and bed in low locked position. All pt's needs met at this time.    Bedside report received from off going RN/tech: JEAN-CLAUDE Raymond, assumed care of patient.     Fall Risk Score: LOW RISK  Fall risk interventions in place: Provide patient/family education based on risk assessment and Educate patient/family to call staff for assistance when getting out of bed  Bed type: Regular (Jimmy Score less than 17 interventions in place)  Patient on cardiac monitor: Yes  IVF/IV medications: Not Applicable   Oxygen: Room Air and No oxygen tank in room  Bedside sitter: Not Applicable   Isolation: Not applicable

## 2024-12-25 NOTE — CARE PLAN
The patient is Stable - Low risk of patient condition declining or worsening    Shift Goals  Clinical Goals: Mobilize, IS  Patient Goals: get out of chair  Family Goals: Safety    Progress made toward(s) clinical / shift goals: mediastinal tubes removed, patient on RA, showered independently, plan for discharge home on 12/25    Patient is not progressing towards the following goals:Patient still has pacer wires d/t rhythm abnormalities overnight, keep one more day.      Problem: Knowledge Deficit - Standard  Goal: Patient and family/care givers will demonstrate understanding of plan of care, disease process/condition, diagnostic tests and medications  Outcome: Progressing     Problem: Post Op Day 4 CABG/Heart Valve Replacement  Goal: Optimal care of the Post Op CABG/Heart Valve replacement Post Op Day 4  Outcome: Progressing  Intervention: Daily weights in the morning  Note: Patient weighed daily on stand up scale  Intervention: Shower daily and clean incisions twice daily with soap and water  Note: Patient showered independently and demonstrated appropriate sternal incision care  Intervention: Up in chair for all meals  Note: Patient up to chair for all meals with fair PO intake  Intervention: Ambulate 4 times daily, increasing the distance each time  Note: Patient ambulatory to mo with no assist  Intervention: IS q 1 hour while awake and record best IS volume  Note: Patient demonstrates good understanding of IS use and rationale  Intervention: Consider removal of kidd, chest tube and pacer wires if not already done  Note: Kidd and CT removed, patient still has pacer wires d/t jxnal rhythm  Intervention: Discharge Education  Note: Discharge education started with patient and wife in prep for discharge on 12/25  Intervention: Add special instructions for cardiac surgery discharge instructions to after visit summary and review with patient  Note: CTS discharge instructions added in prep for discharge on 12/25      Problem: Respiratory  Goal: Patient will achieve/maintain optimum respiratory ventilation and gas exchange  Outcome: Progressing  Note: RA     Problem: Self Care  Goal: Patient will have the ability to perform ADLs independently or with assistance (bathe, groom, dress, toilet and feed)  Outcome: Progressing     Problem: Pain - Standard  Goal: Alleviation of pain or a reduction in pain to the patient’s comfort goal  Outcome: Progressing     Problem: Fall Risk  Goal: Patient will remain free from falls  Outcome: Progressing

## 2024-12-25 NOTE — DISCHARGE INSTRUCTIONS
DIVISION OF CARDIAC SURGERY   DISCHARGE INSTRUCTIONS    Activity:    NO driving for 4 weeks after surgery. You may ride as a passenger.  NO lifting, pushing, or pulling more than 10 pounds for 6 weeks.  For the next 6 weeks, keep your elbows close to your body and move within a pain-free motion when lifting, pushing or pulling.  Do not stretch both arms backwards at the same time.    Walk at least 4 times per day, there is no maximum. The goal is to increase your distance over time.  Continue using incentive spirometer for 2 weeks or until your baseline volume is reached.  If you are going home on oxygen and you were not on oxygen prior to surgery, keep using until you are oxygen free.  Weigh yourself daily.  Call your Cardiologist for a weight gain of 3 or more pounds in 1 day or more than 5 pounds in 7 days.  Take all of your medications as prescribed. Do not use a pill box for the first month at home. If you have questions, please call your nurse navigator at [phone].  Continue to wear the LEXA (compression) stockings for 2-4 weeks or until all swelling is gone. You may take them off when you are in bed or when your legs are elevated.    Incision Care:    Make sure to clean your incision(s) TWICE DAILY.  Once by showering AND once using the no rinse Foam cleanser provided in the hospital.  During the shower, cleanse the incision(s) with a perfume and dye free soap (Dial, Dove, [place])  Use gentle pressure and rub up and down over incision with your hands or a washcloth. Rinse off and pat incision(s) dry with clean towel.  Keep the incision open to air. No creams or lotions on your incision(s). No baths.  If there is any increased redness or swelling, separation of the incision line, or thick drainage from any of your incisions, call the Cardiac Surgeons ([phone]).      General Instructions:    You have been referred to Cardiac Rehab.  You can start Cardiac Rehab 30 days after surgery.  If you do  not have an appointment at the time of discharge call [phone] to schedule an appointment.  Your Primary Care Doctor typically handles home oxygen. Oxygen may be stopped when your oxygen level is consistently greater than 90.  Check with your Primary Care Doctor if you are unsure.  Take all of your medications (including pain medications) as prescribed.  Taking medications other than prescribed can result in serious injury.    For Patients Discharged with Narcotic Pain Medication:     If a refill is needed, understand that only 1 refill will be provided and you must come to the Cardiac Surgeons’ office for an appointment (72 hours’ notice is required to schedule and there are no weekend appointments).  If the pain medications you are discharged on are not working, you will need to bring your remaining prescription into the office in order to receive a new prescription.  If you were taking narcotics prior to your heart surgery, the Cardiac Surgeons will provide you with one prescription and additional medications will need to be provided by your pain management doctor.  Do not drink alcohol while taking narcotics.  Lost or stolen medications will not be refilled.  If medications are stolen, report to law enforcement.    Contact Cardiac Surgery at [phone] if you have any questions.

## 2024-12-25 NOTE — PROGRESS NOTES
Monitor Summary  Rhythm: SB/Junctional Rhythm/SR 54-61    Ectopy: Rare PVC, Rare PAC, Brief few minutes of Afib . Patient self converted out.    Intervals: .12/.10/.53

## 2024-12-25 NOTE — DISCHARGE SUMMARY
DISCHARGE SUMMARY    ADMISSION DATE: 12/20/2024    DISCHARGE DATE: 12/25/24    ADMITTING DIAGNOSES: Severe aortic stenosis, bicuspid aortic valve, ascending aortic aneurysm (4.4 cm), hypertension, dyslipidemia     DISCHARGE DIAGNOSES: Severe aortic stenosis, bicuspid aortic valve, ascending aortic aneurysm (4.4 cm), hypertension, dyslipidemia     PROCEDURES PERFORMED:   12/20/24 Dr. Mina  AORTIC ROOT REPLACEMENT (Bio-Bentall, 27 mm Konect Rogel valved conduit, coronary artery reimplantation) and intraoperative transesophageal echocardiography     HISTORY OF PRESENT ILLNESS:  The patient is a 69 y.o. male with past medical history of hypertension, hyperlipidemia, bicuspid aortic valve, ascending aortic enlargement and aortic stenosis. Today he states he is in his usual state of health. He is very active and exercises daily. He has no exercise limitations. He does have dizziness when lying down but attributes it to his BPV. He denies shortness of breath, chest pain, lower extremity edema, syncope, orthopnea, or PND.       HOSPITAL COURSE:   POD 1  HDS, SB, neuro intact, wounds intact, abdomen soft, fluid balance positive, wt up, 2 L NC. H/H 8.4/24.4, K 5.6, 1.57. Plan:  Diurese. Monitor renal function and h/h. Keep chest tubes, pacing wires, and kidd. IS/ambulate.      POD 2  HDS, SB/SR, confused and agitated- dex and haldol overnight, wounds intact, abdomen soft, fluid balance positive, wt up,  4 L NC. H/H 6.6/18.9 -received 2 units RBCs overnight. Cr 2.42, adequate UOP. Plan: Wean dex, mobilize, reorient.  Continue aggressive diuresis, keep kidd for strict I/O. Keep chest tubes and pacing wires. IS/ambulate.      POD 3  HDS, junctional 60s, neuro intact- agitation improved- tele sitter in place, wounds intact, abdomen soft no BM, fluid balance negative, wt down, 1 L NC. Hbg 7.1- one unit RBC overnight. Cr trending down, good uop. 260 out of chest tubes overnight. Plan:  Reduce diuresis. Keep chest tubes.  Hold beta blockers and keep pacing wires for junctional rhythm. IS/ambulate.      POD 4  HDS, a fib last night, junctional this AM, now SB/SR, neuro intact, wounds intact, abdomen soft +BM, fluid balance negative, wt down,  room air. 100 out of chest tubes. Plan:  Stop amio gtt, continue PO. Keep pacing wires one more day. Remove chest tubes. IS/ambulate. Transfer to tele. Plan home tomorrow.     POD 5  HDS, SB/SR brief run of a fib overnight, neuro intact, wounds intact, abdomen soft +BM, fluid balance negative, wt down,  room air.  Plan: Add lisinopril and eliquis. Discharge home today.     TELEMETRY:  12/21 SB  12/22 SB/SR  12/23 junctional  12/24 a fib to junctional to SR  12/25 SB/SR, brief a fib run last night    RECENT LABS:     Lab Results   Component Value Date/Time    SODIUM 137 12/25/2024 04:50 AM    POTASSIUM 3.9 12/25/2024 04:50 AM    CHLORIDE 100 12/25/2024 04:50 AM    CO2 25 12/25/2024 04:50 AM    GLUCOSE 115 (H) 12/25/2024 04:50 AM    BUN 26 (H) 12/25/2024 04:50 AM    CREATININE 1.18 12/25/2024 04:50 AM      Lab Results   Component Value Date/Time    WBC 15.9 (H) 12/25/2024 04:50 AM    RBC 3.29 (L) 12/25/2024 04:50 AM    HEMOGLOBIN 9.6 (L) 12/25/2024 04:50 AM    HEMATOCRIT 29.4 (L) 12/25/2024 04:50 AM    MCV 89.4 12/25/2024 04:50 AM    MCH 29.2 12/25/2024 04:50 AM    MCHC 32.7 12/25/2024 04:50 AM    MPV 11.1 12/25/2024 04:50 AM    NEUTSPOLYS 55.10 12/18/2024 08:13 AM    LYMPHOCYTES 32.80 12/18/2024 08:13 AM    MONOCYTES 9.60 12/18/2024 08:13 AM    EOSINOPHILS 1.40 12/18/2024 08:13 AM    BASOPHILS 0.80 12/18/2024 08:13 AM      Lab Results   Component Value Date/Time    PROTHROMBTM 19.8 (H) 12/20/2024 01:40 PM    INR 1.67 (H) 12/20/2024 01:40 PM        Fluids:    Intake/Output Summary (Last 24 hours) at 12/25/2024 0841  Last data filed at 12/25/2024 0400  Gross per 24 hour   Intake 663.49 ml   Output 1060 ml   Net -396.51 ml     Admit weight: Weight: 89.2 kg (196 lb 10.4 oz)  Current weight: Weight:  87.7 kg (193 lb 5.5 oz) (12/25/24 0649)    ALLERGIES:     Patient has no known allergies.    EJECTION FRACTION:  60%    CARDIAC MEDICATIONS:    ASA - Yes    Plavix - No; contraindicated because of On Coumadin / NOAC    Post-operative Beta Blockers - No; contraindicated because of Sinus bradycardia/heart block    Ace/ARB- Yes    ARNI-  No; contraindicated because of Normal EF    Statin - Yes    Aldactone- No; contraindicated because of Normal EF    SGLT2i-  No; contraindicated because of Normal EF    DISCHARGE MEDICATIONS:      Medication List        START taking these medications        Instructions   acetaminophen 500 MG Tabs  Commonly known as: Tylenol   Take 2 Tablets by mouth every 6 hours as needed for Moderate Pain or Mild Pain.  Dose: 1,000 mg     amiodarone 200 MG Tabs  Commonly known as: Cordarone   Doctor's comments: Take 2 Tablets BID x 7 days, Then Take Two Tablets daily for 7 days, Then Take 1 Tablet Daily.  Take 1 Tablet by mouth every day.  Dose: 200 mg     apixaban 5mg Tabs  Commonly known as: Eliquis   Take 1 Tablet by mouth 2 times a day.  Dose: 5 mg     furosemide 20 MG Tabs  Commonly known as: Lasix   Take 1 Tablet by mouth every day.  Dose: 20 mg     oxyCODONE immediate-release 5 MG Tabs  Commonly known as: Roxicodone   Take 1 Tablet by mouth every 6 hours as needed for Severe Pain for up to 14 days.  Dose: 5 mg     potassium chloride SA 10 MEQ Tbcr  Commonly known as: K-Dur   Take 1 Tablet by mouth every day.  Dose: 10 mEq            CHANGE how you take these medications        Instructions   lisinopril 10 MG Tabs  What changed:   medication strength  how much to take  Commonly known as: Prinivil   Take 1 Tablet by mouth every day.  Dose: 10 mg            CONTINUE taking these medications        Instructions   amoxicillin 500 MG Caps  Commonly known as: Amoxil      ASPIRIN 81 PO   Take 81 mg by mouth.  Dose: 81 mg     rosuvastatin 10 MG Tabs  Commonly known as: Crestor   Doctor's comments: AUSTEN  Code Needed  PT REQUESTING REFILL, PLEASE SUBMIT NEW RX.  Take 1 Tablet by mouth every evening.  Dose: 10 mg     VITAMIN D3 PO   Take  by mouth every day.              NARCOTIC PAIN MEDICATIONS:   In prescribing controlled substances to this patient, I certify that I have obtained and reviewed their medical history. I have also made a good eva effort to obtain applicable records from other providers who have treated the patient .    I have conducted a physical exam and documented it. I have reviewed the patient's prescription history as maintained by the [place] Prescription Monitoring Program.     I have assessed the patient’s risk for abuse, dependency, and addiction using the validated Opioid Risk Tool.    Given the above, I believe the benefits of controlled substance therapy outweigh the risks. The reasons for prescribing controlled substances include non-narcotic, oral analgesic alternatives have been inadequate for pain control. Accordingly, I have discussed the risk and benefits, treatment plan, and alternative therapies with the patient.     Pt understands this prescription is a controlled substance which is potentially habit-forming and its use is regulated by the [place]. It must be submitted to the pharmacy within 5 days of the date written and can not be called in or faxed to the pharmacy. Refills are subject to terms of a medicine agreement. Any refill requires a new prescription that must be obtained from this office during regular office hours Monday through Thursday 7 am to 4 pm. We ask for 72 hours notice to get an appointment for a narcotic pain medication refill. This medicine can cause nausea, significant constipation, sedation, confusion.     DIET:   Cardiac diet    DISCHARGE INSTRUCTIONS DISCUSSED WITH THE PATIENT:      1. NO driving for 4 weeks after surgery. You may ride as a passenger.  2. NO lifting of any item over 10 lbs (e.g. gallon of milk) for 6 weeks after surgery.  3. DO walk as much  as possible! Walk a minimum of once a day. Depending on your fatigue and comfort level, you may walk as much as you wish. There is no maximum.  4. Other physical activities (sex, housework, gardening, etc.) are OK after 4 weeks   5. Continue using incentive spirometer for 2 weeks, especially if going home on oxygen.    Incision Care:  1. SHOWER ONLY - no baths. Clean incision daily with plain Ivory ® soap or any other dye or perfume free soap. Then pat incision dry with clean towel. Avoid creams or lotions on the incision(s).  a. If there is any increase in redness or swelling, or separation of the incision line, or thick drainage* from any of the incisions, call right away  * Clear, thin drainage is not abnormal especially from the leg incision and/or                         chest tube sites.  2. Continue to wear your LEXA Stockings for 4 weeks. You may take off the stockings when in bed or when the legs are elevated.    Patient instructed to call [facility] cardiac surgery at [phone]  if any increased shortness of breath, uncontrolled pain, weight gain greater than 3 pounds in 1 day or 5 pounds in 1 week, SBP >140, HR <60 or redness swelling or drainage of incisions.      FOLLOW-UP:   Future Appointments   Date Time Provider Department Center   1/20/2025  1:00 PM CT RESOURCE PROVIDER [facility] None   1/22/2025  1:30 PM Shaggy Roberts M.D. [facility] None   5/19/2025  9:40 AM KHALIF George [facility] Blood

## 2024-12-25 NOTE — PROGRESS NOTES
Bedside report received from off going RN: Yolande, assumed care of patient.     Fall Risk Score: HIGH RISK  Fall risk interventions in place: Place yellow fall risk ID band on patient, Provide patient/family education based on risk assessment, Educate patient/family to call staff for assistance when getting out of bed, Place fall precaution signage outside patient door, Utilize bed/chair fall alarm, Notify charge of high risk for huddle, and Bed alarm connected correctly  Bed type: Regular (Jimmy Score less than 17 interventions in place)  Patient on cardiac monitor: Yes  IVF/IV medications: Not Applicable   Oxygen: Room Air  Bedside sitter: Not Applicable   Isolation: Not applicable

## 2024-12-30 LAB
ACT BLD: 510 SEC (ref 74–137)
ACT BLD: 608 SEC (ref 74–137)
BASE EXCESS BLDA CALC-SCNC: -3 MMOL/L (ref -4–3)
BASE EXCESS BLDA CALC-SCNC: -4 MMOL/L (ref -4–3)
BASE EXCESS BLDV CALC-SCNC: -2 MMOL/L (ref -2–3)
BODY TEMPERATURE: ABNORMAL DEGREES
CA-I BLD ISE-SCNC: 1.01 MMOL/L (ref 1.1–1.3)
CA-I BLD ISE-SCNC: 1.1 MMOL/L (ref 1.1–1.3)
CA-I BLD ISE-SCNC: 1.18 MMOL/L (ref 1.1–1.3)
CA-I BLD ISE-SCNC: 1.26 MMOL/L (ref 1.1–1.3)
CA-I BLD ISE-SCNC: 1.26 MMOL/L (ref 1.1–1.3)
CO2 BLDA-SCNC: 24 MMOL/L (ref 32–48)
CO2 BLDA-SCNC: 24 MMOL/L (ref 32–48)
CO2 BLDA-SCNC: 25 MMOL/L (ref 32–48)
CO2 BLDA-SCNC: 25 MMOL/L (ref 32–48)
CO2 BLDV-SCNC: 27 MMOL/L (ref 20–33)
HCO3 BLDA-SCNC: 22.4 MMOL/L (ref 21–28)
HCO3 BLDA-SCNC: 22.9 MMOL/L (ref 21–28)
HCO3 BLDA-SCNC: 23.3 MMOL/L (ref 21–28)
HCO3 BLDA-SCNC: 23.7 MMOL/L (ref 21–28)
HCO3 BLDV-SCNC: 25.5 MMOL/L (ref 22–29)
HCT VFR BLD CALC: 30 % (ref 42–52)
HCT VFR BLD CALC: 36 % (ref 42–52)
HCT VFR BLD CALC: 37 % (ref 42–52)
HCT VFR BLD CALC: 41 % (ref 42–52)
HCT VFR BLD CALC: 45 % (ref 42–52)
HGB BLD-MCNC: 10.2 G/DL (ref 14–18)
HGB BLD-MCNC: 12.2 G/DL (ref 14–18)
HGB BLD-MCNC: 12.6 G/DL (ref 14–18)
HGB BLD-MCNC: 13.9 G/DL (ref 14–18)
HGB BLD-MCNC: 15.3 G/DL (ref 14–18)
PCO2 BLDA: 41.5 MMHG (ref 32–48)
PCO2 BLDA: 42.5 MMHG (ref 32–48)
PCO2 BLDA: 47.1 MMHG (ref 32–48)
PCO2 BLDA: 49.7 MMHG (ref 32–48)
PCO2 BLDV: 54.1 MMHG (ref 38–54)
PCO2 TEMP ADJ BLDA: 40.6 MMHG (ref 32–48)
PCO2 TEMP ADJ BLDA: 40.6 MMHG (ref 32–48)
PCO2 TEMP ADJ BLDA: 42.3 MMHG (ref 32–48)
PCO2 TEMP ADJ BLDA: 47.6 MMHG (ref 32–48)
PCO2 TEMP ADJ BLDV: 50.7 MMHG (ref 38–54)
PH BLDA: 7.28 [PH] (ref 7.35–7.45)
PH BLDA: 7.31 [PH] (ref 7.35–7.45)
PH BLDA: 7.34 [PH] (ref 7.35–7.45)
PH BLDA: 7.34 [PH] (ref 7.35–7.45)
PH BLDV: 7.28 [PH] (ref 7.31–7.45)
PH TEMP ADJ BLDA: 7.29 [PH] (ref 7.35–7.45)
PH TEMP ADJ BLDA: 7.34 [PH] (ref 7.35–7.45)
PH TEMP ADJ BLDA: 7.35 [PH] (ref 7.35–7.45)
PH TEMP ADJ BLDA: 7.35 [PH] (ref 7.35–7.45)
PH TEMP ADJ BLDV: 7.3 [PH] (ref 7.31–7.45)
PO2 BLDA: 146 MMHG (ref 83–108)
PO2 BLDA: 206 MMHG (ref 83–108)
PO2 BLDA: 284 MMHG (ref 83–108)
PO2 BLDA: 307 MMHG (ref 83–108)
PO2 BLDV: 45 MMHG (ref 23–48)
PO2 TEMP ADJ BLDA: 140 MMHG (ref 64–87)
PO2 TEMP ADJ BLDA: 201 MMHG (ref 64–87)
PO2 TEMP ADJ BLDA: 273 MMHG (ref 64–87)
PO2 TEMP ADJ BLDA: 304 MMHG (ref 64–87)
PO2 TEMP ADJ BLDV: 41 MMHG (ref 23–48)
POTASSIUM BLD-SCNC: 3.7 MMOL/L (ref 3.6–5.5)
POTASSIUM BLD-SCNC: 4.2 MMOL/L (ref 3.6–5.5)
POTASSIUM BLD-SCNC: 4.2 MMOL/L (ref 3.6–5.5)
POTASSIUM BLD-SCNC: 4.9 MMOL/L (ref 3.6–5.5)
POTASSIUM BLD-SCNC: 5.4 MMOL/L (ref 3.6–5.5)
SAO2 % BLDA: 100 % (ref 93–99)
SAO2 % BLDA: 99 % (ref 93–99)
SAO2 % BLDV: 75 % (ref 60–85)
SODIUM BLD-SCNC: 137 MMOL/L (ref 135–145)
SODIUM BLD-SCNC: 138 MMOL/L (ref 135–145)
SODIUM BLD-SCNC: 138 MMOL/L (ref 135–145)
SODIUM BLD-SCNC: 141 MMOL/L (ref 135–145)
SODIUM BLD-SCNC: 141 MMOL/L (ref 135–145)
SPECIMEN DRAWN FROM PATIENT: ABNORMAL

## 2024-12-31 LAB — LV EJECT FRACT  99904: 40

## 2025-01-08 NOTE — PROGRESS NOTES
CHIEF COMPLAINT: Post-op visit     PROCEDURE:   12/20/24 Dr. Mina  AORTIC ROOT REPLACEMENT (Bio-Aditi, 27 mm Konect Rogel valved conduit, coronary artery reimplantation) and intraoperative transesophageal echocardiography     HPI: He is doing well postoperatively.  He has been walking on the treadmill for exercise.  His incisions are healing well.  His pain is well controlled.      BP (!) 140/76 (BP Location: Left arm, Patient Position: Sitting, BP Cuff Size: Adult)   Pulse 81   Temp 36.9 °C (98.5 °F) (Temporal)   Ht 1.829 m (6')   Wt 87.5 kg (193 lb)   SpO2 98%     PHYSICAL EXAM:  Cardiac: S1S2  Neuro:  AAO x 3  Resp:  CTA  Wounds:  CDI  Sternum:  Stable    PLAN: DC lasix and kdur.  Continue NOAC or 3 months or per your Cardiologist's recommendations.  We reviewed post operative sternal precautions, weight limits and driving precautions moving forward.  We reviewed SBE prophylaxis.      Overall, we are very pleased with the patient’s progress and we have instructed the patient to follow-up with us in the future should they have any concerns related to their surgery. Otherwise, we will see the patient on a PRN basis. The patient will continue to follow-up with their Cardiologist and PCP.  The patient has been informed that any further prescription refills should be done through their primary care physician and/or cardiologist.  They acknowledged understanding.  Thank you for allowing us to participate in the care of this very pleasant patient and please let us know if there is any way we may be of further assistance.

## 2025-01-13 ENCOUNTER — PATIENT MESSAGE (OUTPATIENT)
Dept: CARDIOLOGY | Facility: MEDICAL CENTER | Age: 70
End: 2025-01-13
Payer: MEDICARE

## 2025-01-14 NOTE — PATIENT COMMUNICATION
Replied to pt via [employer] requesting more information regarding concerns, awaiting pt response.

## 2025-01-15 NOTE — PATIENT COMMUNICATION
Noted pt response.    Replied to pt regarding recommendations.  Awaiting for BP log to be received.

## 2025-01-20 ENCOUNTER — OFFICE VISIT (OUTPATIENT)
Dept: CARDIOTHORACIC SURGERY | Facility: MEDICAL CENTER | Age: 70
End: 2025-01-20
Payer: MEDICARE

## 2025-01-20 VITALS
HEIGHT: 72 IN | BODY MASS INDEX: 26.14 KG/M2 | SYSTOLIC BLOOD PRESSURE: 140 MMHG | OXYGEN SATURATION: 98 % | TEMPERATURE: 98.5 F | DIASTOLIC BLOOD PRESSURE: 76 MMHG | WEIGHT: 193 LBS | HEART RATE: 81 BPM

## 2025-01-20 DIAGNOSIS — Z98.890 POST-OPERATIVE STATE: ICD-10-CM

## 2025-01-20 PROCEDURE — 99024 POSTOP FOLLOW-UP VISIT: CPT | Performed by: NURSE PRACTITIONER

## 2025-01-20 PROCEDURE — 3077F SYST BP >= 140 MM HG: CPT | Performed by: NURSE PRACTITIONER

## 2025-01-20 PROCEDURE — 3078F DIAST BP <80 MM HG: CPT | Performed by: NURSE PRACTITIONER

## 2025-01-20 ASSESSMENT — FIBROSIS 4 INDEX: FIB4 SCORE: 1.76

## 2025-01-22 ENCOUNTER — OFFICE VISIT (OUTPATIENT)
Dept: CARDIOLOGY | Facility: MEDICAL CENTER | Age: 70
End: 2025-01-22
Attending: INTERNAL MEDICINE
Payer: MEDICARE

## 2025-01-22 VITALS
DIASTOLIC BLOOD PRESSURE: 75 MMHG | HEIGHT: 72 IN | WEIGHT: 190.8 LBS | SYSTOLIC BLOOD PRESSURE: 128 MMHG | BODY MASS INDEX: 25.84 KG/M2 | HEART RATE: 74 BPM | OXYGEN SATURATION: 96 % | RESPIRATION RATE: 14 BRPM

## 2025-01-22 DIAGNOSIS — I48.0 PAROXYSMAL ATRIAL FIBRILLATION (HCC): ICD-10-CM

## 2025-01-22 DIAGNOSIS — I97.89 POSTOPERATIVE ATRIAL FIBRILLATION (HCC): Chronic | ICD-10-CM

## 2025-01-22 DIAGNOSIS — I48.91 POSTOPERATIVE ATRIAL FIBRILLATION (HCC): Chronic | ICD-10-CM

## 2025-01-22 DIAGNOSIS — Z95.2 S/P AVR (AORTIC VALVE REPLACEMENT): ICD-10-CM

## 2025-01-22 DIAGNOSIS — I10 ESSENTIAL HYPERTENSION: ICD-10-CM

## 2025-01-22 DIAGNOSIS — Z95.2 STATUS POST AORTIC VALVE REPLACEMENT: Chronic | ICD-10-CM

## 2025-01-22 PROCEDURE — 99214 OFFICE O/P EST MOD 30 MIN: CPT | Performed by: INTERNAL MEDICINE

## 2025-01-22 PROCEDURE — 3074F SYST BP LT 130 MM HG: CPT | Performed by: INTERNAL MEDICINE

## 2025-01-22 PROCEDURE — G2211 COMPLEX E/M VISIT ADD ON: HCPCS | Performed by: INTERNAL MEDICINE

## 2025-01-22 PROCEDURE — 3078F DIAST BP <80 MM HG: CPT | Performed by: INTERNAL MEDICINE

## 2025-01-22 RX ORDER — LISINOPRIL 10 MG/1
10 TABLET ORAL DAILY
Qty: 90 TABLET | Refills: 3 | Status: SHIPPED | OUTPATIENT
Start: 2025-01-22

## 2025-01-22 ASSESSMENT — FIBROSIS 4 INDEX: FIB4 SCORE: 1.76

## 2025-01-22 NOTE — PROGRESS NOTES
Chief Complaint   Patient presents with    Hypertension     F/V DX:Essential hypertension    Atrial Fibrillation     F/V DX    Follow-Up     F/V DX:Pure hypercholesterolemia       Subjective     Michael Epperson is a 69 y.o. male who presents today with AS/AI  and TAA s/p replacement in 12/2025    Doing well post surgery already up to exercising an hour at time    Past Medical History:   Diagnosis Date    Bicuspid aortic valve 10/14/2020    Essential hypertension 10/14/2020    Pure hypercholesterolemia 10/14/2020    Severe aortic stenosis     Status post aortic valve replacement - Bio-Bentall, 27 mm Konect Rogel valved conduit for severe AS of Bicuspid AoV and TAA 12/20/2024 12/20/2024     Past Surgical History:   Procedure Laterality Date    AORTIC VALVE REPLACEMENT  12/20/2024    Procedure: AORTIC VALVE REPLACEMENT, ASCENDING AORTIC ANEURYSM REPAIR, AORTIC ROOT REPLACEMENT;  Surgeon: Harris Mina M.D.;  Location: SURGERY [facility];  Service: Cardiothoracic    AORTIC ASCENDING DISSECTION  12/20/2024    Procedure: REPAIR, ANEURYSM, AORTA, ASCENDING;  Surgeon: Harris Mina M.D.;  Location: SURGERY [facility];  Service: Cardiothoracic    ECHOCARDIOGRAM, TRANSESOPHAGEAL, INTRAOPERATIVE  12/20/2024    Procedure: ECHOCARDIOGRAM, TRANSESOPHAGEAL, INTRAOPERATIVE;  Surgeon: Harris Mina M.D.;  Location: SURGERY [facility];  Service: Cardiothoracic    OTHER  11/2024    angiogram    COLONOSCOPY  02/2024     Family History   Problem Relation Age of Onset    Heart Disease Mother     Heart Attack Mother 88    Heart Disease Father 65        CABG     Social History     Socioeconomic History    Marital status:      Spouse name: Not on file    Number of children: Not on file    Years of education: Not on file    Highest education level: Master's degree (e.g., MA, MS, Lan, MEd, MSW, JAVIER)   Occupational History    Not on file   Tobacco Use    Smoking status: Never    Smokeless tobacco: Never   Vaping Use     Vaping status: Never Used   Substance and Sexual Activity    Alcohol use: Not Currently     Alcohol/week: 1.2 oz     Types: 2 Glasses of wine per week    Drug use: Not Currently    Sexual activity: Yes     Partners: Female     Birth control/protection: Male Sterilization   Other Topics Concern    Not on file   Social History Narrative    Not on file     Social Drivers of Health     Financial Resource Strain: Low Risk  (4/19/2021)    Overall Financial Resource Strain (CARDIA)     Difficulty of Paying Living Expenses: Not hard at all   Food Insecurity: No Food Insecurity (12/25/2024)    Hunger Vital Sign     Worried About Running Out of Food in the Last Year: Never true     Ran Out of Food in the Last Year: Never true   Transportation Needs: No Transportation Needs (12/25/2024)    PRAPARE - Transportation     Lack of Transportation (Medical): No     Lack of Transportation (Non-Medical): No   Physical Activity: Sufficiently Active (4/21/2023)    Exercise Vital Sign     Days of Exercise per Week: 5 days     Minutes of Exercise per Session: 90 min   Stress: No Stress Concern Present (4/21/2023)    [employer] [place] [employer] - Occupational Stress Questionnaire     Feeling of Stress : Not at all   Social Connections: Unknown (4/21/2023)    Social Connection and Isolation Panel [NHANES]     Frequency of Communication with Friends and Family: Three times a week     Frequency of Social Gatherings with Friends and Family: Three times a week     Attends [employer] Services: Patient declined     Active Member of Clubs or Organizations: Yes     Attends Club or Organization Meetings: More than 4 times per year     Marital Status:    Intimate Partner Violence: Not At Risk (12/21/2024)    Humiliation, Afraid, Rape, and Kick questionnaire     Fear of Current or Ex-Partner: No     Emotionally Abused: No     Physically Abused: No     Sexually Abused: No   Housing Stability: Low Risk  (12/25/2024)    Housing  Stability Vital Sign     Unable to Pay for Housing in the Last Year: No     Number of Times Moved in the Last Year: 0     Homeless in the Last Year: No     No Known Allergies  Outpatient Encounter Medications as of 1/22/2025   Medication Sig Dispense Refill    apixaban (ELIQUIS) 5mg Tab Take 1 Tablet by mouth 2 times a day. 120 Tablet 0    lisinopril (PRINIVIL) 10 MG Tab Take 1 Tablet by mouth every day. 90 Tablet 3    acetaminophen (TYLENOL) 500 MG Tab Take 2 Tablets by mouth every 6 hours as needed for Moderate Pain or Mild Pain.      amiodarone (CORDARONE) 200 MG Tab Take 2 Tablets by mouth 2 times a day for 7 days, THEN 2 Tablets every day for 7 days, THEN 1 Tablet every day for 30 days. 56 Tablet 0    rosuvastatin (CRESTOR) 10 MG Tab Take 1 Tablet by mouth every evening. (Patient taking differently: Take 10 mg by mouth every morning.) 90 Tablet 3    amoxicillin (AMOXIL) 500 MG Cap       ASPIRIN 81 PO Take 81 mg by mouth. (Patient taking differently: Take 81 mg by mouth every morning.)      Cholecalciferol (VITAMIN D3 PO) Take  by mouth every day.      [DISCONTINUED] furosemide (LASIX) 20 MG Tab Take 1 Tablet by mouth every day. (Patient not taking: Reported on 1/22/2025) 30 Tablet 0    [DISCONTINUED] potassium chloride SA (K-DUR) 10 MEQ Tab CR Take 1 Tablet by mouth every day. (Patient not taking: Reported on 1/22/2025) 30 Tablet 0    [DISCONTINUED] lisinopril (PRINIVIL) 10 MG Tab Take 1 Tablet by mouth every day. 30 Tablet 2    [DISCONTINUED] apixaban (ELIQUIS) 5mg Tab Take 1 Tablet by mouth 2 times a day. 60 Tablet 2     No facility-administered encounter medications on file as of 1/22/2025.     ROS           Objective     /75   Pulse 74   Resp 14   Ht 1.829 m (6')   Wt 86.5 kg (190 lb 12.8 oz)   SpO2 96%   BMI 25.88 kg/m²     Physical Exam  Constitutional:       General: He is not in acute distress.     Appearance: He is not diaphoretic.   Eyes:      General: No scleral icterus.  Neck:       Vascular: No JVD.   Cardiovascular:      Rate and Rhythm: Normal rate.      Heart sounds: Normal heart sounds. No murmur heard.     No friction rub. No gallop.   Pulmonary:      Effort: No respiratory distress.      Breath sounds: No wheezing or rales.   Abdominal:      General: Bowel sounds are normal.      Palpations: Abdomen is soft.   Musculoskeletal:      Right lower leg: No edema.      Left lower leg: No edema.   Skin:     Findings: No rash.   Neurological:      Mental Status: He is alert. Mental status is at baseline.   Psychiatric:         Mood and Affect: Mood normal.            Hospital records reviewed  PEDRO images reviewed        Assessment & Plan     1. Status post aortic valve replacement - Bio-Bentall, 27 mm Konect Rogel valved conduit for severe AS of Bicuspid AoV and TAA 12/20/2024        2. Essential hypertension        3. S/P AVR (aortic valve replacement)  apixaban (ELIQUIS) 5mg Tab    lisinopril (PRINIVIL) 10 MG Tab      4. Paroxysmal atrial fibrillation (HCC)  apixaban (ELIQUIS) 5mg Tab          Medical Decision Making: Today's Assessment/Status/Plan:      It was my pleasure to meet with Mr. Epperson.    We addressed the management of hypertension at today's visit. Blood pressure is well controlled.  We specifically assessed the labs on hypertension treatment    We addressed the management of dyslipidemia at today's visit. He is on appropriate lipid lowering medication.      We addressed the management of aortic valve replacement at today's visit. He understands the importance of antiplatelet therapy as well as dental prophylaxis for the health of their aortic valve replacement.  We have also ensured that he has appropriate echocardiogram follow-up.    ECHO FOR NEW BASELINE      I will see Mr. Epperson back in 9 months time and encouraged him to follow up with us over the phone or electronically using my MyChart as issues arise.    It is my pleasure to participate in the care of Mr. Epperson.   Please do not hesitate to contact me with questions or concerns.    Shaggy Roberts MD PhD [facility]  Cardiologist [facility]    Please note that this dictation was created using voice recognition software. There may be errors I did not discover before finalizing the note.     () Today's E/M visit is associated with medical care services that serve as the continuing focal point for all needed health care services and/or with medical care services that  are part of ongoing care related to a patient's single, serious condition, or a complex condition: This includes  furnishing services to patients on an ongoing basis that result in care that is personalized  to the patient. The services result in a comprehensive, longitudinal, and continuous  relationship with the patient and involve delivery of team-based care that is accessible, coordinated with other practitioners and providers, and integrated with the broader health  care landscape.

## 2025-01-22 NOTE — PATIENT INSTRUCTIONS
We addressed the management of aortic valve replacement at today's visit. He understands the importance of antiplatelet therapy as well as dental prophylaxis for the health of their aortic valve replacement.  We have also ensured that he has appropriate echocardiogram follow-up.    A - Antiplatelet - Clopidogrel (PLAVIX) reduces your risk of cardiac event by 27% compared to Aspirin 81 mg daily (HOST EXAM study 2021), prasrugrel (EFFIENT), ticagrelor (BRILINTA)) may be used for the first year.  Aspirin 81 mg daily is associated with a 20% less use of heart event and is used the first year after a cardiac event, stent or CABG.  B - Blood Pressure Control - reduces your risk or heart attack and stroke, the goal is <130/80.  C - Cholesterol Management - statins dramatically reduce your risk; for those that are intolerant to statins, there are alternatives.  D - Diet - MEDITERRANEAN DIET or Cardiac rehab diets, [email or web address].  E - Exercise - at least 2.5 hours of moderate exercise weekly  (typical brisk walking or similar activity).  F - Fats - VASCEPA, or EPA Fish oil (if Vascepa too expensive) for elevated triglycerides (REDUCE IT trial showed reduction from 22% 5 year MACE to 17%).  G - Good Vibes - meditation, exercise, yoga, Pilates, mindfulness, Jarrod-Chi, stress reduction.  H - Heart Failure - betablockers, sucubatril (ENTRESTO) 16% less risk of dying over 3 years, spironolactone, empagliflozin (JARDIANCE) 17% less risk of dying over 2 years, CRT +/- ICD.  I - Inflammation - Colchicine in the LoDoCo2 study in 2020 reduced the risk of heart attack by 30% in 2.5 year follow up.  R - Rehab - Cardiac Rehab reduces risk of dying by 13-24% and need to go to the hospital by 30% within the first year. Compared to regular Cardiac Rehab, Intensive Cardiac Rehab ([facility]) was shown to reduce the risk of major events 17% to 11% and hospitalization for CHF from 8% to 2%. (Nutrients [id number](79):9178)  S - Smoking -  never smoke, if you do smoke ask for help to quit for good. Patients who quit smoking after heart attack have 36% less likely risk of dying.  Resources are 1-800-QUIT-NOW Connectivity in addition to Chantix, bupropion (Zyban) or nicotine replacement  T - Type II Diabetes - pills empagliflozin (JARDIANCE) 38% less risk of dying over 4 years, and/or weekly injections: tirzepatide (Mounjaro), semaglutide (Ozempic), liraglutide (Victoza), dulaglutide (Trulicity) ~26% less risk of MACE in 2 years.  V - Vaccines - Annual flu shot and COVID vaccine reduces the risk of serious cardiovascular complications from these deadly infections.  W - Weight - maintain a healthy weight. Semaglutide (WEGOVY) weekly injection was shown to reduce weight by 10% and heart events by 20% for patients with CAD and BMI > 27 in the SELECT trial (6.5% vs 8% in 48 month follow up [facility] 12/2023).      Work on at least 2.5 - 5 hours a week of moderate exercise    Please look into the following diets and incorporate them into your diet  LOW SALT DIET   KEEP YOUR SODIUM EQUAL TO CALORIES AND NO MORE THAN DOUBLE THE CALORIES FOR A LOW SALT DIET    Cardiosmart.org - great resource for American College of Cardiology on heart disease prevention and treatment    FOR TREATMENT OR PREVENTION OF CORONARY ARTERY DISEASE  These three programs are approved by Medicare/Insurers for those with heart disease  [place] [facility]  Dr. Levine's Program for Reversing Heart Disease - Alex Moraes's Cardiologist vegetarian-based  [facility] - [place]-based mind-body Program    Mediterranean Diet has been shown to be a hearty healthy diet.    This is a commonly referenced Program  Dr Creda - Meghann over [place] (book and documentary) - vegetarian-based    FOR TREATMENT OF BLOOD PRESSURE  DASH DIET - American Heart Association for treatment of HYPERTENSION    FOR TREATMENT OF BAD  CHOLESTEROL/FATS  REDUCE PROCESSED SUGAR AS MUCH AS POSSIBLE  INCREASE WHOLE GRAINS/VEGETABLES  INCREASE FIBER    Lowering total cholesterol and LDL (bad) cholesterol:  - Eat leaner cuts of meat, or eliminate altogether if possible red meat, and frequently substitute fish or chicken.  - Limit saturated fat to no more than 7-10% of total calories no more than 10 g per day is recommended. Some sources of saturated fat include butter, animal fats, hydrogenated vegetable fats and oils, many desserts, whole milk dairy products.  - Replaced saturated fats with polyunsaturated fats and monounsaturated fats. Foods high in monounsaturated fat include nuts, canola oil, avocados, and olives.  - Limit trans fat (processed foods) and replaced with fresh fruits and vegetables  - Recommend nonfat dairy products  - Increase substantially the amount of soluble fiber intake (legumes such as beans, fruit, whole grains).  - Consider nutritional supplements: plant sterile spreads such as Benecol, fish oil,  flaxseed oil, omega-3 acids EPA capsules 2000 mg twice a day, or viscous fiber such as Metamucil  - Attain ideal weight and regular exercise (at least 30 minutes per day of moderate exercise)  ASK ABOUT STATIN OR NON STATIN MEDICATION TO REDUCE YOUR LDL AND HEART RISK    Lowering triglycerides:  - Reduce intake of simple sugar: Desserts, candy, pastries, honey, sodas, sugared cereals, yogurt, Gatorade, sports bars, canned fruit, smoothies, fruit juice, coffee drinks  - Reduced intake of refined starches: Refined Pasta, most bread  - Reduce or abstain from alcohol  - Increase omega-3 fatty acids: [place], Trout, Mackerel, Herring, [place] tuna and supplements  - Attain ideal weight and regular exercise (at least 30 minutes per day of moderate exercise)  ASK ABOUT PURIFIED OMEGA 3 EPA or FISH OIL TO REDUCE YOUR TG AND HEART RISK    Elevating HDL (good) cholesterol:  - Increase physical activity  - Increase omega-3 fatty acids and  supplements as listed above  - Incorporating appropriate amounts of monounsaturated fats such as nuts, olive oil, canola oil, avocados, olives  - Stop smoking  - Attain ideal weight and regular exercise (at least 30 minutes per day of moderate exercise)

## 2025-01-24 ENCOUNTER — PATIENT MESSAGE (OUTPATIENT)
Dept: CARDIOLOGY | Facility: MEDICAL CENTER | Age: 70
End: 2025-01-24
Payer: MEDICARE

## 2025-01-24 DIAGNOSIS — I48.0 PAROXYSMAL ATRIAL FIBRILLATION (HCC): ICD-10-CM

## 2025-01-24 DIAGNOSIS — Z95.2 S/P AVR (AORTIC VALVE REPLACEMENT): ICD-10-CM

## 2025-01-25 ENCOUNTER — PATIENT MESSAGE (OUTPATIENT)
Dept: CARDIOLOGY | Facility: MEDICAL CENTER | Age: 70
End: 2025-01-25
Payer: MEDICARE

## 2025-01-28 ENCOUNTER — PATIENT MESSAGE (OUTPATIENT)
Dept: CARDIOLOGY | Facility: MEDICAL CENTER | Age: 70
End: 2025-01-28
Payer: MEDICARE

## 2025-01-29 ENCOUNTER — PATIENT MESSAGE (OUTPATIENT)
Dept: CARDIOLOGY | Facility: MEDICAL CENTER | Age: 70
End: 2025-01-29
Payer: MEDICARE

## 2025-02-24 ENCOUNTER — PATIENT MESSAGE (OUTPATIENT)
Dept: CARDIOLOGY | Facility: MEDICAL CENTER | Age: 70
End: 2025-02-24
Payer: MEDICARE

## 2025-02-24 DIAGNOSIS — I97.89 POSTOPERATIVE ATRIAL FIBRILLATION (HCC): ICD-10-CM

## 2025-02-24 DIAGNOSIS — I48.0 PAROXYSMAL ATRIAL FIBRILLATION (HCC): ICD-10-CM

## 2025-02-24 DIAGNOSIS — I48.91 POSTOPERATIVE ATRIAL FIBRILLATION (HCC): ICD-10-CM

## 2025-02-25 ENCOUNTER — PATIENT MESSAGE (OUTPATIENT)
Dept: CARDIOLOGY | Facility: MEDICAL CENTER | Age: 70
End: 2025-02-25
Payer: MEDICARE

## 2025-02-26 NOTE — PATIENT COMMUNICATION
CW - Please advise on Eliquis. Pt is stating he was suppose to stop Eliquis after 60 days or last refill.    Do you want this patient still on Eliquis, nothing is noted in your OV note. Thank you!

## 2025-03-19 ENCOUNTER — HOSPITAL ENCOUNTER (OUTPATIENT)
Dept: CARDIOLOGY | Facility: MEDICAL CENTER | Age: 70
End: 2025-03-19
Attending: INTERNAL MEDICINE
Payer: MEDICARE

## 2025-03-19 LAB — EKG IMPRESSION: NORMAL

## 2025-03-19 PROCEDURE — 93005 ELECTROCARDIOGRAM TRACING: CPT | Mod: TC | Performed by: INTERNAL MEDICINE

## 2025-03-20 ENCOUNTER — PATIENT MESSAGE (OUTPATIENT)
Dept: CARDIOLOGY | Facility: MEDICAL CENTER | Age: 70
End: 2025-03-20
Payer: MEDICARE

## 2025-03-22 ENCOUNTER — RESULTS FOLLOW-UP (OUTPATIENT)
Dept: CARDIOLOGY | Facility: MEDICAL CENTER | Age: 70
End: 2025-03-22
Payer: MEDICARE

## 2025-03-25 ENCOUNTER — PATIENT MESSAGE (OUTPATIENT)
Dept: CARDIOLOGY | Facility: MEDICAL CENTER | Age: 70
End: 2025-03-25
Payer: MEDICARE

## 2025-03-26 NOTE — PATIENT COMMUNICATION
To CW: Please advise, see [email or web address] message. Pt reports high BP. Pt is currently taking lisinopril 10mg. Thank you.   
-continue atorvastatin

## 2025-04-01 ENCOUNTER — PATIENT MESSAGE (OUTPATIENT)
Dept: CARDIOLOGY | Facility: MEDICAL CENTER | Age: 70
End: 2025-04-01
Payer: MEDICARE

## 2025-04-19 ENCOUNTER — PATIENT MESSAGE (OUTPATIENT)
Dept: CARDIOLOGY | Facility: MEDICAL CENTER | Age: 70
End: 2025-04-19
Payer: MEDICARE

## 2025-04-24 ENCOUNTER — PATIENT MESSAGE (OUTPATIENT)
Dept: CARDIOLOGY | Facility: MEDICAL CENTER | Age: 70
End: 2025-04-24
Payer: MEDICARE

## 2025-05-01 ENCOUNTER — OFFICE VISIT (OUTPATIENT)
Dept: CARDIOLOGY | Facility: MEDICAL CENTER | Age: 70
End: 2025-05-01
Attending: INTERNAL MEDICINE
Payer: MEDICARE

## 2025-05-01 VITALS
DIASTOLIC BLOOD PRESSURE: 78 MMHG | BODY MASS INDEX: 26.55 KG/M2 | WEIGHT: 196 LBS | RESPIRATION RATE: 16 BRPM | SYSTOLIC BLOOD PRESSURE: 120 MMHG | HEIGHT: 72 IN | OXYGEN SATURATION: 99 % | HEART RATE: 85 BPM

## 2025-05-01 DIAGNOSIS — I10 ESSENTIAL HYPERTENSION: ICD-10-CM

## 2025-05-01 DIAGNOSIS — Z95.2 S/P AVR (AORTIC VALVE REPLACEMENT): ICD-10-CM

## 2025-05-01 DIAGNOSIS — Z95.2 STATUS POST AORTIC VALVE REPLACEMENT: Chronic | ICD-10-CM

## 2025-05-01 DIAGNOSIS — I71.21 ANEURYSM OF ASCENDING AORTA WITHOUT RUPTURE (HCC): ICD-10-CM

## 2025-05-01 PROCEDURE — 3078F DIAST BP <80 MM HG: CPT | Performed by: INTERNAL MEDICINE

## 2025-05-01 PROCEDURE — 99214 OFFICE O/P EST MOD 30 MIN: CPT | Performed by: INTERNAL MEDICINE

## 2025-05-01 PROCEDURE — 99212 OFFICE O/P EST SF 10 MIN: CPT | Performed by: INTERNAL MEDICINE

## 2025-05-01 PROCEDURE — 3074F SYST BP LT 130 MM HG: CPT | Performed by: INTERNAL MEDICINE

## 2025-05-01 RX ORDER — LISINOPRIL 20 MG/1
20 TABLET ORAL DAILY
Qty: 90 TABLET | Refills: 3 | Status: SHIPPED | OUTPATIENT
Start: 2025-05-01

## 2025-05-01 ASSESSMENT — FIBROSIS 4 INDEX: FIB4 SCORE: 1.79

## 2025-05-01 NOTE — PROGRESS NOTES
Chief Complaint   Patient presents with    Aortic Stenosis    Atrial Fibrillation     F/v dx: Postoperative atrial fibrillation (HCC)       Subjective     Michael Epperson is a 70 y.o. male who presents today with AS/AI and TAA s/p replacement in 12/2025     Doing okay had more forceful heat beats    Playing golf at [employer]    Past Medical History:   Diagnosis Date    Bicuspid aortic valve 10/14/2020    Essential hypertension 10/14/2020    Pure hypercholesterolemia 10/14/2020    Severe aortic stenosis     Status post aortic valve replacement - Bio-Bentall, 27 mm Konect Rogel valved conduit for severe AS of Bicuspid AoV and TAA 12/20/2024 12/20/2024     Past Surgical History:   Procedure Laterality Date    AORTIC VALVE REPLACEMENT  12/20/2024    Procedure: AORTIC VALVE REPLACEMENT, ASCENDING AORTIC ANEURYSM REPAIR, AORTIC ROOT REPLACEMENT;  Surgeon: Harris Mina M.D.;  Location: SURGERY [facility];  Service: Cardiothoracic    AORTIC ASCENDING DISSECTION  12/20/2024    Procedure: REPAIR, ANEURYSM, AORTA, ASCENDING;  Surgeon: Harris Mina M.D.;  Location: SURGERY [facility];  Service: Cardiothoracic    ECHOCARDIOGRAM, TRANSESOPHAGEAL, INTRAOPERATIVE  12/20/2024    Procedure: ECHOCARDIOGRAM, TRANSESOPHAGEAL, INTRAOPERATIVE;  Surgeon: Harris Mina M.D.;  Location: SURGERY [facility];  Service: Cardiothoracic    OTHER  11/2024    angiogram    COLONOSCOPY  02/2024     Family History   Problem Relation Age of Onset    Heart Disease Mother     Heart Attack Mother 88    Heart Disease Father 65        CABG     Social History     Socioeconomic History    Marital status:      Spouse name: Not on file    Number of children: Not on file    Years of education: Not on file    Highest education level: Master's degree (e.g., MA, MS, Lan, MEd, MSW, JAVIER)   Occupational History    Not on file   Tobacco Use    Smoking status: Never    Smokeless tobacco: Never   Vaping Use    Vaping status: Never Used   Substance  and Sexual Activity    Alcohol use: Not Currently     Alcohol/week: 1.2 oz     Types: 2 Glasses of wine per week    Drug use: Not Currently    Sexual activity: Yes     Partners: Female     Birth control/protection: Male Sterilization   Other Topics Concern    Not on file   Social History Narrative    Not on file     Social Drivers of Health     Financial Resource Strain: Low Risk  (4/19/2021)    Overall Financial Resource Strain (CARDIA)     Difficulty of Paying Living Expenses: Not hard at all   Food Insecurity: No Food Insecurity (12/25/2024)    Hunger Vital Sign     Worried About Running Out of Food in the Last Year: Never true     Ran Out of Food in the Last Year: Never true   Transportation Needs: No Transportation Needs (12/25/2024)    PRAPARE - Transportation     Lack of Transportation (Medical): No     Lack of Transportation (Non-Medical): No   Physical Activity: Sufficiently Active (4/21/2023)    Exercise Vital Sign     Days of Exercise per Week: 5 days     Minutes of Exercise per Session: 90 min   Stress: No Stress Concern Present (4/21/2023)    [employer] - Occupational Stress Questionnaire     Feeling of Stress : Not at all   Social Connections: Unknown (4/21/2023)    Social Connection and Isolation Panel [NHANES]     Frequency of Communication with Friends and Family: Three times a week     Frequency of Social Gatherings with Friends and Family: Three times a week     Attends Zoroastrianism Services: Patient declined     Active Member of Clubs or Organizations: Yes     Attends Club or Organization Meetings: More than 4 times per year     Marital Status:    Intimate Partner Violence: Not At Risk (12/21/2024)    Humiliation, Afraid, Rape, and Kick questionnaire     Fear of Current or Ex-Partner: No     Emotionally Abused: No     Physically Abused: No     Sexually Abused: No   Housing Stability: Low Risk  (12/25/2024)    Housing Stability Vital Sign     Unable to Pay for  Housing in the Last Year: No     Number of Times Moved in the Last Year: 0     Homeless in the Last Year: No     No Known Allergies  Outpatient Encounter Medications as of 5/1/2025   Medication Sig Dispense Refill    lisinopril (PRINIVIL) 20 MG Tab Take 1 Tablet by mouth every day. 90 Tablet 3    rosuvastatin (CRESTOR) 10 MG Tab Take 1 Tablet by mouth every evening. (Patient taking differently: Take 10 mg by mouth every morning.) 90 Tablet 3    amoxicillin (AMOXIL) 500 MG Cap       ASPIRIN 81 PO Take 81 mg by mouth. (Patient taking differently: Take 81 mg by mouth every morning.)      Cholecalciferol (VITAMIN D3 PO) Take  by mouth every day.      [DISCONTINUED] lisinopril (PRINIVIL) 10 MG Tab Take 1 Tablet by mouth every day. (Patient taking differently: Take 20 mg by mouth every day.) 90 Tablet 3    [DISCONTINUED] acetaminophen (TYLENOL) 500 MG Tab Take 2 Tablets by mouth every 6 hours as needed for Moderate Pain or Mild Pain.       No facility-administered encounter medications on file as of 5/1/2025.     ROS           Objective     /78 (BP Location: Left arm, Patient Position: Sitting, BP Cuff Size: Adult)   Pulse 85   Resp 16   Ht 1.829 m (6')   Wt 88.9 kg (196 lb)   SpO2 99%   BMI 26.58 kg/m²     Physical Exam  Constitutional:       General: He is not in acute distress.     Appearance: He is not diaphoretic.   Eyes:      General: No scleral icterus.  Neck:      Vascular: No JVD.   Cardiovascular:      Rate and Rhythm: Normal rate.      Heart sounds: Normal heart sounds. No murmur heard.     No friction rub. No gallop.   Pulmonary:      Effort: No respiratory distress.      Breath sounds: No wheezing or rales.   Abdominal:      General: Bowel sounds are normal.      Palpations: Abdomen is soft.   Musculoskeletal:      Right lower leg: No edema.      Left lower leg: No edema.   Skin:     Findings: No rash.   Neurological:      Mental Status: He is alert. Mental status is at baseline.   Psychiatric:          Mood and Affect: Mood normal.          We reviewed in person the most recent labs  Recent Results (from the past 30 weeks)   MICROALBUMIN CREAT RATIO URINE    Collection Time: 10/24/24 11:40 AM   Result Value Ref Range    Creatinine, Urine 193.51 mg/dL    Microalbumin, Urine Random <1.2 mg/dL    Micro Alb Creat Ratio see below 0 - 30 mg/g   EC-ECHOCARDIOGRAM COMPLETE W/O CONT    Collection Time: 10/28/24  9:47 AM   Result Value Ref Range    Eject.Frac. MOD BP 56.03     Eject.Frac. MOD 4C 57.7     Eject.Frac. MOD 2C 53.1     Left Ventrical Ejection Fraction 60    EKG    Collection Time: 24  8:13 AM   Result Value Ref Range    Report       [facility]    Test Date:  2024  Pt Name:    FIFI WILKINSON              Department: [facility]  MRN:        [id number]                      Room:  Gender:     Male                         Technician: BETSY  :        1955                   Requested By:KRIS WHITTEN  Order #:    [id number]                    Reading MD: Kris Whitten MD    Measurements  Intervals                                Axis  Rate:       58                           P:          39  AZ:         183                          QRS:        -12  QRSD:       108                          T:          -32  QT:         452  QTc:        445    Interpretive Statements  Sinus bradycardia  Left ventricular hypertrophy  Anterior Q waves, possibly due to LVH  Borderline T abnormalities, inferior leads  Compared to ECG 10/23/2020 08:20:25  NO SIGNIFICANT CHANGES  Electronically Signed On 2024 19:52:05 PST by Kris Whitten MD     CBC WITHOUT DIFFERENTIAL    Collection Time: 24  8:16 AM   Result Value Ref Range    WBC 7.2 4.8 - 10.8 K/uL    RBC 5.15 4.70 - 6.10 M/uL    Hemoglobin 15.6 14.0 - 18.0 g/dL    Hematocrit 46.4 42.0 - 52.0 %    MCV 90.1 81.4 - 97.8 fL    MCH 30.3 27.0 - 33.0 pg    MCHC 33.6 32.3 - 36.5 g/dL    RDW 40.0 35.9 - 50.0 fL    Platelet Count 203 164 - 446  K/uL    MPV 12.2 9.0 - 12.9 fL   Comp Metabolic Panel    Collection Time: 11/18/24  8:16 AM   Result Value Ref Range    Sodium 140 135 - 145 mmol/L    Potassium 4.4 3.6 - 5.5 mmol/L    Chloride 107 96 - 112 mmol/L    Co2 24 20 - 33 mmol/L    Anion Gap 9.0 7.0 - 16.0    Glucose 113 (H) 65 - 99 mg/dL    Bun 17 8 - 22 mg/dL    Creatinine 0.98 0.50 - 1.40 mg/dL    Calcium 9.6 8.5 - 10.5 mg/dL    Correct Calcium 9.4 8.5 - 10.5 mg/dL    AST(SGOT) 26 12 - 45 U/L    ALT(SGPT) 20 2 - 50 U/L    Alkaline Phosphatase 50 30 - 99 U/L    Total Bilirubin 0.5 0.1 - 1.5 mg/dL    Albumin 4.3 3.2 - 4.9 g/dL    Total Protein 6.7 6.0 - 8.2 g/dL    Globulin 2.4 1.9 - 3.5 g/dL    A-G Ratio 1.8 g/dL   PT    Collection Time: 11/18/24  8:16 AM   Result Value Ref Range    PT 13.9 12.0 - 14.6 sec    INR 1.07 0.87 - 1.13   APTT    Collection Time: 11/18/24  8:16 AM   Result Value Ref Range    APTT 25.3 24.7 - 36.0 sec   ESTIMATED GFR    Collection Time: 11/18/24  8:16 AM   Result Value Ref Range    GFR (CKD-EPI) 84 >60 mL/min/1.73 m 2   PreAdmit COD    Collection Time: 12/18/24  8:12 AM   Result Value Ref Range    ABO Grouping Only B     Rh Grouping Only NEG     Antibody Screen Scrn NEG    COMPONENT CELLULAR    Collection Time: 12/18/24  8:12 AM   Result Value Ref Range    Component R       R99                 Red Cells, LR       [id number]   transfused   12/20/24   15:28      Product Type R99     Dispense Status transfused     Unit Number (Barcoded) [id number]     Product Code (Barcoded) T6759F31     Blood Type (Barcoded) 1700     Component R       R99                 Red Cells, LR       [id number]   transfused   12/20/24   18:36      Product Type R99     Dispense Status transfused     Unit Number (Barcoded) [id number]     Product Code (Barcoded) X2875S78     Blood Type (Barcoded) 1700     Component R       R99                 Red Cells, LR       [id number]   transfused   12/22/24   01:33      Product Type R99     Dispense Status  transfused     Unit Number (Barcoded) [id number]     Product Code (Barcoded) Z0139J70     Blood Type (Barcoded) 1700     Component R       R7                  Red Blood Cells7    [id number]   transfused   12/22/24   03:07      Product Type Red Blood Cells LR Pheresis     Dispense Status transfused     Unit Number (Barcoded) [id number]     Product Code (Barcoded) Z3528M68     Blood Type (Barcoded) 1700     Component R       R99                 Red Cells, LR       [id number]   transfused   12/23/24   02:12      Product Type R99     Dispense Status transfused     Unit Number (Barcoded) [id number]     Product Code (Barcoded) K0780D98     Blood Type (Barcoded) 1700    CBC WITH DIFFERENTIAL    Collection Time: 12/18/24  8:13 AM   Result Value Ref Range    WBC 7.7 4.8 - 10.8 K/uL    RBC 5.29 4.70 - 6.10 M/uL    Hemoglobin 16.1 14.0 - 18.0 g/dL    Hematocrit 46.7 42.0 - 52.0 %    MCV 88.3 81.4 - 97.8 fL    MCH 30.4 27.0 - 33.0 pg    MCHC 34.5 32.3 - 36.5 g/dL    RDW 40.6 35.9 - 50.0 fL    Platelet Count 216 164 - 446 K/uL    MPV 12.3 9.0 - 12.9 fL    Neutrophils-Polys 55.10 44.00 - 72.00 %    Lymphocytes 32.80 22.00 - 41.00 %    Monocytes 9.60 0.00 - 13.40 %    Eosinophils 1.40 0.00 - 6.90 %    Basophils 0.80 0.00 - 1.80 %    Immature Granulocytes 0.30 0.00 - 0.90 %    Nucleated RBC 0.00 0.00 - 0.20 /100 WBC    Neutrophils (Absolute) 4.25 1.82 - 7.42 K/uL    Lymphs (Absolute) 2.53 1.00 - 4.80 K/uL    Monos (Absolute) 0.74 0.00 - 0.85 K/uL    Eos (Absolute) 0.11 0.00 - 0.51 K/uL    Baso (Absolute) 0.06 0.00 - 0.12 K/uL    Immature Granulocytes (abs) 0.02 0.00 - 0.11 K/uL    NRBC (Absolute) 0.00 K/uL   Comp Metabolic Panel    Collection Time: 12/18/24  8:13 AM   Result Value Ref Range    Sodium 139 135 - 145 mmol/L    Potassium 4.3 3.6 - 5.5 mmol/L    Chloride 105 96 - 112 mmol/L    Co2 25 20 - 33 mmol/L    Anion Gap 9.0 7.0 - 16.0    Glucose 98 65 - 99 mg/dL    Bun 17 8 - 22 mg/dL    Creatinine 1.11 0.50 -  1.40 mg/dL    Calcium 9.9 8.5 - 10.5 mg/dL    Correct Calcium 9.6 8.5 - 10.5 mg/dL    AST(SGOT) 36 12 - 45 U/L    ALT(SGPT) 32 2 - 50 U/L    Alkaline Phosphatase 47 30 - 99 U/L    Total Bilirubin 0.7 0.1 - 1.5 mg/dL    Albumin 4.4 3.2 - 4.9 g/dL    Total Protein 7.0 6.0 - 8.2 g/dL    Globulin 2.6 1.9 - 3.5 g/dL    A-G Ratio 1.7 g/dL   URINALYSIS    Collection Time: 12/18/24  8:13 AM    Specimen: Urine   Result Value Ref Range    Color Yellow     Character Clear     Specific Gravity 1.026 <1.035    Ph 6.5 5.0 - 8.0    Glucose Negative Negative mg/dL    Ketones Trace (A) Negative mg/dL    Protein Negative Negative mg/dL    Bilirubin Negative Negative    Urobilinogen, Urine 1.0 <=1.0 EU/dL    Nitrite Negative Negative    Leukocyte Esterase Negative Negative    Occult Blood Negative Negative    Micro Urine Req see below    Urine Drug Screen    Collection Time: 12/18/24  8:13 AM   Result Value Ref Range    Amphetamines Urine Negative Negative    Barbiturates Negative Negative    Benzodiazepines Negative Negative    Cocaine Metabolite Negative Negative    Fentanyl, Urine Negative Negative    Methadone Negative Negative    Opiates Negative Negative    Oxycodone Negative Negative    Phencyclidine -Pcp Negative Negative    Propoxyphene Negative Negative    Cannabinoid Metab Negative Negative   PT    Collection Time: 12/18/24  8:13 AM   Result Value Ref Range    PT 13.9 12.0 - 14.6 sec    INR 1.07 0.87 - 1.13   APTT    Collection Time: 12/18/24  8:13 AM   Result Value Ref Range    APTT 25.5 24.7 - 36.0 sec   HEMOGLOBIN A1C    Collection Time: 12/18/24  8:13 AM   Result Value Ref Range    Glycohemoglobin 5.4 4.0 - 5.6 %    Est Avg Glucose 108 mg/dL   S. Aureus By PCR, Nasal Complete    Collection Time: 12/18/24  8:13 AM    Specimen: Respirate   Result Value Ref Range    Staph aureus by PCR Negative Negative    MRSA by PCR Negative Negative   ESTIMATED GFR    Collection Time: 12/18/24  8:13 AM   Result Value Ref Range    GFR  (CKD-EPI) 72 >60 mL/min/1.73 m 2   EKG    Collection Time: 24  9:59 AM   Result Value Ref Range    Report       [facility]    Test Date:  2024  Pt Name:    FIFI WILKINSON              Department: [facility]  MRN:        [id number]                      Room:  Gender:     M                            Technician: GIRISHJ  :        1955                   Requested By:FALLON BRUCE  Order #:    [id number]                    Reading MD: Irwin Morales MD    Measurements  Intervals                                Axis  Rate:       54                           P:          37  TN:         190                          QRS:        -7  QRSD:       102                          T:          8  QT:         467  QTc:        443    Interpretive Statements  Sinus bradycardia  Left ventricular hypertrophy  Anterior Q waves  Inf ST depressions  Nonspecific ST-T wave changes  Compared to ECG 2024 08:13:16  No significant changes  Electronically Signed On 2024 09:59:56 PST by Irwin Morales MD     PLATELETS REQUEST    Collection Time: 24  6:28 AM   Result Value Ref Range    Component P       P07                 Plts,Pheresis       [id number]   transfused   24   11:36      Product Type Platelets  Pheresis LR     Dispense Status transfused     Unit Number (Barcoded) [id number]     Product Code (Barcoded) H2743I68     Blood Type (Barcoded) 6200     Component P       P07                 Plts,Pheresis       [id number]   transfused   24   14:26      Product Type Platelets  Pheresis LR     Dispense Status transfused     Unit Number (Barcoded) [id number]     Product Code (Barcoded) Z8369T32     Blood Type (Barcoded) 7300    ABO Rh Confirm    Collection Time: 24  6:32 AM   Result Value Ref Range    ABO Rh Confirm B NEG    Histology Request    Collection Time: 24  7:15 AM   Result Value Ref Range    Pathology Request Sent to Histo    POCT glucose device results    Collection  Time: 12/20/24  7:55 AM   Result Value Ref Range    POC Glucose, Blood 106 (H) 65 - 99 mg/dL   POCT arterial blood gas device results    Collection Time: 12/20/24  7:57 AM   Result Value Ref Range    Ph 7.339 (L) 7.350 - 7.450    Pco2 42.5 32.0 - 48.0 mmHg    Po2 206 (H) 83 - 108 mmHg    Tco2 24 (L) 32 - 48 mmol/L    S02 100 (H) 93 - 99 %    Hco3 22.9 21.0 - 28.0 mmol/L    BE -3 -4 - 3 mmol/L    Body Temp 36.0 C degrees    Ph Temp Miguel 7.353 7.350 - 7.450    Pco2 Temp Co 40.6 32.0 - 48.0 mmHg    Po2 Temp Cor 201 (H) 64 - 87 mmHg    Specimen Arterial    POCT sodium device results    Collection Time: 12/20/24  7:57 AM   Result Value Ref Range    Istat Sodium 141 135 - 145 mmol/L   POCT potassium device results    Collection Time: 12/20/24  7:57 AM   Result Value Ref Range    Istat Potassium 3.7 3.6 - 5.5 mmol/L   POCT ionized CA device results    Collection Time: 12/20/24  7:57 AM   Result Value Ref Range    Istat Ionized Calcium 1.26 1.10 - 1.30 mmol/L   POCT hematocrit and hemoglobin device results    Collection Time: 12/20/24  7:57 AM   Result Value Ref Range    Istat Hematocrit 45 42 - 52 %    Istat Hemoglobin 15.3 14.0 - 18.0 g/dL   POCT activated clotting time device results    Collection Time: 12/20/24  7:58 AM   Result Value Ref Range    Istat Activated Clotting Time 118 74 - 137 sec   POCT glucose device results    Collection Time: 12/20/24  8:41 AM   Result Value Ref Range    POC Glucose, Blood 129 (H) 65 - 99 mg/dL   POCT arterial blood gas device results    Collection Time: 12/20/24  8:44 AM   Result Value Ref Range    Ph 7.279 (LL) 7.350 - 7.450    Pco2 49.7 (H) 32.0 - 48.0 mmHg    Po2 146 (H) 83 - 108 mmHg    Tco2 25 (L) 32 - 48 mmol/L    S02 99 93 - 99 %    Hco3 23.3 21.0 - 28.0 mmol/L    BE -4 -4 - 3 mmol/L    Body Temp 36.0 C degrees    Ph Temp Miguel 7.293 (LL) 7.350 - 7.450    Pco2 Temp Co 47.6 32.0 - 48.0 mmHg    Po2 Temp Cor 140 (H) 64 - 87 mmHg    Specimen Arterial    POCT sodium device results     Collection Time: 12/20/24  8:44 AM   Result Value Ref Range    Istat Sodium 138 135 - 145 mmol/L   POCT potassium device results    Collection Time: 12/20/24  8:44 AM   Result Value Ref Range    Istat Potassium 4.2 3.6 - 5.5 mmol/L   POCT ionized CA device results    Collection Time: 12/20/24  8:44 AM   Result Value Ref Range    Istat Ionized Calcium 1.26 1.10 - 1.30 mmol/L   POCT hematocrit and hemoglobin device results    Collection Time: 12/20/24  8:44 AM   Result Value Ref Range    Istat Hematocrit 41 (L) 42 - 52 %    Istat Hemoglobin 13.9 (L) 14.0 - 18.0 g/dL   POCT activated clotting time device results    Collection Time: 12/20/24  8:49 AM   Result Value Ref Range    Istat Activated Clotting Time 516 (H) 74 - 137 sec   POCT glucose device results    Collection Time: 12/20/24  9:14 AM   Result Value Ref Range    POC Glucose, Blood 117 (H) 65 - 99 mg/dL   POCT venous blood gas device results    Collection Time: 12/20/24  9:16 AM   Result Value Ref Range    Ph 7.281 (L) 7.310 - 7.450    Pco2 54.1 (H) 38.0 - 54.0 mmHg    Po2 45 23 - 48 mmHg    Tco2 27 20 - 33 mmol/L    SO2 75 60 - 85 %    Hco3 25.5 22.0 - 29.0 mmol/L    BE -2 -2 - 3 mmol/L    Body Temp 35.5 C degrees    Ph Temp Correc 7.302 (L) 7.310 - 7.450    Pco2 Temp Miguel 50.7 38.0 - 54.0 mmHg    Po2 Temp Corre 41 23 - 48 mmHg    Specimen Venous    POCT sodium device results    Collection Time: 12/20/24  9:16 AM   Result Value Ref Range    Istat Sodium 138 135 - 145 mmol/L   POCT potassium device results    Collection Time: 12/20/24  9:16 AM   Result Value Ref Range    Istat Potassium 4.9 3.6 - 5.5 mmol/L   POCT ionized CA device results    Collection Time: 12/20/24  9:16 AM   Result Value Ref Range    Istat Ionized Calcium 1.01 (L) 1.10 - 1.30 mmol/L   POCT hematocrit and hemoglobin device results    Collection Time: 12/20/24  9:16 AM   Result Value Ref Range    Istat Hematocrit 30 (L) 42 - 52 %    Istat Hemoglobin 10.2 (L) 14.0 - 18.0 g/dL   POCT activated  clotting time device results    Collection Time: 12/20/24  9:21 AM   Result Value Ref Range    Istat Activated Clotting Time 464 (H) 74 - 137 sec   POCT glucose device results    Collection Time: 12/20/24  9:42 AM   Result Value Ref Range    POC Glucose, Blood 164 (H) 65 - 99 mg/dL   POCT arterial blood gas device results    Collection Time: 12/20/24  9:44 AM   Result Value Ref Range    Ph 7.320 (L) 7.350 - 7.450    Pco2 50.0 (H) 32.0 - 48.0 mmHg    Po2 301 (H) 83 - 108 mmHg    Tco2 27 (L) 32 - 48 mmol/L    S02 100 (H) 93 - 99 %    Hco3 25.8 21.0 - 28.0 mmol/L    BE -1 -4 - 3 mmol/L    Body Temp 34.5 C degrees    Ph Temp Miguel 7.355 7.350 - 7.450    Pco2 Temp Co 44.9 32.0 - 48.0 mmHg    Po2 Temp Cor 289 (H) 64 - 87 mmHg    Specimen Arterial    POCT sodium device results    Collection Time: 12/20/24  9:44 AM   Result Value Ref Range    Istat Sodium 136 135 - 145 mmol/L   POCT potassium device results    Collection Time: 12/20/24  9:44 AM   Result Value Ref Range    Istat Potassium 5.5 3.6 - 5.5 mmol/L   POCT ionized CA device results    Collection Time: 12/20/24  9:44 AM   Result Value Ref Range    Istat Ionized Calcium 1.05 (L) 1.10 - 1.30 mmol/L   POCT hematocrit and hemoglobin device results    Collection Time: 12/20/24  9:44 AM   Result Value Ref Range    Istat Hematocrit 34 (L) 42 - 52 %    Istat Hemoglobin 11.6 (L) 14.0 - 18.0 g/dL   POCT activated clotting time device results    Collection Time: 12/20/24  9:50 AM   Result Value Ref Range    Istat Activated Clotting Time 510 (H) 74 - 137 sec   POCT glucose device results    Collection Time: 12/20/24 10:16 AM   Result Value Ref Range    POC Glucose, Blood 189 (H) 65 - 99 mg/dL   POCT arterial blood gas device results    Collection Time: 12/20/24 10:19 AM   Result Value Ref Range    Ph 7.310 (L) 7.350 - 7.450    Pco2 47.1 32.0 - 48.0 mmHg    Po2 284 (H) 83 - 108 mmHg    Tco2 25 (L) 32 - 48 mmol/L    S02 100 (H) 93 - 99 %    Hco3 23.7 21.0 - 28.0 mmol/L    BE -3 -4  - 3 mmol/L    Body Temp 34.5 C degrees    Ph Temp Miguel 7.345 (L) 7.350 - 7.450    Pco2 Temp Co 42.3 32.0 - 48.0 mmHg    Po2 Temp Cor 273 (H) 64 - 87 mmHg    Specimen Arterial    POCT sodium device results    Collection Time: 12/20/24 10:19 AM   Result Value Ref Range    Istat Sodium 137 135 - 145 mmol/L   POCT potassium device results    Collection Time: 12/20/24 10:19 AM   Result Value Ref Range    Istat Potassium 5.4 3.6 - 5.5 mmol/L   POCT ionized CA device results    Collection Time: 12/20/24 10:19 AM   Result Value Ref Range    Istat Ionized Calcium 1.10 1.10 - 1.30 mmol/L   POCT hematocrit and hemoglobin device results    Collection Time: 12/20/24 10:19 AM   Result Value Ref Range    Istat Hematocrit 37 (L) 42 - 52 %    Istat Hemoglobin 12.6 (L) 14.0 - 18.0 g/dL   POCT activated clotting time device results    Collection Time: 12/20/24 10:25 AM   Result Value Ref Range    Istat Activated Clotting Time 544 (H) 74 - 137 sec   POCT glucose device results    Collection Time: 12/20/24 10:51 AM   Result Value Ref Range    POC Glucose, Blood 162 (H) 65 - 99 mg/dL   POCT arterial blood gas device results    Collection Time: 12/20/24 10:53 AM   Result Value Ref Range    Ph 7.316 (L) 7.350 - 7.450    Pco2 47.4 32.0 - 48.0 mmHg    Po2 245 (H) 83 - 108 mmHg    Tco2 26 (L) 32 - 48 mmol/L    S02 100 (H) 93 - 99 %    Hco3 24.2 21.0 - 28.0 mmol/L    BE -2 -4 - 3 mmol/L    Body Temp 36.0 C degrees    Ph Temp Miguel 7.331 (L) 7.350 - 7.450    Pco2 Temp Co 45.4 32.0 - 48.0 mmHg    Po2 Temp Cor 241 (H) 64 - 87 mmHg    Specimen Arterial    POCT sodium device results    Collection Time: 12/20/24 10:53 AM   Result Value Ref Range    Istat Sodium 137 135 - 145 mmol/L   POCT potassium device results    Collection Time: 12/20/24 10:53 AM   Result Value Ref Range    Istat Potassium 5.9 (H) 3.6 - 5.5 mmol/L   POCT ionized CA device results    Collection Time: 12/20/24 10:53 AM   Result Value Ref Range    Istat Ionized Calcium 1.09 (L) 1.10  - 1.30 mmol/L   POCT hematocrit and hemoglobin device results    Collection Time: 12/20/24 10:53 AM   Result Value Ref Range    Istat Hematocrit 35 (L) 42 - 52 %    Istat Hemoglobin 11.9 (L) 14.0 - 18.0 g/dL   POCT activated clotting time device results    Collection Time: 12/20/24 11:00 AM   Result Value Ref Range    Istat Activated Clotting Time 608 (H) 74 - 137 sec   EC-PEDRO W/O CONT    Collection Time: 12/20/24 12:06 PM   Result Value Ref Range    Left Ventrical Ejection Fraction 40    POCT glucose device results    Collection Time: 12/20/24 12:08 PM   Result Value Ref Range    POC Glucose, Blood 88 65 - 99 mg/dL   POCT activated clotting time device results    Collection Time: 12/20/24 12:10 PM   Result Value Ref Range    Istat Activated Clotting Time 112 74 - 137 sec   POCT arterial blood gas device results    Collection Time: 12/20/24 12:10 PM   Result Value Ref Range    Ph 7.340 (L) 7.350 - 7.450    Pco2 41.5 32.0 - 48.0 mmHg    Po2 307 (H) 83 - 108 mmHg    Tco2 24 (L) 32 - 48 mmol/L    S02 100 (H) 93 - 99 %    Hco3 22.4 21.0 - 28.0 mmol/L    BE -3 -4 - 3 mmol/L    Body Temp 36.5 C degrees    Ph Temp Miguel 7.348 (L) 7.350 - 7.450    Pco2 Temp Co 40.6 32.0 - 48.0 mmHg    Po2 Temp Cor 304 (H) 64 - 87 mmHg    Specimen Arterial    POCT sodium device results    Collection Time: 12/20/24 12:10 PM   Result Value Ref Range    Istat Sodium 141 135 - 145 mmol/L   POCT potassium device results    Collection Time: 12/20/24 12:10 PM   Result Value Ref Range    Istat Potassium 4.2 3.6 - 5.5 mmol/L   POCT ionized CA device results    Collection Time: 12/20/24 12:10 PM   Result Value Ref Range    Istat Ionized Calcium 1.18 1.10 - 1.30 mmol/L   POCT hematocrit and hemoglobin device results    Collection Time: 12/20/24 12:10 PM   Result Value Ref Range    Istat Hematocrit 36 (L) 42 - 52 %    Istat Hemoglobin 12.2 (L) 14.0 - 18.0 g/dL   POCT glucose device results    Collection Time: 12/20/24 12:48 PM   Result Value Ref Range     POC Glucose, Blood 126 (H) 65 - 99 mg/dL   FRESH FROZEN PLASMA    Collection Time: 12/20/24 12:54 PM   Result Value Ref Range    Component F       TA                  Thawed Plasma       [id number]   transfused   12/20/24   12:56      Product Type Thawed Apheresis Plasma     Dispense Status transfused     Unit Number (Barcoded) [id number]     Product Code (Barcoded) Y2612L65     Blood Type (Barcoded) 7300     Component F       TA-B0               Thawed Plasma       [id number]   transfused   12/20/24   13:22      Product Type Thawed Apheresis Plasma     Dispense Status transfused     Unit Number (Barcoded) [id number]     Product Code (Barcoded) [id number]     Blood Type (Barcoded) 8400     Component F       TA4                 Thawed Plasma 4     [id number]   transfused   12/20/24   13:59      Product Type Thawed Apheresis Plasma 4th Cont     Dispense Status transfused     Unit Number (Barcoded) [id number]     Product Code (Barcoded) J5489B99     Blood Type (Barcoded) 7300     Component F       TA-A0               Thawed Plasma       [id number]   transfused   12/20/24   14:18      Product Type Thawed Apheresis Plasma     Dispense Status transfused     Unit Number (Barcoded) [id number]     Product Code (Barcoded) N4957TT3     Blood Type (Barcoded) 8400    CRYOPRECIPITATE    Collection Time: 12/20/24 12:54 PM   Result Value Ref Range    Component Ct       CTP                 Cryoprecipitate     [id number]   transfused   12/20/24   13:18      Product Type CTP     Dispense Status transfused     Unit Number (Barcoded) [id number]     Product Code (Barcoded) T6287Q88     Blood Type (Barcoded) 5100     Component Ct       CTP                 Cryoprecipitate     [id number]   transfused   12/20/24   14:15      Product Type CTP     Dispense Status transfused     Unit Number (Barcoded) [id number]     Product Code (Barcoded) D0080D35     Blood Type (Barcoded) 5100    POCT arterial blood gas  device results    Collection Time: 12/20/24 12:59 PM   Result Value Ref Range    Ph 7.365 7.350 - 7.450    Pco2 32.2 32.0 - 48.0 mmHg    Po2 270 (H) 83 - 108 mmHg    Tco2 19 (L) 32 - 48 mmol/L    S02 100 (H) 93 - 99 %    Hco3 18.4 (L) 21.0 - 28.0 mmol/L    BE -6 (L) -4 - 3 mmol/L    Body Temp 36.7 C degrees    O2 Therapy 100 %    iPF Ratio 270     Ph Temp Miguel 7.369 7.350 - 7.450    Pco2 Temp Co 31.8 (L) 32.0 - 48.0 mmHg    Po2 Temp Cor 269 (H) 64 - 87 mmHg    Specimen Arterial     DelSys Vent     Peep End Expiratory Pressure 8 cmh20    Percent Minute Volume 160     Mode ASV    POCT sodium device results    Collection Time: 12/20/24 12:59 PM   Result Value Ref Range    Istat Sodium 139 135 - 145 mmol/L   POCT potassium device results    Collection Time: 12/20/24 12:59 PM   Result Value Ref Range    Istat Potassium 4.4 3.6 - 5.5 mmol/L   POCT ionized CA device results    Collection Time: 12/20/24 12:59 PM   Result Value Ref Range    Istat Ionized Calcium 1.14 1.10 - 1.30 mmol/L   Potassium Serum (K)    Collection Time: 12/20/24  1:00 PM   Result Value Ref Range    Potassium 4.5 3.6 - 5.5 mmol/L   Prothrombin time (INR)    Collection Time: 12/20/24  1:00 PM   Result Value Ref Range    PT 20.2 (H) 12.0 - 14.6 sec    INR 1.72 (H) 0.87 - 1.13   APTT (PTT)    Collection Time: 12/20/24  1:00 PM   Result Value Ref Range    APTT 32.1 24.7 - 36.0 sec   Hemoglobin and Hematocrit upon arrival to unit    Collection Time: 12/20/24  1:00 PM   Result Value Ref Range    Hemoglobin 11.9 (L) 14.0 - 18.0 g/dL    Hematocrit 35.0 (L) 42.0 - 52.0 %   PLATELET COUNT    Collection Time: 12/20/24  1:00 PM   Result Value Ref Range    Platelet Count 184 164 - 446 K/uL   MAGNESIUM    Collection Time: 12/20/24  1:00 PM   Result Value Ref Range    Magnesium 4.3 (H) 1.5 - 2.5 mg/dL   HEMOGLOBIN AND HEMATOCRIT    Collection Time: 12/20/24  1:40 PM   Result Value Ref Range    Hemoglobin 8.3 (L) 14.0 - 18.0 g/dL    Hematocrit 24.7 (L) 42.0 - 52.0 %    Prothrombin Time    Collection Time: 12/20/24  1:40 PM   Result Value Ref Range    PT 19.8 (H) 12.0 - 14.6 sec    INR 1.67 (H) 0.87 - 1.13   FIBRINOGEN    Collection Time: 12/20/24  1:40 PM   Result Value Ref Range    Fibrinogen 197 (L) 215 - 460 mg/dL   POCT arterial blood gas device results    Collection Time: 12/20/24  2:18 PM   Result Value Ref Range    Ph 7.327 (L) 7.350 - 7.450    Pco2 34.7 32.0 - 48.0 mmHg    Po2 127 (H) 83 - 108 mmHg    Tco2 19 (L) 32 - 48 mmol/L    S02 99 93 - 99 %    Hco3 18.2 (L) 21.0 - 28.0 mmol/L    BE -7 (L) -4 - 3 mmol/L    Body Temp 36.4 C degrees    O2 Therapy 40 %    iPF Ratio 318     Ph Temp Miguel 7.335 (L) 7.350 - 7.450    Pco2 Temp Co 33.8 32.0 - 48.0 mmHg    Po2 Temp Cor 124 (H) 64 - 87 mmHg    Specimen Arterial     DelSys Vent     Peep End Expiratory Pressure 8 cmh20    Percent Minute Volume 160     Mode ASV    POCT lactate device results    Collection Time: 12/20/24  2:18 PM   Result Value Ref Range    iStat Lactate 5.5 (HH) 0.5 - 2.0 mmol/L   POCT glucose device results    Collection Time: 12/20/24  2:19 PM   Result Value Ref Range    POC Glucose, Blood 265 (H) 65 - 99 mg/dL   POCT glucose device results    Collection Time: 12/20/24  3:33 PM   Result Value Ref Range    POC Glucose, Blood 229 (H) 65 - 99 mg/dL   POCT arterial blood gas device results    Collection Time: 12/20/24  3:35 PM   Result Value Ref Range    Ph 7.352 7.350 - 7.450    Pco2 31.4 (L) 32.0 - 48.0 mmHg    Po2 98 83 - 108 mmHg    Tco2 18 (L) 32 - 48 mmol/L    S02 97 93 - 99 %    Hco3 17.4 (L) 21.0 - 28.0 mmol/L    BE -8 (L) -4 - 3 mmol/L    Body Temp 36.0 C degrees    O2 Therapy 30 %    iPF Ratio 327     Ph Temp Miguel 7.366 7.350 - 7.450    Pco2 Temp Co 30.1 (L) 32.0 - 48.0 mmHg    Po2 Temp Cor 92 (H) 64 - 87 mmHg    Specimen Arterial     DelSys Vent     End Tidal Carbon Dioxide 28 mmhg    Peep End Expiratory Pressure 8 cmh20    Percent Minute Volume 160     Mode ASV    POCT glucose device results     Collection Time: 24  4:39 PM   Result Value Ref Range    POC Glucose, Blood 212 (H) 65 - 99 mg/dL   POCT arterial blood gas device results    Collection Time: 24  5:00 PM   Result Value Ref Range    Ph 7.335 (L) 7.350 - 7.450    Pco2 31.0 (L) 32.0 - 48.0 mmHg    Po2 81 (L) 83 - 108 mmHg    Tco2 17 (L) 32 - 48 mmol/L    S02 95 93 - 99 %    Hco3 16.5 (L) 21.0 - 28.0 mmol/L    BE -9 (L) -4 - 3 mmol/L    Body Temp 36.3 C degrees    O2 Therapy 30 %    iPF Ratio 270     Ph Temp Miguel 7.345 (L) 7.350 - 7.450    Pco2 Temp Co 30.0 (L) 32.0 - 48.0 mmHg    Po2 Temp Cor 77 64 - 87 mmHg    Specimen Arterial     DelSys Vent     End Tidal Carbon Dioxide 30 mmhg    Peep End Expiratory Pressure 8 cmh20    Percent Minute Volume 130     Mode ASV    POCT lactate device results    Collection Time: 24  5:00 PM   Result Value Ref Range    iStat Lactate 6.5 (HH) 0.5 - 2.0 mmol/L   EKG on arrival to CSU    Collection Time: 24  5:09 PM   Result Value Ref Range    Report       [facility]    Test Date:  2024  Pt Name:    FIFI WILKINSON              Department: 161  MRN:        [id number]                      Room:       [facility]  Gender:     Male                         Technician: J  :        1955                   Requested By:NORMA ARMENTA  Order #:    [id number]                    Reading MD: Cristian Whipple MD    Measurements  Intervals                                Axis  Rate:       71                           P:          75  MN:         176                          QRS:        78  QRSD:       96                           T:          60  QT:         427  QTc:        465    Interpretive Statements  Sinus rhythm  Probable anteroseptal infarct, old  Minimal ST depression, inferior leads  Electronically Signed On 2024 17:09:19 PST by Cristian Whipple MD     Potassium Serum (K)    Collection Time: 24  5:35 PM   Result Value Ref Range    Potassium 4.4 3.6 - 5.5 mmol/L   HEMOGLOBIN AND  HEMATOCRIT    Collection Time: 12/20/24  5:35 PM   Result Value Ref Range    Hemoglobin 8.3 (L) 14.0 - 18.0 g/dL    Hematocrit 24.7 (L) 42.0 - 52.0 %   POCT glucose device results    Collection Time: 12/20/24  5:40 PM   Result Value Ref Range    POC Glucose, Blood 194 (H) 65 - 99 mg/dL   POCT glucose device results    Collection Time: 12/20/24  6:36 PM   Result Value Ref Range    POC Glucose, Blood 190 (H) 65 - 99 mg/dL   POCT arterial blood gas device results    Collection Time: 12/20/24  6:42 PM   Result Value Ref Range    Ph 7.369 7.350 - 7.450    Pco2 28.9 (L) 32.0 - 48.0 mmHg    Po2 90 83 - 108 mmHg    Tco2 18 (L) 32 - 48 mmol/L    S02 97 93 - 99 %    Hco3 16.7 (L) 21.0 - 28.0 mmol/L    BE -8 (L) -4 - 3 mmol/L    Body Temp 36.9 C degrees    O2 Therapy 30 %    iPF Ratio 300     Ph Temp Miguel 7.371 7.350 - 7.450    Pco2 Temp Co 28.7 (L) 32.0 - 48.0 mmHg    Po2 Temp Cor 89 (H) 64 - 87 mmHg    Specimen Arterial     DelSys Vent     End Tidal Carbon Dioxide 28 mmhg    Peep End Expiratory Pressure 8 cmh20    Percent Minute Volume 100     Mode ASV    POCT glucose device results    Collection Time: 12/20/24  7:35 PM   Result Value Ref Range    POC Glucose, Blood 154 (H) 65 - 99 mg/dL   POCT glucose device results    Collection Time: 12/20/24  8:32 PM   Result Value Ref Range    POC Glucose, Blood 143 (H) 65 - 99 mg/dL   HEMOGLOBIN AND HEMATOCRIT    Collection Time: 12/20/24  9:29 PM   Result Value Ref Range    Hemoglobin 9.5 (L) 14.0 - 18.0 g/dL    Hematocrit 27.0 (L) 42.0 - 52.0 %   IONIZED CALCIUM    Collection Time: 12/20/24  9:29 PM   Result Value Ref Range    Ionized Calcium 1.1 1.1 - 1.3 mmol/L   POCT glucose device results    Collection Time: 12/20/24  9:32 PM   Result Value Ref Range    POC Glucose, Blood 110 (H) 65 - 99 mg/dL   POCT glucose device results    Collection Time: 12/20/24 10:02 PM   Result Value Ref Range    POC Glucose, Blood 124 (H) 65 - 99 mg/dL   POCT glucose device results    Collection Time:  12/20/24 11:00 PM   Result Value Ref Range    POC Glucose, Blood 168 (H) 65 - 99 mg/dL   POCT arterial blood gas device results    Collection Time: 12/20/24 11:01 PM   Result Value Ref Range    Ph 7.412 7.350 - 7.450    Pco2 27.8 (L) 32.0 - 48.0 mmHg    Po2 86 83 - 108 mmHg    Tco2 19 (L) 32 - 48 mmol/L    S02 97 93 - 99 %    Hco3 17.7 (L) 21.0 - 28.0 mmol/L    BE -6 (L) -4 - 3 mmol/L    Body Temp 37.8 C degrees    O2 Therapy 30 %    iPF Ratio 287     Ph Temp Miguel 7.401 7.350 - 7.450    Pco2 Temp Co 28.8 (L) 32.0 - 48.0 mmHg    Po2 Temp Cor 91 (H) 64 - 87 mmHg    Specimen Arterial     DelSys Vent     Peep End Expiratory Pressure 8 cmh20    Pressure Support 5     Mode CPAP/Spont    POCT glucose device results    Collection Time: 12/21/24 12:05 AM   Result Value Ref Range    POC Glucose, Blood 165 (H) 65 - 99 mg/dL   Potassium Serum (K)    Collection Time: 12/21/24 12:07 AM   Result Value Ref Range    Potassium 5.2 3.6 - 5.5 mmol/L   POCT glucose device results    Collection Time: 12/21/24  1:10 AM   Result Value Ref Range    POC Glucose, Blood 159 (H) 65 - 99 mg/dL   POCT glucose device results    Collection Time: 12/21/24  2:13 AM   Result Value Ref Range    POC Glucose, Blood 140 (H) 65 - 99 mg/dL   POCT glucose device results    Collection Time: 12/21/24  3:07 AM   Result Value Ref Range    POC Glucose, Blood 131 (H) 65 - 99 mg/dL   POCT glucose device results    Collection Time: 12/21/24  4:10 AM   Result Value Ref Range    POC Glucose, Blood 134 (H) 65 - 99 mg/dL   Ionized Calcium    Collection Time: 12/21/24  4:15 AM   Result Value Ref Range    Ionized Calcium 1.1 1.1 - 1.3 mmol/L   CBC without Differential Critical Care 0130    Collection Time: 12/21/24  4:15 AM   Result Value Ref Range    WBC 17.9 (H) 4.8 - 10.8 K/uL    RBC 2.83 (L) 4.70 - 6.10 M/uL    Hemoglobin 8.4 (L) 14.0 - 18.0 g/dL    Hematocrit 24.4 (L) 42.0 - 52.0 %    MCV 86.2 81.4 - 97.8 fL    MCH 29.7 27.0 - 33.0 pg    MCHC 34.4 32.3 - 36.5 g/dL     RDW 45.1 35.9 - 50.0 fL    Platelet Count 162 (L) 164 - 446 K/uL    MPV 11.3 9.0 - 12.9 fL   Basic Metabolic Panel (BMP) Critical Care 0130    Collection Time: 24  4:15 AM   Result Value Ref Range    Sodium 138 135 - 145 mmol/L    Potassium 5.6 (H) 3.6 - 5.5 mmol/L    Chloride 109 96 - 112 mmol/L    Co2 18 (L) 20 - 33 mmol/L    Glucose 157 (H) 65 - 99 mg/dL    Bun 29 (H) 8 - 22 mg/dL    Creatinine 1.57 (H) 0.50 - 1.40 mg/dL    Calcium 8.2 (L) 8.5 - 10.5 mg/dL    Anion Gap 11.0 7.0 - 16.0   ESTIMATED GFR    Collection Time: 24  4:15 AM   Result Value Ref Range    GFR (CKD-EPI) 48 (A) >60 mL/min/1.73 m 2   POCT glucose device results    Collection Time: 24  5:39 AM   Result Value Ref Range    POC Glucose, Blood 152 (H) 65 - 99 mg/dL   EKG in the AM Post Op Day #1 (Specify Time)    Collection Time: 24  6:05 AM   Result Value Ref Range    Report       [facility]    Test Date:  2024  Pt Name:    FIFI WILKINSON              Department: 161  MRN:        [id number]                      Room:       [facility]  Gender:     Male                         Technician: SLICK  :        1955                   Requested By:NORMA ARMENTA  Order #:    [id number]                    Reading MD: Fani Grimaldo    Measurements  Intervals                                Axis  Rate:       54                           P:          53  MO:         180                          QRS:        59  QRSD:       97                           T:          -15  QT:         486  QTc:        461    Interpretive Statements  Sinus bradycardia, 54 bpm  Nonspecific ST changes  Compared to ECG 2024 13:10:15  Similar in appearance except rate is slower  Electronically Signed On 2024 06:05:19 PST by Fani Grimaldo     POCT glucose device results    Collection Time: 24  6:58 AM   Result Value Ref Range    POC Glucose, Blood 174 (H) 65 - 99 mg/dL   POCT glucose device results    Collection Time: 24  8:08 AM   Result  Value Ref Range    POC Glucose, Blood 116 (H) 65 - 99 mg/dL   POCT glucose device results    Collection Time: 12/21/24  8:42 AM   Result Value Ref Range    POC Glucose, Blood 125 (H) 65 - 99 mg/dL   POCT glucose device results    Collection Time: 12/21/24  9:12 AM   Result Value Ref Range    POC Glucose, Blood 131 (H) 65 - 99 mg/dL   POCT glucose device results    Collection Time: 12/21/24 10:26 AM   Result Value Ref Range    POC Glucose, Blood 165 (H) 65 - 99 mg/dL   POCT glucose device results    Collection Time: 12/21/24 11:35 AM   Result Value Ref Range    POC Glucose, Blood 155 (H) 65 - 99 mg/dL   HEMOGLOBIN AND HEMATOCRIT    Collection Time: 12/21/24 11:37 AM   Result Value Ref Range    Hemoglobin 8.0 (L) 14.0 - 18.0 g/dL    Hematocrit 23.3 (L) 42.0 - 52.0 %   Renal Function Panel    Collection Time: 12/21/24 11:37 AM   Result Value Ref Range    Sodium 135 135 - 145 mmol/L    Potassium 5.3 3.6 - 5.5 mmol/L    Chloride 104 96 - 112 mmol/L    Co2 17 (L) 20 - 33 mmol/L    Glucose 171 (H) 65 - 99 mg/dL    Creatinine 2.25 (H) 0.50 - 1.40 mg/dL    Bun 36 (H) 8 - 22 mg/dL    Calcium 8.4 (L) 8.5 - 10.5 mg/dL    Correct Calcium 9.0 8.5 - 10.5 mg/dL    Phosphorus 6.7 (H) 2.5 - 4.5 mg/dL    Albumin 3.2 3.2 - 4.9 g/dL   ESTIMATED GFR    Collection Time: 12/21/24 11:37 AM   Result Value Ref Range    GFR (CKD-EPI) 31 (A) >60 mL/min/1.73 m 2   POCT arterial blood gas device results    Collection Time: 12/21/24  4:34 PM   Result Value Ref Range    Ph 7.389 7.350 - 7.450    Pco2 30.3 (L) 32.0 - 48.0 mmHg    Po2 53 (L) 83 - 108 mmHg    Tco2 19 (L) 32 - 48 mmol/L    S02 87 (L) 93 - 99 %    Hco3 18.3 (L) 21.0 - 28.0 mmol/L    BE -6 (L) -4 - 3 mmol/L    Body Temp 97.8 F degrees    Ph Temp Miguel 7.395 7.350 - 7.450    Pco2 Temp Co 29.7 (L) 32.0 - 48.0 mmHg    Po2 Temp Cor 51 (L) 64 - 87 mmHg    Specimen Arterial     DelSys Other     LPM 0 lpm   POCT lactate device results    Collection Time: 12/21/24  4:34 PM   Result Value Ref  Range    iStat Lactate 3.6 (H) 0.5 - 2.0 mmol/L   POCT glucose device results    Collection Time: 12/21/24  5:38 PM   Result Value Ref Range    POC Glucose, Blood 125 (H) 65 - 99 mg/dL   POCT glucose device results    Collection Time: 12/21/24  8:34 PM   Result Value Ref Range    POC Glucose, Blood 148 (H) 65 - 99 mg/dL   CBC without Differential Critical Care 0130    Collection Time: 12/22/24 12:16 AM   Result Value Ref Range    WBC 21.6 (H) 4.8 - 10.8 K/uL    RBC 2.20 (L) 4.70 - 6.10 M/uL    Hemoglobin 6.6 (L) 14.0 - 18.0 g/dL    Hematocrit 18.9 (L) 42.0 - 52.0 %    MCV 85.9 81.4 - 97.8 fL    MCH 30.0 27.0 - 33.0 pg    MCHC 34.9 32.3 - 36.5 g/dL    RDW 45.5 35.9 - 50.0 fL    Platelet Count 136 (L) 164 - 446 K/uL    MPV 12.3 9.0 - 12.9 fL   Basic Metabolic Panel (BMP) Critical Care 0130    Collection Time: 12/22/24 12:16 AM   Result Value Ref Range    Sodium 135 135 - 145 mmol/L    Potassium 4.8 3.6 - 5.5 mmol/L    Chloride 103 96 - 112 mmol/L    Co2 20 20 - 33 mmol/L    Glucose 153 (H) 65 - 99 mg/dL    Bun 46 (H) 8 - 22 mg/dL    Creatinine 2.42 (H) 0.50 - 1.40 mg/dL    Calcium 8.1 (L) 8.5 - 10.5 mg/dL    Anion Gap 12.0 7.0 - 16.0   ESTIMATED GFR    Collection Time: 12/22/24 12:16 AM   Result Value Ref Range    GFR (CKD-EPI) 28 (A) >60 mL/min/1.73 m 2   POCT arterial blood gas device results    Collection Time: 12/22/24  5:21 AM   Result Value Ref Range    Ph 7.432 7.350 - 7.450    Pco2 34.4 32.0 - 48.0 mmHg    Po2 55 (L) 83 - 108 mmHg    Tco2 24 (L) 32 - 48 mmol/L    S02 89 (L) 93 - 99 %    Hco3 23.0 21.0 - 28.0 mmol/L    BE -1 -4 - 3 mmol/L    Body Temp 37.7 C degrees    Ph Temp Miguel 7.422 7.350 - 7.450    Pco2 Temp Co 35.5 32.0 - 48.0 mmHg    Po2 Temp Cor 58 (L) 64 - 87 mmHg    Specimen Arterial    POCT lactate device results    Collection Time: 12/22/24  5:21 AM   Result Value Ref Range    iStat Lactate 1.5 0.5 - 2.0 mmol/L   POCT glucose device results    Collection Time: 12/22/24  5:25 AM   Result Value Ref  Range    POC Glucose, Blood 144 (H) 65 - 99 mg/dL   HEMOGLOBIN AND HEMATOCRIT    Collection Time: 24  5:26 AM   Result Value Ref Range    Hemoglobin 8.2 (L) 14.0 - 18.0 g/dL    Hematocrit 23.1 (L) 42.0 - 52.0 %   POCT glucose device results    Collection Time: 24  7:21 AM   Result Value Ref Range    POC Glucose, Blood 131 (H) 65 - 99 mg/dL   POCT glucose device results    Collection Time: 24 12:08 PM   Result Value Ref Range    POC Glucose, Blood 82 65 - 99 mg/dL   CBC without Differential Critical Care 0130    Collection Time: 24  1:14 AM   Result Value Ref Range    WBC 18.3 (H) 4.8 - 10.8 K/uL    RBC 2.45 (L) 4.70 - 6.10 M/uL    Hemoglobin 7.1 (L) 14.0 - 18.0 g/dL    Hematocrit 21.1 (L) 42.0 - 52.0 %    MCV 86.1 81.4 - 97.8 fL    MCH 29.0 27.0 - 33.0 pg    MCHC 33.6 32.3 - 36.5 g/dL    RDW 45.7 35.9 - 50.0 fL    Platelet Count 141 (L) 164 - 446 K/uL    MPV 12.1 9.0 - 12.9 fL   Basic Metabolic Panel (BMP) Critical Care 0130    Collection Time: 24  1:14 AM   Result Value Ref Range    Sodium 136 135 - 145 mmol/L    Potassium 3.7 3.6 - 5.5 mmol/L    Chloride 99 96 - 112 mmol/L    Co2 27 20 - 33 mmol/L    Glucose 115 (H) 65 - 99 mg/dL    Bun 40 (H) 8 - 22 mg/dL    Creatinine 1.50 (H) 0.50 - 1.40 mg/dL    Calcium 8.0 (L) 8.5 - 10.5 mg/dL    Anion Gap 10.0 7.0 - 16.0   ESTIMATED GFR    Collection Time: 24  1:14 AM   Result Value Ref Range    GFR (CKD-EPI) 50 (A) >60 mL/min/1.73 m 2   HEMOGLOBIN AND HEMATOCRIT    Collection Time: 24  9:00 AM   Result Value Ref Range    Hemoglobin 9.7 (L) 14.0 - 18.0 g/dL    Hematocrit 28.6 (L) 42.0 - 52.0 %   EKG    Collection Time: 24 10:54 AM   Result Value Ref Range    Report       [facility]    Test Date:  2024  Pt Name:    FIFI WILKINSON              Department: 161  MRN:        [id number]                      Room:       [facility]  Gender:     Male                         Technician: ANA  :        1955                    Requested By:YONG ARMENTA  Order #:    [id number]                    Reading MD: Marbella Mcqueen MD    Measurements  Intervals                                Axis  Rate:       64                           P:          0  OH:         0                            QRS:        24  QRSD:       134                          T:          -5  QT:         482  QTc:        498    Interpretive Statements  Sinus or ectopic atrial rhythm  Right bundle branch block  Left ventricular hypertrophy  Repol abnrm suggests ischemia, lateral leads    Electronically Signed On 12- 10:54:06 PST by Marbella Mcqueen MD     CBC without Differential Critical Care 0130    Collection Time: 12/24/24  1:17 AM   Result Value Ref Range    WBC 15.2 (H) 4.8 - 10.8 K/uL    RBC 2.99 (L) 4.70 - 6.10 M/uL    Hemoglobin 9.1 (L) 14.0 - 18.0 g/dL    Hematocrit 25.9 (L) 42.0 - 52.0 %    MCV 86.6 81.4 - 97.8 fL    MCH 30.4 27.0 - 33.0 pg    MCHC 35.1 32.3 - 36.5 g/dL    RDW 43.9 35.9 - 50.0 fL    Platelet Count 161 (L) 164 - 446 K/uL    MPV 11.8 9.0 - 12.9 fL   Basic Metabolic Panel (BMP) Critical Care 0130    Collection Time: 12/24/24  1:17 AM   Result Value Ref Range    Sodium 139 135 - 145 mmol/L    Potassium 3.8 3.6 - 5.5 mmol/L    Chloride 101 96 - 112 mmol/L    Co2 26 20 - 33 mmol/L    Glucose 102 (H) 65 - 99 mg/dL    Bun 33 (H) 8 - 22 mg/dL    Creatinine 1.09 0.50 - 1.40 mg/dL    Calcium 7.9 (L) 8.5 - 10.5 mg/dL    Anion Gap 12.0 7.0 - 16.0   MAGNESIUM    Collection Time: 12/24/24  1:17 AM   Result Value Ref Range    Magnesium 2.4 1.5 - 2.5 mg/dL   ESTIMATED GFR    Collection Time: 12/24/24  1:17 AM   Result Value Ref Range    GFR (CKD-EPI) 74 >60 mL/min/1.73 m 2   EKG    Collection Time: 12/24/24 11:46 PM   Result Value Ref Range    Report       [facility]    Test Date:  2024-12-24  Pt Name:    FIFI WILKINSON              Department: 161  MRN:        [id number]                      Room:       [facility]  Gender:     Male                          Technician: SHADY  :        1955                   Requested By:FALLON BRUCE  Order #:    [id number]                    Reading MD: Marbella Mcqueen MD    Measurements  Intervals                                Axis  Rate:       107                          P:          0  WI:         0                            QRS:        45  QRSD:       110                          T:          -31  QT:         387  QTc:        517    Interpretive Statements  Atrial fibrillation  Probable LVH with secondary repol abnrm  Prolonged QT interval  Electronically Signed On 2024 23:46:57 PST by Marbella Mcqueen MD     CBC without Differential Critical Care 0130    Collection Time: 24  4:50 AM   Result Value Ref Range    WBC 15.9 (H) 4.8 - 10.8 K/uL    RBC 3.29 (L) 4.70 - 6.10 M/uL    Hemoglobin 9.6 (L) 14.0 - 18.0 g/dL    Hematocrit 29.4 (L) 42.0 - 52.0 %    MCV 89.4 81.4 - 97.8 fL    MCH 29.2 27.0 - 33.0 pg    MCHC 32.7 32.3 - 36.5 g/dL    RDW 44.9 35.9 - 50.0 fL    Platelet Count 242 164 - 446 K/uL    MPV 11.1 9.0 - 12.9 fL   Basic Metabolic Panel (BMP) Critical Care 0130    Collection Time: 24  4:50 AM   Result Value Ref Range    Sodium 137 135 - 145 mmol/L    Potassium 3.9 3.6 - 5.5 mmol/L    Chloride 100 96 - 112 mmol/L    Co2 25 20 - 33 mmol/L    Glucose 115 (H) 65 - 99 mg/dL    Bun 26 (H) 8 - 22 mg/dL    Creatinine 1.18 0.50 - 1.40 mg/dL    Calcium 8.6 8.5 - 10.5 mg/dL    Anion Gap 12.0 7.0 - 16.0   ESTIMATED GFR    Collection Time: 24  4:50 AM   Result Value Ref Range    GFR (CKD-EPI) 67 >60 mL/min/1.73 m 2   IMMATURE PLT FRACTION    Collection Time: 24  4:50 AM   Result Value Ref Range    Imm. Plt Fraction 7.1 0.6 - 13.1 %   EKG    Collection Time: 25  1:19 PM   Result Value Ref Range    Report       [facility]    Test Date:  2025  Pt Name:    FIFI WILKINSON              Department: EKG  MRN:        [id number]                      Room:  Gender:     Male                          Technician: [facility]  :        [id number]                   Requested By:KRIS ROBERTS  Order #:    [id number]                    Reading MD: Joey Garcia MD    Measurements  Intervals                                Axis  Rate:       71                           P:          67  MD:         168                          QRS:        2  QRSD:       103                          T:          5  QT:         419  QTc:        456    Interpretive Statements  Sinus rhythm  Probable left atrial enlargement  Electronically Signed On 2025 13:19:55 PDT by Joey Garcia MD             Assessment & Plan     1. Status post aortic valve replacement - Bio-Bentall, 27 mm Konect Rogel valved conduit for severe AS of Bicuspid AoV and TAA 2024        2. S/P AVR (aortic valve replacement)  lisinopril (PRINIVIL) 20 MG Tab      3. Aneurysm of ascending aorta without rupture (HCC)        4. Essential hypertension            Medical Decision Making: Today's Assessment/Status/Plan:          It was my pleasure to meet with Mr. Epperson.    We addressed the management of hypertension at today's visit. Blood pressure is well controlled.  We specifically assessed the labs on hypertension treatment    Extra lisinopril as necessary    We addressed the management of dyslipidemia at today's visit. He is on appropriate lipid lowering medication.      I will see Mr. Epperson back in 4 months time and encouraged him to follow up with us over the phone or electronically using my MyChart as issues arise.    It is my pleasure to participate in the care of Mr. Epperson.  Please do not hesitate to contact me with questions or concerns.    Kris Roberts MD PhD FAC  Cardiologist [facility]    Please note that this dictation was created using voice recognition software. There may be errors I did not discover before finalizing the note.

## 2025-05-19 ENCOUNTER — APPOINTMENT (OUTPATIENT)
Dept: MEDICAL GROUP | Facility: MEDICAL CENTER | Age: 70
End: 2025-05-19
Payer: MEDICARE

## 2025-07-16 DIAGNOSIS — E78.00 PURE HYPERCHOLESTEROLEMIA: ICD-10-CM

## 2025-07-16 DIAGNOSIS — R80.9 MICROALBUMINURIA: ICD-10-CM

## 2025-07-16 DIAGNOSIS — D69.6 THROMBOCYTOPENIA (HCC): ICD-10-CM

## 2025-07-16 DIAGNOSIS — R73.03 PREDIABETES: ICD-10-CM

## 2025-07-16 DIAGNOSIS — I71.21 ANEURYSM OF ASCENDING AORTA WITHOUT RUPTURE (HCC): Primary | ICD-10-CM

## 2025-07-22 ENCOUNTER — HOSPITAL ENCOUNTER (OUTPATIENT)
Dept: LAB | Facility: MEDICAL CENTER | Age: 70
End: 2025-07-22
Attending: FAMILY MEDICINE
Payer: MEDICARE

## 2025-07-22 DIAGNOSIS — R80.9 MICROALBUMINURIA: ICD-10-CM

## 2025-07-22 DIAGNOSIS — E78.00 PURE HYPERCHOLESTEROLEMIA: ICD-10-CM

## 2025-07-22 DIAGNOSIS — R73.03 PREDIABETES: ICD-10-CM

## 2025-07-22 DIAGNOSIS — D69.6 THROMBOCYTOPENIA (HCC): ICD-10-CM

## 2025-07-22 LAB
ALBUMIN SERPL BCP-MCNC: 4.4 G/DL (ref 3.2–4.9)
ALBUMIN/GLOB SERPL: 1.7 G/DL
ALP SERPL-CCNC: 45 U/L (ref 30–99)
ALT SERPL-CCNC: 30 U/L (ref 2–50)
ANION GAP SERPL CALC-SCNC: 9 MMOL/L (ref 7–16)
AST SERPL-CCNC: 29 U/L (ref 12–45)
BASOPHILS # BLD AUTO: 1.1 % (ref 0–1.8)
BASOPHILS # BLD: 0.07 K/UL (ref 0–0.12)
BILIRUB SERPL-MCNC: 0.6 MG/DL (ref 0.1–1.5)
BUN SERPL-MCNC: 19 MG/DL (ref 8–22)
CALCIUM ALBUM COR SERPL-MCNC: 9.3 MG/DL (ref 8.5–10.5)
CALCIUM SERPL-MCNC: 9.6 MG/DL (ref 8.5–10.5)
CHLORIDE SERPL-SCNC: 105 MMOL/L (ref 96–112)
CHOLEST SERPL-MCNC: 115 MG/DL (ref 100–199)
CO2 SERPL-SCNC: 25 MMOL/L (ref 20–33)
CREAT SERPL-MCNC: 1.03 MG/DL (ref 0.5–1.4)
CREAT UR-MCNC: 183 MG/DL
EOSINOPHIL # BLD AUTO: 0.15 K/UL (ref 0–0.51)
EOSINOPHIL NFR BLD: 2.4 % (ref 0–6.9)
ERYTHROCYTE [DISTWIDTH] IN BLOOD BY AUTOMATED COUNT: 39.5 FL (ref 35.9–50)
EST. AVERAGE GLUCOSE BLD GHB EST-MCNC: 114 MG/DL
FASTING STATUS PATIENT QL REPORTED: NORMAL
GFR SERPLBLD CREATININE-BSD FMLA CKD-EPI: 79 ML/MIN/1.73 M 2
GLOBULIN SER CALC-MCNC: 2.6 G/DL (ref 1.9–3.5)
GLUCOSE SERPL-MCNC: 102 MG/DL (ref 65–99)
HBA1C MFR BLD: 5.6 % (ref 4–5.6)
HCT VFR BLD AUTO: 50.6 % (ref 42–52)
HDLC SERPL-MCNC: 39 MG/DL
HGB BLD-MCNC: 16.5 G/DL (ref 14–18)
IMM GRANULOCYTES # BLD AUTO: 0.02 K/UL (ref 0–0.11)
IMM GRANULOCYTES NFR BLD AUTO: 0.3 % (ref 0–0.9)
LDLC SERPL CALC-MCNC: 61 MG/DL
LYMPHOCYTES # BLD AUTO: 2.54 K/UL (ref 1–4.8)
LYMPHOCYTES NFR BLD: 40.8 % (ref 22–41)
MCH RBC QN AUTO: 29.3 PG (ref 27–33)
MCHC RBC AUTO-ENTMCNC: 32.6 G/DL (ref 32.3–36.5)
MCV RBC AUTO: 89.7 FL (ref 81.4–97.8)
MICROALBUMIN UR-MCNC: <1.2 MG/DL
MICROALBUMIN/CREAT UR: NORMAL MG/G (ref 0–30)
MONOCYTES # BLD AUTO: 0.72 K/UL (ref 0–0.85)
MONOCYTES NFR BLD AUTO: 11.6 % (ref 0–13.4)
NEUTROPHILS # BLD AUTO: 2.73 K/UL (ref 1.82–7.42)
NEUTROPHILS NFR BLD: 43.8 % (ref 44–72)
NRBC # BLD AUTO: 0 K/UL
NRBC BLD-RTO: 0 /100 WBC (ref 0–0.2)
PLATELET # BLD AUTO: 283 K/UL (ref 164–446)
PMV BLD AUTO: 11.4 FL (ref 9–12.9)
POTASSIUM SERPL-SCNC: 4.7 MMOL/L (ref 3.6–5.5)
PROT SERPL-MCNC: 7 G/DL (ref 6–8.2)
RBC # BLD AUTO: 5.64 M/UL (ref 4.7–6.1)
SODIUM SERPL-SCNC: 139 MMOL/L (ref 135–145)
TRIGL SERPL-MCNC: 74 MG/DL (ref 0–149)
WBC # BLD AUTO: 6.2 K/UL (ref 4.8–10.8)

## 2025-07-22 PROCEDURE — 80053 COMPREHEN METABOLIC PANEL: CPT

## 2025-07-22 PROCEDURE — 83036 HEMOGLOBIN GLYCOSYLATED A1C: CPT | Mod: GA

## 2025-07-22 PROCEDURE — 82570 ASSAY OF URINE CREATININE: CPT

## 2025-07-22 PROCEDURE — 36415 COLL VENOUS BLD VENIPUNCTURE: CPT

## 2025-07-22 PROCEDURE — 80061 LIPID PANEL: CPT

## 2025-07-22 PROCEDURE — 85025 COMPLETE CBC W/AUTO DIFF WBC: CPT

## 2025-07-22 PROCEDURE — 82043 UR ALBUMIN QUANTITATIVE: CPT

## 2025-07-31 SDOH — ECONOMIC STABILITY: INCOME INSECURITY: HOW HARD IS IT FOR YOU TO PAY FOR THE VERY BASICS LIKE FOOD, HOUSING, MEDICAL CARE, AND HEATING?: NOT HARD AT ALL

## 2025-07-31 SDOH — ECONOMIC STABILITY: INCOME INSECURITY: IN THE LAST 12 MONTHS, WAS THERE A TIME WHEN YOU WERE NOT ABLE TO PAY THE MORTGAGE OR RENT ON TIME?: NO

## 2025-07-31 SDOH — HEALTH STABILITY: PHYSICAL HEALTH: ON AVERAGE, HOW MANY DAYS PER WEEK DO YOU ENGAGE IN MODERATE TO STRENUOUS EXERCISE (LIKE A BRISK WALK)?: 5 DAYS

## 2025-07-31 SDOH — ECONOMIC STABILITY: FOOD INSECURITY: WITHIN THE PAST 12 MONTHS, THE FOOD YOU BOUGHT JUST DIDN'T LAST AND YOU DIDN'T HAVE MONEY TO GET MORE.: NEVER TRUE

## 2025-07-31 SDOH — ECONOMIC STABILITY: FOOD INSECURITY: WITHIN THE PAST 12 MONTHS, YOU WORRIED THAT YOUR FOOD WOULD RUN OUT BEFORE YOU GOT MONEY TO BUY MORE.: NEVER TRUE

## 2025-07-31 SDOH — HEALTH STABILITY: PHYSICAL HEALTH: ON AVERAGE, HOW MANY MINUTES DO YOU ENGAGE IN EXERCISE AT THIS LEVEL?: 60 MIN

## 2025-07-31 SDOH — HEALTH STABILITY: MENTAL HEALTH
STRESS IS WHEN SOMEONE FEELS TENSE, NERVOUS, ANXIOUS, OR CAN'T SLEEP AT NIGHT BECAUSE THEIR MIND IS TROUBLED. HOW STRESSED ARE YOU?: ONLY A LITTLE

## 2025-07-31 ASSESSMENT — SOCIAL DETERMINANTS OF HEALTH (SDOH)
WITHIN THE PAST 12 MONTHS, YOU WORRIED THAT YOUR FOOD WOULD RUN OUT BEFORE YOU GOT THE MONEY TO BUY MORE: NEVER TRUE
HOW OFTEN DO YOU ATTENT MEETINGS OF THE CLUB OR ORGANIZATION YOU BELONG TO?: MORE THAN 4 TIMES PER YEAR
IN A TYPICAL WEEK, HOW MANY TIMES DO YOU TALK ON THE PHONE WITH FAMILY, FRIENDS, OR NEIGHBORS?: MORE THAN THREE TIMES A WEEK
HOW MANY DRINKS CONTAINING ALCOHOL DO YOU HAVE ON A TYPICAL DAY WHEN YOU ARE DRINKING: PATIENT DOES NOT DRINK
DO YOU BELONG TO ANY CLUBS OR ORGANIZATIONS SUCH AS CHURCH GROUPS UNIONS, FRATERNAL OR ATHLETIC GROUPS, OR SCHOOL GROUPS?: YES
HOW OFTEN DO YOU ATTENT MEETINGS OF THE CLUB OR ORGANIZATION YOU BELONG TO?: MORE THAN 4 TIMES PER YEAR
HOW OFTEN DO YOU HAVE SIX OR MORE DRINKS ON ONE OCCASION: NEVER
IN THE PAST 12 MONTHS, HAS THE ELECTRIC, GAS, OIL, OR WATER COMPANY THREATENED TO SHUT OFF SERVICE IN YOUR HOME?: NO
HOW OFTEN DO YOU GET TOGETHER WITH FRIENDS OR RELATIVES?: MORE THAN THREE TIMES A WEEK
DO YOU BELONG TO ANY CLUBS OR ORGANIZATIONS SUCH AS CHURCH GROUPS UNIONS, FRATERNAL OR ATHLETIC GROUPS, OR SCHOOL GROUPS?: YES
HOW OFTEN DO YOU ATTEND CHURCH OR RELIGIOUS SERVICES?: PATIENT DECLINED
HOW OFTEN DO YOU GET TOGETHER WITH FRIENDS OR RELATIVES?: MORE THAN THREE TIMES A WEEK
IN A TYPICAL WEEK, HOW MANY TIMES DO YOU TALK ON THE PHONE WITH FAMILY, FRIENDS, OR NEIGHBORS?: MORE THAN THREE TIMES A WEEK
HOW OFTEN DO YOU HAVE A DRINK CONTAINING ALCOHOL: NEVER
HOW OFTEN DO YOU ATTEND CHURCH OR RELIGIOUS SERVICES?: PATIENT DECLINED
HOW HARD IS IT FOR YOU TO PAY FOR THE VERY BASICS LIKE FOOD, HOUSING, MEDICAL CARE, AND HEATING?: NOT HARD AT ALL

## 2025-07-31 ASSESSMENT — LIFESTYLE VARIABLES
HOW OFTEN DO YOU HAVE A DRINK CONTAINING ALCOHOL: NEVER
SKIP TO QUESTIONS 9-10: 1
HOW OFTEN DO YOU HAVE SIX OR MORE DRINKS ON ONE OCCASION: NEVER
AUDIT-C TOTAL SCORE: 0
HOW MANY STANDARD DRINKS CONTAINING ALCOHOL DO YOU HAVE ON A TYPICAL DAY: PATIENT DOES NOT DRINK

## 2025-08-01 ENCOUNTER — APPOINTMENT (OUTPATIENT)
Dept: MEDICAL GROUP | Facility: LAB | Age: 70
End: 2025-08-01
Payer: MEDICARE

## 2025-08-01 PROBLEM — Z99.11 ENCOUNTER FOR WEANING FROM VENTILATOR (HCC): Status: RESOLVED | Noted: 2024-12-20 | Resolved: 2025-08-01

## 2025-08-01 PROBLEM — R80.9 MICROALBUMINURIA: Status: RESOLVED | Noted: 2024-10-02 | Resolved: 2025-08-01

## 2025-08-01 PROBLEM — D62 ACUTE BLOOD LOSS ANEMIA: Status: RESOLVED | Noted: 2024-12-20 | Resolved: 2025-08-01

## 2025-08-01 PROBLEM — R42 DIZZINESS: Status: RESOLVED | Noted: 2020-10-14 | Resolved: 2025-08-01

## 2025-08-01 PROBLEM — Q23.81 BICUSPID AORTIC VALVE: Status: RESOLVED | Noted: 2020-10-14 | Resolved: 2025-08-01

## 2025-08-01 PROBLEM — G93.40 ACUTE ENCEPHALOPATHY: Status: RESOLVED | Noted: 2024-12-22 | Resolved: 2025-08-01

## 2025-08-01 PROBLEM — D69.6 THROMBOCYTOPENIA (HCC): Status: RESOLVED | Noted: 2024-12-23 | Resolved: 2025-08-01

## 2025-08-01 PROBLEM — N17.9 AKI (ACUTE KIDNEY INJURY) (HCC): Status: RESOLVED | Noted: 2024-12-22 | Resolved: 2025-08-01

## 2025-08-01 PROBLEM — R73.03 PREDIABETES: Status: RESOLVED | Noted: 2022-04-25 | Resolved: 2025-08-01

## 2025-08-01 ASSESSMENT — FIBROSIS 4 INDEX: FIB4 SCORE: 1.27

## 2025-08-01 ASSESSMENT — ENCOUNTER SYMPTOMS
FEVER: 0
CHILLS: 0
SHORTNESS OF BREATH: 0

## 2025-08-01 ASSESSMENT — PATIENT HEALTH QUESTIONNAIRE - PHQ9: CLINICAL INTERPRETATION OF PHQ2 SCORE: 0

## 2025-08-14 ENCOUNTER — HOSPITAL ENCOUNTER (OUTPATIENT)
Dept: RADIOLOGY | Facility: MEDICAL CENTER | Age: 70
End: 2025-08-14
Attending: FAMILY MEDICINE
Payer: MEDICARE

## 2025-08-14 DIAGNOSIS — I71.21 ANEURYSM OF ASCENDING AORTA WITHOUT RUPTURE (HCC): ICD-10-CM

## 2025-08-14 PROCEDURE — 93978 VASCULAR STUDY: CPT

## 2025-08-14 PROCEDURE — 93978 VASCULAR STUDY: CPT | Mod: 26 | Performed by: INTERNAL MEDICINE

## 2025-10-27 ENCOUNTER — APPOINTMENT (OUTPATIENT)
Dept: MEDICAL GROUP | Facility: IMAGING CENTER | Age: 70
End: 2025-10-27
Payer: MEDICARE

## (undated) DEVICE — DERMABOND ADVANCED - (12EA/BX)

## (undated) DEVICE — SUTURE 2-0 ETHIBOND V-5 - (6/BX)

## (undated) DEVICE — ARMBOARD SMALL IV 9 INLONG - (25EA/CA)

## (undated) DEVICE — SUTURE 5-0 PROLENE C-1 D/A 24 (36PK/BX)"

## (undated) DEVICE — LACTATED RINGERS INJ 1000 ML - (14EA/CA 60CA/PF)

## (undated) DEVICE — GAUZE FLUFF STERILE 2-PLY 36 X 36 (100EA/CA)

## (undated) DEVICE — CHLORAPREP 26 ML APPLICATOR - ORANGE TINT(25/CA)

## (undated) DEVICE — TOWELS CLOTH SURGICAL - (4/PK 20PK/CA)

## (undated) DEVICE — BAG RESUSCITATION DISPOSABLE - WITH MASK (10 EA/CA)

## (undated) DEVICE — Device

## (undated) DEVICE — BANDAGE ELASTIC 4 IN X 5 YDS - LATEX FREE(10/BX 5BX/CA)

## (undated) DEVICE — BLADE STERNUM SAW SURGICAL 32.0 X 6.4 MM STERILE (1/EA)

## (undated) DEVICE — SET LEADWIRE 5 LEAD BEDSIDE DISPOSABLE ECG (1SET OF 5/EA)

## (undated) DEVICE — GLOVE BIOGEL SZ 7.5 SURGICAL PF LTX - (50PR/BX 4BX/CA)

## (undated) DEVICE — TUBE CHEST 32FR. STRAIGHT - (10EA/CA)

## (undated) DEVICE — ORGANIZER SUTURE GABBAY-FRAT - ER STERILE (3/SET 4ST/BX)

## (undated) DEVICE — KIT CATHETERIZATION TWO-LUMEN CENTRAL VENOUS PI CVC (5EA/CA)

## (undated) DEVICE — SUTURE 5-0 PROLENE RB-1 D/A 36 (36PK/BX)"

## (undated) DEVICE — GLOVE BIOGEL INDICATOR SZ 8 SURGICAL PF LTX - (50/BX 4BX/CA)

## (undated) DEVICE — SOD. CHL. INJ. 0.9% 1000 ML - (14EA/CA 60CA/PF)

## (undated) DEVICE — GOWN WARMING STANDARD FLEX - (30/CA)

## (undated) DEVICE — ELECTRODE DUAL RETURN W/ CORD - (50/PK)

## (undated) DEVICE — BLADE SURGICAL #15 - (50/BX 3BX/CA)

## (undated) DEVICE — PACK CV DRAPING/BASIN 2PART - (1/CA)

## (undated) DEVICE — ELECTRODE RADIOLUCNT SOLID GEL DEFIB PADS (12EA/CA)

## (undated) DEVICE — TUBING CLEARLINK DUO-VENT - C-FLO (48EA/CA)

## (undated) DEVICE — SYSTEM CHEST DRAIN ADULT/PEDS W/AUTO TRANSFUSION CAPABILITY SAHARA (6EA/CA)

## (undated) DEVICE — SENSOR OXIMETER EQUANOX ADVANCE LARGE DISPOSABLE 8004CA 7600 SYSTEM (MUST ORDER IN QTYS OF 20)

## (undated) DEVICE — SENSOR OXIMETER ADULT SPO2 RD SET (20EA/BX)

## (undated) DEVICE — TUBE CHEST 32FR. RIGHT ANGLED (10EA/CA)

## (undated) DEVICE — TRANSDUCER BIFURCATED MONITORING KIT (10EA/CA)

## (undated) DEVICE — CANISTER SUCTION 3000ML MECHANICAL FILTER AUTO SHUTOFF MEDI-VAC NONSTERILE LF DISP (40EA/CA)

## (undated) DEVICE — SET EXTENSION WITH 2 PORTS (48EA/CA) ***PART #2C8610 IS A SUBSTITUTE*****

## (undated) DEVICE — SYS DLV COST CLS RM TEMP - INJECTATE (CO-SET II) (10EA/CA)

## (undated) DEVICE — KIT INTROPERCUTANEOUS SHEATH - 8.5 FR W/MAX BARRIER AND BIOPATCH (5/CA)

## (undated) DEVICE — LEAD PACING TEMP MYO - (12/BX)

## (undated) DEVICE — FIBRILLAR SURGICEL 4X4 - 10/CA

## (undated) DEVICE — SUTURE 2-0 ETHIBOND V-5 30 (12EA/BX)"

## (undated) DEVICE — TRAY MULTI-LUMEN 7FR PRESSURE W/MAX BARRIER AND BIOPATCH - (5/CA)

## (undated) DEVICE — SUTURE 6-0 PROLENE RB-2 D/A 30 (36PK/BX)"

## (undated) DEVICE — WIRE STEEL 5-0 B&S 20 OHS - 5/PK 12PK/BX ITEM. D5329 OR D6625 CAN BE USED AS A SUB

## (undated) DEVICE — SET BIFURCATED BLOOD - (48EA/CS)

## (undated) DEVICE — PAD LAP STERILE 18 X 18 - (5/PK 40PK/CA)

## (undated) DEVICE — MICRODRIP PRIMARY VENTED 60 (48EA/CA) THIS WAS PART #2C8428 WHICH WAS DISCONTINUED

## (undated) DEVICE — SLEEVE, VASO, THIGH, MED

## (undated) DEVICE — COVER LIGHT HANDLE ALC PLUS DISP (18EA/BX)

## (undated) DEVICE — SUTURE OHS

## (undated) DEVICE — KIT RADIAL ARTERY 20GA W/MAX BARRIER AND BIOPATCH (5EA/CA) #10740 IS FOR THE SET RADIAL ARTERIAL

## (undated) DEVICE — SET FLUID WARMING STANDARD FLOW - (10/CA)

## (undated) DEVICE — INSERT STEALTH #3 - (10/BX)

## (undated) DEVICE — CATHETER THERMALDILUTION SWAN - (5EA/CA)

## (undated) DEVICE — QUICKLOADS COR-KNOT TITANIUM - (6EA/PK 12PK/BX)

## (undated) DEVICE — SUCTION INSTRUMENT YANKAUER BULBOUS TIP W/O VENT (50EA/CA)

## (undated) DEVICE — D-5-W INJ. 500 ML - (24EA/CA)

## (undated) DEVICE — SUTURE 4-0 30CM STRATAFIX SPIRAL PS-2 (12EA/BX)

## (undated) DEVICE — SODIUM CHL. INJ. 0.9% 500ML (24EA/CA 50CA/PF)

## (undated) DEVICE — DEVICE KIT COR-KNOT COMBO2 DEVICES & 12 KNOTS PER KIT (6KT/CA)

## (undated) DEVICE — SODIUM CHL IRRIGATION 0.9% 1000ML (12EA/CA)

## (undated) DEVICE — ADHESIVE DERMABOND HVD MINI (12EA/BX)

## (undated) DEVICE — INSERT STEALTH #5 - (10/BX)

## (undated) DEVICE — TRAY SURESTEP FOLEY TEMP SENSING 16FR SNAP SECURE(10EA/CA) ORDER #18764 FOR TEMP FOLEY ONLY

## (undated) DEVICE — STOPCOCK MALE 4-WAY - (50/CA)

## (undated) DEVICE — SET TUBING PNEUMOCLEAR HIGH FLOW SMOKE EVACUATION (10EA/BX)

## (undated) DEVICE — SUTURE 5 SURGICAL STEEL V-40 - (12/BX) CCS CURRENT